# Patient Record
Sex: MALE | Race: WHITE
[De-identification: names, ages, dates, MRNs, and addresses within clinical notes are randomized per-mention and may not be internally consistent; named-entity substitution may affect disease eponyms.]

---

## 2018-01-16 NOTE — EMERGENCY ROOM REPORT
History of Present Illness


General


Chief Complaint:  Dyspnea/Respdistress


Source:  Patient, EMS





Present Illness


HPI


Patient presents with edema and dyspnea.  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 

on oral diuretics.  The edema/swelling is extending up into his abdomen.  His 

oxygen saturation was in the 70s.  This improved with oxygen and also albuterol.





He states he has been taking his meds.





He denies calf pain, chest pain, hemoptysis, fevers, productive cough.





In the past he was seen here for alcohol abuse and a fall.  Denies alcohol at 

this time or other drugs.





No dysuria, fevers, chills, NVD.  No SI or HI.





On oral DM meds (glyburide).


Allergies:  


Coded Allergies:  


     No Known Allergies (Unverified , 1/16/18)





Patient History


Past Medical History:  see triage record, old chart reviewed


Social History:  Reports: smoking, alcohol use - prior - denies now, Denies: 

drug use


Social History Narrative


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


Reviewed Nursing Documentation:  PMH: Agreed, PSxH: Agreed





Nursing Documentation-PMH


Hx Hypertension:  Yes


Hx Asthma:  Yes


Hx Diabetes:  Yes





Review of Systems


All Other Systems:  negative except mentioned in HPI





Physical Exam





Vital Signs








  Date Time  Temp Pulse Resp B/P (MAP) Pulse Ox O2 Delivery O2 Flow Rate FiO2


 


1/16/18 20:11 98.4 90 18 118/73 98   








Sp02 EP Interpretation:  reviewed, normal - though this was on O2


General Appearance:  no apparent distress, alert, obese, Chronically Ill


Eyes:  bilateral eye normal inspection, bilateral eye PERRL


ENT:  moist mucus membranes


Neck:  full range of motion, supple


Respiratory:  decreased breath sounds


Cardiovascular #1:  regular rate, rhythm, edema - 3+ pitting


Cardiovascular #2:  2+ radial (L)


Gastrointestinal:  normal bowel sounds, non tender, hepatomegaly, overweight


Genitourinary:  other - massive scotal edema


Musculoskeletal:  digits/nails normal, no calf tenderness


Neurologic:  alert, motor strength/tone normal, DTRs symmetric, sensory intact, 

other - slurred speech


Psychiatric:  other - slight inappropriate affect


Skin:  other - minimal erythema abdomen and legs





Medical Decision Making


Diagnostic Impression:  


 Primary Impression:  


 CHF (congestive heart failure)


 Qualified Codes:  I50.43 - Acute on chronic combined systolic (congestive) and 

diastolic (congestive) heart failure


 Additional Impressions:  


 Cor pulmonale


 Hypoxia


 Anasarca


 Hepatic congestion


 Elevated ammonia


ER Course


The patient presents with significant edema extending from his abdomen down to 

his legs.  Also is hypoxemic.  Differential includes exacerbation of congestive 

heart failure, acute myocardial infarction, noncompliance, pulmonary embolus 

amongst others.  Evaluation will be with EKG and chest x-ray and labs.  

Treatment with nitro paste and Lasix. 





EKG without injury.  CXR = chf and inc cor.  Labs with normal WBC, troponin.  

Elevated LFTs with elevated ammonia.  INR is prolonged slightly (this would be 

against PE as no calf tenderness).





Patient diuresed but still with relative hypoxia and anasarca.  Needs admission 

for continued diuresis and evaluation.  





Discussed with Dr. Oneill who requests patient remain here.





Admit telemetry, Dr. Plummer.





Laboratory Tests








Test


  1/16/18


22:10 1/17/18


02:20


 


White Blood Count


  8.5 K/UL


(4.8-10.8) 


 


 


Red Blood Count


  5.62 M/UL


(4.70-6.10) 


 


 


Hemoglobin


  14.6 G/DL


(14.2-18.0) 


 


 


Hematocrit


  51.3 %


(42.0-52.0) 


 


 


Mean Corpuscular Volume 91 FL (80-99)   


 


Mean Corpuscular Hemoglobin


  26.0 PG


(27.0-31.0)  L 


 


 


Mean Corpuscular Hemoglobin


Concent 28.5 G/DL


(32.0-36.0)  L 


 


 


Red Cell Distribution Width


  15.5 %


(11.6-14.8)  H 


 


 


Platelet Count


  171 K/UL


(150-450) 


 


 


Mean Platelet Volume


  6.8 FL


(6.5-10.1) 


 


 


Neutrophils (%) (Auto)


  72.7 %


(45.0-75.0) 


 


 


Lymphocytes (%) (Auto)


  9.7 %


(20.0-45.0)  L 


 


 


Monocytes (%) (Auto)


  14.4 %


(1.0-10.0)  H 


 


 


Eosinophils (%) (Auto)


  0.1 %


(0.0-3.0) 


 


 


Basophils (%) (Auto)


  3.1 %


(0.0-2.0)  H 


 


 


Prothrombin Time


  14.1 SEC


(9.30-11.50)  H 


 


 


Prothrombin Time INR


  1.3 (0.9-1.1)


H 


 


 


PTT


  29 SEC (23-33)


  


 


 


Urine Color Elisabet   


 


Urine Appearance Clear   


 


Urine pH 5 (4.5-8.0)   


 


Urine Specific Gravity


  1.015


(1.005-1.035) 


 


 


Urine Protein


  1+ (NEGATIVE)


H 


 


 


Urine Glucose (UA)


  Negative


(NEGATIVE) 


 


 


Urine Ketones


  Negative


(NEGATIVE) 


 


 


Urine Occult Blood


  Negative


(NEGATIVE) 


 


 


Urine Nitrite


  Negative


(NEGATIVE) 


 


 


Urine Bilirubin


  Negative


(NEGATIVE) 


 


 


Urine Ictotest Negative   


 


Urine Urobilinogen


  4 MG/DL


(0.0-1.0)  H 


 


 


Urine Leukocyte Esterase


  1+ (NEGATIVE)


H 


 


 


Urine RBC


  0-2 /HPF (0 -


0)  H 


 


 


Urine WBC


  0-2 /HPF (0 -


0) 


 


 


Urine Squamous Epithelial


Cells Occasional


/LPF 


 


 


Urine Bacteria


  Few /HPF


(NONE) 


 


 


Sodium Level


  131 MMOL/L


(136-145)  L 


 


 


Potassium Level


  4.7 MMOL/L


(3.5-5.1) 


 


 


Chloride Level


  91 MMOL/L


()  L 


 


 


Carbon Dioxide Level


  42 MMOL/L


(21-32)  *H 


 


 


Blood Urea Nitrogen


  38 mg/dL


(7-18)  H 


 


 


Creatinine


  1.2 MG/DL


(0.55-1.30) 


 


 


Estimate Glomerular


Filtration Rate > 60 mL/min


(>60) 


 


 


Glucose Level


  70 MG/DL


()  L 


 


 


Calcium Level


  8.7 MG/DL


(8.5-10.1) 


 


 


Total Bilirubin


  1.2 MG/DL


(0.2-1.0)  H 


 


 


Direct Bilirubin


  0.7 MG/DL


(0.0-0.3)  H 


 


 


Aspartate Amino Transferase


(AST) 131 U/L


(15-37)  H 


 


 


Alanine Aminotransferase (ALT)


  183 U/L


(12-78)  H 


 


 


Alkaline Phosphatase


  91 U/L


() 


 


 


Ammonia


  71 umol/L


(11-32)  H 


 


 


Total Creatine Kinase


  291 U/L


() 


 


 


Troponin I


  0.026 ng/mL


(0.000-0.056) 0.014 ng/mL


(0.000-0.056)


 


Pro-B-Type Natriuretic Peptide


  3712 pg/mL


(0-125)  H 


 


 


Total Protein


  5.9 G/DL


(6.4-8.2)  L 


 


 


Albumin


  2.8 G/DL


(3.4-5.0)  L 


 


 


Globulin 3.1 g/dL   


 


Albumin/Globulin Ratio


  0.9 (1.0-2.7)


L 


 


 


Urine Opiates Screen


  Negative


(NEGATIVE) 


 


 


Urine Barbiturates Screen


  Negative


(NEGATIVE) 


 


 


Phencyclidine (PCP) Screen


  Negative


(NEGATIVE) 


 


 


Urine Amphetamines Screen


  Negative


(NEGATIVE) 


 


 


Urine Benzodiazepines Screen


  Negative


(NEGATIVE) 


 


 


Urine Cocaine Screen


  Negative


(NEGATIVE) 


 


 


Urine Marijuana (THC) Screen


  Negative


(NEGATIVE) 


 








EKG Diagnostic Results


Rate:  normal


Rhythm:  NSR


ST Segments:  no acute changes





Rhythm Strip Diag. Results


EP Interpretation:  yes


Rhythm:  NSR, no PVC's, no ectopy





Chest X-Ray Diagnostic Results


Chest X-Ray Diagnostic Results :  


   Chest X-Ray Ordered:  Yes


   # of Views/Limited/Complete:  1 View


   Indication:  Shortness of Breath


   EP Interpretation:  Yes


   Interpretation:  no effusion, no pneumothorax, other - cardiomegally


   Impression:  Other


   Electronically Signed by:  Franklin Puentes MD





Last Vital Signs








  Date Time  Temp Pulse Resp B/P (MAP) Pulse Ox O2 Delivery O2 Flow Rate FiO2


 


1/17/18 04:00 97.7 88 20 102/73 93   


 


1/17/18 02:35      Non-Rebreather 15.0 21








Status:  improved


Disposition:  ADMITTED AS INPATIENT


Condition:  Serious











Franklin Puentes M.D. Jan 16, 2018 21:03

## 2018-01-17 NOTE — CARDIOLOGY REPORT
--------------- APPROVED REPORT --------------





EKG Measurement

Heart [id number]

WA 176P

[id number]

[id number]

[id number]





Normal sinus rhythm

Rightward axis

Septal infarct, age undetermined

Abnormal ECG

## 2018-01-17 NOTE — CARDIOLOGY REPORT
--------------- APPROVED REPORT --------------





EXAM: Two-dimensional and M-mode echocardiogram with Doppler and color 

Doppler.



INDICATION

LV FUNCTION 



M-Mode DIMENSIONS 

IVSd1.1 (0.7-1.1cm)Left Atrium (MM)3.6 (1.6-4.0cm)

LVDd4.2 (3.5-5.6cm)Aortic Root3.9 (2.0-3.7cm)

PWd1.7 (0.7-1.1cm)Aortic Cusp Exc.2.1 (1.5-2.0cm)

IVSs2.2 cm

LVDs2.7 (2.5-4.0cm)

PWs2.7 cm





Technically difficult study due to poor acoustical windows and patient breathing.

Normal left ventricular chamber size.

Mild LV hypokineiss with abnormal septal motion.

Left ventricular ejection fraction estimated to be 50%.

Small pericardial effusion

Severe right atrial and right ventricular enlargment, consistant with volume pressure 

overloaded .

Left atrial chamber size are within normal limits .

Focal aortic valve sclerosis with adequate cusp excursion.

Thickened mitral valve leaflets with normal excursion.

Mitral annulus and aortic root calcification.

Normal pulmonic valve structure.

Normal tricuspid valve structure. 

IVC dilated at 4.0 cm without physiologic collapse suggestive of increased RA 

pressure.

Abnormal septal motion due to right ventricle volume overload.



A  color flow and spectral Doppler study was performed and revealed:

No  aortic regurgitation.

Mild mitral regurgitation.

Mitral diastolic velocities suggest reduced left ventricular relaxation c/w mild LV 

diastolic dysfunction (Grade I ).

Mild tricuspid regurgitation.

Tricuspid  systolic velocities suggests peak right ventricular systolic pressure of 75  

mmHg,consistent with severe pulmonary hypertension.

No Pulmonic regurgitation present.

## 2018-01-17 NOTE — CARDIOLOGY PROGRESS NOTE
Assessment/Plan


Assessment/Plan


core pulmonale 


passive leiver congestion 


bronchospasm 








hhn 


durietic as bp allso 


revatio trial?





Objective





Last 24 Hour Vital Signs








  Date Time  Temp Pulse Resp B/P (MAP) Pulse Ox O2 Delivery O2 Flow Rate FiO2


 


1/17/18 16:00 97.5 82 20 116/76 94   


 


1/17/18 11:36     92   


 


1/17/18 11:31 97.9 93 19 116/89    


 


1/17/18 08:35 98.1 99 19 122/75 95   


 


1/17/18 04:00 97.7 88 20 102/73 93   


 


1/17/18 02:35 98.4 81 18 119/89 99 Non-Rebreather 15.0 21


 


1/17/18 02:00  81 18 119/89 99 Non-Rebreather 15.0 


 


1/17/18 00:00  95 21 117/66 100 Non-Rebreather 15.0 


 


1/16/18 22:46    118/72    


 


1/16/18 22:00  84 16 109/81 100 Non-Rebreather 15.0 


 


1/16/18 21:40  87 18  97 Room Air  21


 


1/16/18 21:30  98 18  97 Room Air  21


 


1/16/18 21:30        36


 


1/16/18 21:30  102 20   Room Air  21


 


1/16/18 20:20 98.4  18 118/73 98   


 


1/16/18 20:20  90 18   Non-Rebreather 15.0 


 


1/16/18 20:11 98.4 90 18 118/73 98   

















Intake and Output  


 


 1/16/18 1/17/18





 19:00 07:00


 


Output Total  800 ml


 


Balance  -800 ml


 


  


 


Output Urine Total  800 ml


 


# Voids  4











Laboratory Tests








Test


  1/16/18


22:10 1/17/18


02:20 1/17/18


12:45


 


White Blood Count


  8.5 K/UL


(4.8-10.8) 


  


 


 


Red Blood Count


  5.62 M/UL


(4.70-6.10) 


  


 


 


Hemoglobin


  14.6 G/DL


(14.2-18.0) 


  


 


 


Hematocrit


  51.3 %


(42.0-52.0) 


  


 


 


Mean Corpuscular Volume 91 FL (80-99)    


 


Mean Corpuscular Hemoglobin


  26.0 PG


(27.0-31.0)  L 


  


 


 


Mean Corpuscular Hemoglobin


Concent 28.5 G/DL


(32.0-36.0)  L 


  


 


 


Red Cell Distribution Width


  15.5 %


(11.6-14.8)  H 


  


 


 


Platelet Count


  171 K/UL


(150-450) 


  


 


 


Mean Platelet Volume


  6.8 FL


(6.5-10.1) 


  


 


 


Neutrophils (%) (Auto)


  72.7 %


(45.0-75.0) 


  


 


 


Lymphocytes (%) (Auto)


  9.7 %


(20.0-45.0)  L 


  


 


 


Monocytes (%) (Auto)


  14.4 %


(1.0-10.0)  H 


  


 


 


Eosinophils (%) (Auto)


  0.1 %


(0.0-3.0) 


  


 


 


Basophils (%) (Auto)


  3.1 %


(0.0-2.0)  H 


  


 


 


Prothrombin Time


  14.1 SEC


(9.30-11.50)  H 


  


 


 


Prothromb Time International


Ratio 1.3 (0.9-1.1)


H 


  


 


 


Activated Partial


Thromboplast Time 29 SEC (23-33)


  


  


 


 


Urine Color Elisabet    


 


Urine Appearance Clear    


 


Urine pH 5 (4.5-8.0)    


 


Urine Specific Gravity


  1.015


(1.005-1.035) 


  


 


 


Urine Protein


  1+ (NEGATIVE)


H 


  


 


 


Urine Glucose (UA)


  Negative


(NEGATIVE) 


  


 


 


Urine Ketones


  Negative


(NEGATIVE) 


  


 


 


Urine Occult Blood


  Negative


(NEGATIVE) 


  


 


 


Urine Nitrite


  Negative


(NEGATIVE) 


  


 


 


Urine Bilirubin


  Negative


(NEGATIVE) 


  


 


 


Urine Ictotest Negative    


 


Urine Urobilinogen


  4 MG/DL


(0.0-1.0)  H 


  


 


 


Urine Leukocyte Esterase


  1+ (NEGATIVE)


H 


  


 


 


Urine RBC


  0-2 /HPF (0 -


0)  H 


  


 


 


Urine WBC


  0-2 /HPF (0 -


0) 


  


 


 


Urine Squamous Epithelial


Cells Occasional


/LPF 


  


 


 


Urine Bacteria


  Few /HPF


(NONE) 


  


 


 


Sodium Level


  131 MMOL/L


(136-145)  L 


  


 


 


Potassium Level


  4.7 MMOL/L


(3.5-5.1) 


  


 


 


Chloride Level


  91 MMOL/L


()  L 


  


 


 


Carbon Dioxide Level


  42 MMOL/L


(21-32)  *H 


  


 


 


Blood Urea Nitrogen


  38 mg/dL


(7-18)  H 


  


 


 


Creatinine


  1.2 MG/DL


(0.55-1.30) 


  


 


 


Estimat Glomerular Filtration


Rate > 60 mL/min


(>60) 


  


 


 


Glucose Level


  70 MG/DL


()  L 


  


 


 


Calcium Level


  8.7 MG/DL


(8.5-10.1) 


  


 


 


Total Bilirubin


  1.2 MG/DL


(0.2-1.0)  H 


  


 


 


Direct Bilirubin


  0.7 MG/DL


(0.0-0.3)  H 


  


 


 


Aspartate Amino Transf


(AST/SGOT) 131 U/L


(15-37)  H 


  


 


 


Alanine Aminotransferase


(ALT/SGPT) 183 U/L


(12-78)  H 


  


 


 


Alkaline Phosphatase


  91 U/L


() 


  


 


 


Ammonia


  71 umol/L


(11-32)  H 


  


 


 


Total Creatine Kinase


  291 U/L


() 


  


 


 


Troponin I


  0.026 ng/mL


(0.000-0.056) 0.014 ng/mL


(0.000-0.056) 


 


 


Pro-B-Type Natriuretic Peptide


  3712 pg/mL


(0-125)  H 


  


 


 


Total Protein


  5.9 G/DL


(6.4-8.2)  L 


  


 


 


Albumin


  2.8 G/DL


(3.4-5.0)  L 


  


 


 


Globulin 3.1 g/dL    


 


Albumin/Globulin Ratio


  0.9 (1.0-2.7)


L 


  


 


 


Urine Opiates Screen


  Negative


(NEGATIVE) 


  


 


 


Urine Barbiturates Screen


  Negative


(NEGATIVE) 


  


 


 


Phencyclidine (PCP) Screen


  Negative


(NEGATIVE) 


  


 


 


Urine Amphetamines Screen


  Negative


(NEGATIVE) 


  


 


 


Urine Benzodiazepines Screen


  Negative


(NEGATIVE) 


  


 


 


Urine Cocaine Screen


  Negative


(NEGATIVE) 


  


 


 


Urine Marijuana (THC) Screen


  Negative


(NEGATIVE) 


  


 


 


Arterial Blood pH


  


  


  7.350


(7.350-7.450)


 


Arterial Blood Partial


Pressure CO2 


  


  79.5 mmHg


(35.0-45.0)  *H


 


Arterial Blood Partial


Pressure O2 


  


  52.9 mmHg


(75.0-100.0)  L


 


Arterial Blood HCO3


  


  


  43.0 mmol/L


(22.0-26.0)  H


 


Arterial Blood Oxygen


Saturation 


  


  88.0 %


(92.0-98.0)  L


 


Arterial Blood Base Excess   13.0  


 


Frandy Test   Positive  

















JEMAL GAMBOA Jan 17, 2018 20:05

## 2018-01-17 NOTE — DIAGNOSTIC IMAGING REPORT
Indication: Chest pain

 

Comparison:  None

 

A single view chest radiograph was obtained.

 

Findings:

 

Interstitial opacities and vascular prominence with cardiomegaly demonstrated. Bones

are unremarkable.

 

IMPRESSION:

 

Interstitial edema/CHF suspected

## 2018-01-17 NOTE — DIAGNOSTIC IMAGING REPORT
Indication: Dyspnea

 

Comparison:  1/16/1980

 

A single view chest radiograph was obtained.

 

Findings:

 

Interstitial opacities and prominent vascularity demonstrated with cardiomegaly.

Findings appear stable. The right lung base is obscured. There could be a small

pleural effusion.

 

IMPRESSION:

 

CHF/interstitial edema

## 2018-01-17 NOTE — HISTORY AND PHYSICAL
History of Present Illness


General


Date patient seen:  Jan 17, 2018


Reason for Hospitalization:  Dyspnea/Respdistress





Present Illness


HPI


47 year old male with hx of CHF, morbid obesity, psych disorder presented with 

worsening pedal edema and dyspnea.  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 

oral diuretics.  The edema/swelling is extending up into his abdomen.  His 

oxygen saturation was in the 70s.  This improved with oxygen and also albuterol.





Allergies:  


Coded Allergies:  


     No Known Allergies (Unverified , 1/16/18)





Medication History


Scheduled


Glyburide (Glyburide), 5 MG PO BID, (Reported)


Levalbuterol Tartrate (Levalbuterol Tartrate Hfa), 45 MCG IH QID, (Reported)


Naproxen Sodium (Naproxen Sodium), 375 MG ORAL TWICE A DAY, (Reported)





Scheduled PRN


Tramadol Hcl* (Ultram*), 50 MG ORAL Q6H PRN for For Pain, (Reported)





Miscellaneous Medications


Blood Sugar Diagnostic (Freestyle Lite Test Strips), 1 EACH , (Reported)





Patient History


Healthcare decision maker





Resuscitation status


Full Code


Advanced Directive on File








Past Medical/Surgical History


Past Medical/Surgical History:  


(1) Diabetes mellitus


(2) Hepatic congestion


(3) Cor pulmonale





Review of Systems


Respiratory:  Reports: shortness of breath, [place]


Musculoskeletal:  Reports: muscle stiffness





Physical Exam


General Appearance:  WD/WN


Lines, tubes and drains:  peripheral


HEENT:  normocephalic, atraumatic


Neck:  non-tender, normal alignment


Respiratory/Chest:  chest wall non-tender, lungs clear


Cardiovascular/Chest:  normal peripheral pulses, normal rate


Abdomen:  normal bowel sounds, non tender


Genitourinary/Rectal:  normal genital exam, normal rectal exam


Extremities:  normal range of motion, non-tender


Skin Exam:  normal pigmentation





Last 24 Hour Vital Signs








  Date Time  Temp Pulse Resp B/P (MAP) Pulse Ox O2 Delivery O2 Flow Rate FiO2


 


1/17/18 16:00 97.5 82 20 116/76 94   


 


1/17/18 11:36     92   


 


1/17/18 11:31 97.9 93 19 116/89    


 


1/17/18 08:35 98.1 99 19 122/75 95   


 


1/17/18 04:00 97.7 88 20 102/73 93   


 


1/17/18 02:35 98.4 81 18 119/89 99 Non-Rebreather 15.0 21


 


1/17/18 02:00  81 18 119/89 99 Non-Rebreather 15.0 


 


1/17/18 00:00  95 21 117/66 100 Non-Rebreather 15.0 


 


1/16/18 22:46    118/72    


 


1/16/18 22:00  84 16 109/81 100 Non-Rebreather 15.0 


 


1/16/18 21:40  87 18  97 Room Air  21


 


1/16/18 21:30  98 18  97 Room Air  21


 


1/16/18 21:30        36


 


1/16/18 21:30  102 20   Room Air  21


 


1/16/18 20:20 98.4  18 118/73 98   


 


1/16/18 20:20  90 18   Non-Rebreather 15.0 


 


1/16/18 20:11 98.4 90 18 118/73 98   

















Intake and Output  


 


 1/16/18 1/17/18





 19:00 07:00


 


Output Total  800 ml


 


Balance  -800 ml


 


  


 


Output Urine Total  800 ml


 


# Voids  4











Laboratory Tests








Test


  1/16/18


22:10 1/17/18


02:20 1/17/18


12:45


 


White Blood Count


  8.5 K/UL


(4.8-10.8) 


  


 


 


Red Blood Count


  5.62 M/UL


(4.70-6.10) 


  


 


 


Hemoglobin


  14.6 G/DL


(14.2-18.0) 


  


 


 


Hematocrit


  51.3 %


(42.0-52.0) 


  


 


 


Mean Corpuscular Volume 91 FL (80-99)    


 


Mean Corpuscular Hemoglobin


  26.0 PG


(27.0-31.0)  L 


  


 


 


Mean Corpuscular Hemoglobin


Concent 28.5 G/DL


(32.0-36.0)  L 


  


 


 


Red Cell Distribution Width


  15.5 %


(11.6-14.8)  H 


  


 


 


Platelet Count


  171 K/UL


(150-450) 


  


 


 


Mean Platelet Volume


  6.8 FL


(6.5-10.1) 


  


 


 


Neutrophils (%) (Auto)


  72.7 %


(45.0-75.0) 


  


 


 


Lymphocytes (%) (Auto)


  9.7 %


(20.0-45.0)  L 


  


 


 


Monocytes (%) (Auto)


  14.4 %


(1.0-10.0)  H 


  


 


 


Eosinophils (%) (Auto)


  0.1 %


(0.0-3.0) 


  


 


 


Basophils (%) (Auto)


  3.1 %


(0.0-2.0)  H 


  


 


 


Prothrombin Time


  14.1 SEC


(9.30-11.50)  H 


  


 


 


Prothromb Time International


Ratio 1.3 (0.9-1.1)


H 


  


 


 


Activated Partial


Thromboplast Time 29 SEC (23-33)


  


  


 


 


Urine Color Elisabet    


 


Urine Appearance Clear    


 


Urine pH 5 (4.5-8.0)    


 


Urine Specific Gravity


  1.015


(1.005-1.035) 


  


 


 


Urine Protein


  1+ (NEGATIVE)


H 


  


 


 


Urine Glucose (UA)


  Negative


(NEGATIVE) 


  


 


 


Urine Ketones


  Negative


(NEGATIVE) 


  


 


 


Urine Occult Blood


  Negative


(NEGATIVE) 


  


 


 


Urine Nitrite


  Negative


(NEGATIVE) 


  


 


 


Urine Bilirubin


  Negative


(NEGATIVE) 


  


 


 


Urine Ictotest Negative    


 


Urine Urobilinogen


  4 MG/DL


(0.0-1.0)  H 


  


 


 


Urine Leukocyte Esterase


  1+ (NEGATIVE)


H 


  


 


 


Urine RBC


  0-2 /HPF (0 -


0)  H 


  


 


 


Urine WBC


  0-2 /HPF (0 -


0) 


  


 


 


Urine Squamous Epithelial


Cells Occasional


/LPF 


  


 


 


Urine Bacteria


  Few /HPF


(NONE) 


  


 


 


Sodium Level


  131 MMOL/L


(136-145)  L 


  


 


 


Potassium Level


  4.7 MMOL/L


(3.5-5.1) 


  


 


 


Chloride Level


  91 MMOL/L


()  L 


  


 


 


Carbon Dioxide Level


  42 MMOL/L


(21-32)  *H 


  


 


 


Blood Urea Nitrogen


  38 mg/dL


(7-18)  H 


  


 


 


Creatinine


  1.2 MG/DL


(0.55-1.30) 


  


 


 


Estimat Glomerular Filtration


Rate > 60 mL/min


(>60) 


  


 


 


Glucose Level


  70 MG/DL


()  L 


  


 


 


Calcium Level


  8.7 MG/DL


(8.5-10.1) 


  


 


 


Total Bilirubin


  1.2 MG/DL


(0.2-1.0)  H 


  


 


 


Direct Bilirubin


  0.7 MG/DL


(0.0-0.3)  H 


  


 


 


Aspartate Amino Transf


(AST/SGOT) 131 U/L


(15-37)  H 


  


 


 


Alanine Aminotransferase


(ALT/SGPT) 183 U/L


(12-78)  H 


  


 


 


Alkaline Phosphatase


  91 U/L


() 


  


 


 


Ammonia


  71 umol/L


(11-32)  H 


  


 


 


Total Creatine Kinase


  291 U/L


() 


  


 


 


Troponin I


  0.026 ng/mL


(0.000-0.056) 0.014 ng/mL


(0.000-0.056) 


 


 


Pro-B-Type Natriuretic Peptide


  3712 pg/mL


(0-125)  H 


  


 


 


Total Protein


  5.9 G/DL


(6.4-8.2)  L 


  


 


 


Albumin


  2.8 G/DL


(3.4-5.0)  L 


  


 


 


Globulin 3.1 g/dL    


 


Albumin/Globulin Ratio


  0.9 (1.0-2.7)


L 


  


 


 


Urine Opiates Screen


  Negative


(NEGATIVE) 


  


 


 


Urine Barbiturates Screen


  Negative


(NEGATIVE) 


  


 


 


Phencyclidine (PCP) Screen


  Negative


(NEGATIVE) 


  


 


 


Urine Amphetamines Screen


  Negative


(NEGATIVE) 


  


 


 


Urine Benzodiazepines Screen


  Negative


(NEGATIVE) 


  


 


 


Urine Cocaine Screen


  Negative


(NEGATIVE) 


  


 


 


Urine Marijuana (THC) Screen


  Negative


(NEGATIVE) 


  


 


 


Arterial Blood pH


  


  


  7.350


(7.350-7.450)


 


Arterial Blood Partial


Pressure CO2 


  


  79.5 mmHg


(35.0-45.0)  *H


 


Arterial Blood Partial


Pressure O2 


  


  52.9 mmHg


(75.0-100.0)  L


 


Arterial Blood HCO3


  


  


  43.0 mmol/L


(22.0-26.0)  H


 


Arterial Blood Oxygen


Saturation 


  


  88.0 %


(92.0-98.0)  L


 


Arterial Blood Base Excess   13.0  


 


Frandy Test   Positive  








Height (Feet):  6


Height (Inches):  2.00


Weight (Pounds):  283


Medications





Current Medications








 Medications


  (Trade)  Dose


 Ordered  Sig/German


 Route


 PRN Reason  Start Time


 Stop Time Status Last Admin


Dose Admin


 


 Acetaminophen


  (Tylenol)  650 mg  Q4H  PRN


 ORAL


 Fever  1/17/18 00:15


 2/16/18 00:14   


 


 


 Albuterol/


 Ipratropium


  (Albuterol/


 Ipratropium)  3 ml  EVERY 4 HOURS  PRN


 HHN


 Shortness of Breath  1/17/18 00:15


 1/22/18 00:14   


 


 


 Dextrose


  (Dextrose 50%)    STAT  PRN


 IV


 Hypoglycemia  1/17/18 00:15


 2/16/18 00:14   


 


 


 Furosemide


  (Lasix)  40 mg  EVERY 8  HOURS


 IV


   1/17/18 06:00


 2/16/18 05:59  1/17/18 14:54


 


 


 Heparin Sodium


  (Porcine)


  (Heparin 5000


 units/ml)  5,000 units  EVERY 12  HOURS


 SUBQ


   1/17/18 09:00


 2/16/18 08:59  1/17/18 08:19


 


 


 Insulin Aspart


  (NovoLOG)    BEFORE MEALS AND  HS


 SUBQ


   1/17/18 06:30


 2/16/18 06:29   


 


 


 Ondansetron HCl


  (Zofran)  4 mg  Q6H  PRN


 IVP


 Nausea & Vomiting  1/17/18 00:15


 2/16/18 00:14   


 


 


 Polyethylene


 Glycol


  (Miralax)  17 gm  DAILYPRN  PRN


 ORAL


 Constipation  1/17/18 00:15


 2/16/18 00:14   


 


 


 Quetiapine


 Fumarate


  (SEROquel)  25 mg  Q4H  PRN


 ORAL


 agitation  1/17/18 12:45


 2/16/18 12:44   


 


 


 Temazepam


  (Restoril)  15 mg  HSPRN  PRN


 ORAL


 Insomnia  1/17/18 00:15


 1/24/18 00:14   


 











Assessment/Plan


Problem List:  


(1) Pulmonary edema


ICD Codes:  J81.1 - Chronic pulmonary edema


SNOMED:  66779315


(2) Anasarca


ICD Codes:  R60.1 - Generalized edema


SNOMED:  883928638, 809348620


(3) CHF (congestive heart failure)


ICD Codes:  I50.9 - Heart failure, unspecified


SNOMED:  64459701, 330111662


Qualifiers:  


   Qualified Codes:  I50.43 - Acute on chronic combined systolic (congestive) 

and diastolic (congestive) heart failure


(4) Hypoxia


ICD Codes:  R09.02 - Hypoxemia


SNOMED:  645781677, 841098616


(5) Cor pulmonale


ICD Codes:  I27.81 - Cor pulmonale (chronic)


SNOMED:  91221102, 282268995


(6) Diabetes mellitus


ICD Codes:  E11.9 - Type 2 diabetes mellitus without complications


SNOMED:  94629849


Assessment/Plan


diuretics


check echo


cxr in a few days


cardio evaluation


check electrolytes











BALJIT JORDAN Jan 17, 2018 17:06

## 2018-01-18 NOTE — PULMONOLOGY PROGRESS NOTE
Assessment/Plan


Problems:  


(1) Pulmonary edema


(2) Anasarca


(3) CHF (congestive heart failure)


(4) Hypoxia


(5) Cor pulmonale


(6) Diabetes mellitus


Assessment/Plan


on lasix 40 q8


check electrolytes


?etiology of pulmonary hypertension


echo reviewed


cardiology evaluation


sliding scale





Subjective


Interval Events:  awake, comfortable


Allergies:  


Coded Allergies:  


     No Known Allergies (Unverified , 1/16/18)





Objective





Last 24 Hour Vital Signs








  Date Time  Temp Pulse Resp B/P (MAP) Pulse Ox O2 Delivery O2 Flow Rate FiO2


 


1/18/18 09:03     93   


 


1/18/18 08:00 99.0 89 20 129/78 93 Bi-pap  


 


1/18/18 08:00  85      


 


1/18/18 06:55  95 18   Room Air  21


 


1/18/18 06:55  95 18  92   


 


1/18/18 04:00  87      


 


1/18/18 04:00 97.2 81 19 123/79 90 Bi-pap  


 


1/18/18 03:06  71 16  97 Facial  30


 


1/18/18 00:00 97.7 90 19 131/93 90 Bi-pap  


 


1/17/18 23:30  82 20  95 Facial  30


 


1/17/18 20:00 97.5 85 16 119/70 86   


 


1/17/18 20:00  87      


 


1/17/18 19:30  80 18  96 Facial  30


 


1/17/18 16:00 97.5 82 20 116/76 94   

















Intake and Output  


 


 1/17/18 1/18/18





 19:00 07:00


 


Intake Total 640 ml 


 


Output Total 250 ml 950 ml


 


Balance 390 ml -950 ml


 


  


 


Intake Oral 640 ml 


 


Output Urine Total 250 ml 950 ml


 


# Voids 3 








General Appearance:  WD/WN


HEENT:  normocephalic, atraumatic


Respiratory/Chest:  chest wall non-tender, normal breath sounds


Cardiovascular:  normal peripheral pulses, normal rate


Abdomen:  normal bowel sounds, soft, non tender, no scars


Extremities:  no cyanosis


Skin:  no ulcers


Neurologic/Psychiatric:  CNs II-XII grossly normal, abnormal gait


Lymphatic:  no groin adenopathy


Laboratory Tests


1/17/18 12:45: 


Arterial Blood pH 7.350, Arterial Blood Partial Pressure CO2 79.5*H, Arterial 

Blood Partial Pressure O2 52.9L, Arterial Blood HCO3 43.0H, Arterial Blood 

Oxygen Saturation 88.0L, Arterial Blood Base Excess 13.0, Frandy Test Positive


1/18/18 06:35: 


Sodium Level 134L, Potassium Level 3.3L, Chloride Level 88L, Carbon Dioxide 

Level 45*H, Anion Gap 1L, Blood Urea Nitrogen 25H, Creatinine 0.9, Estimat 

Glomerular Filtration Rate > 60, Glucose Level 54L, Calcium Level 8.1L, 

Troponin I 0.028





Current Medications








 Medications


  (Trade)  Dose


 Ordered  Sig/German


 Route


 PRN Reason  Start Time


 Stop Time Status Last Admin


Dose Admin


 


 Acetaminophen


  (Tylenol)  650 mg  Q4H  PRN


 ORAL


 T>100.5  1/17/18 20:15


 2/16/18 00:14   


 


 


 Albuterol/


 Ipratropium


  (Albuterol/


 Ipratropium)  3 ml  Q4H  PRN


 HHN


 Shortness of Breath  1/17/18 18:00


 1/22/18 17:59   


 


 


 Dextrose


  (Dextrose 50%)    STAT  PRN


 IV


 Hypoglycemia  1/17/18 18:00


 2/16/18 17:59   


 


 


 Furosemide


  (Lasix)  40 mg  EVERY 8  HOURS


 IV


   1/17/18 22:00


 2/16/18 05:59  1/18/18 06:07


 


 


 Heparin Sodium


  (Porcine)


  (Heparin 5000


 units/ml)  5,000 units  EVERY 12  HOURS


 SUBQ


   1/17/18 21:00


 2/16/18 08:59  1/18/18 09:05


 


 


 Insulin Aspart


  (NovoLOG)    BEFORE MEALS AND  HS


 SUBQ


   1/17/18 21:00


 2/16/18 06:29   


 


 


 Ondansetron HCl


  (Zofran)  4 mg  Q6H  PRN


 IVP


 Nausea & Vomiting  1/17/18 18:15


 2/16/18 00:14   


 


 


 Polyethylene


 Glycol


  (Miralax)  17 gm  DAILYPRN  PRN


 ORAL


 Constipation  1/17/18 18:00


 2/16/18 17:59   


 


 


 Quetiapine


 Fumarate


  (SEROquel)  25 mg  Q4H  PRN


 ORAL


 agitation  1/17/18 18:00


 2/16/18 17:59   


 


 


 Temazepam


  (Restoril)  15 mg  HSPRN  PRN


 ORAL


 Insomnia  1/17/18 21:00


 1/24/18 20:59   


 

















BALJIT JORDAN Jan 18, 2018 12:00

## 2018-01-18 NOTE — DIAGNOSTIC IMAGING REPORT
Indication: Dyspnea

 

Comparison:  1/17/2018

 

A single view chest radiograph was obtained.

 

Findings:

 

Vascular prominence and interstitial edema again demonstrated. Hazy right basilar

opacity likely a small pleural effusion noted. Heart remains enlarged but stable.

 

IMPRESSION:

 

No significant change. CHF/interstitial edema. Suspected right pleural effusion

## 2018-01-18 NOTE — CONSULTATION
DATE OF CONSULTATION:  01/17/2018



CARDIOLOGY CONSULTATION



CONSULTING PHYSICIAN:  Donavon Cosby M.D.



ATTENDING/REFERRING PHYSICIAN:  Harrison Plummer M.D.



REASON FOR REFERRAL:  Heart failure.



HISTORY OF PRESENT ILLNESS:  This is a very unfortunate young gentleman,

who presented to the hospital because of shortness of breath, somewhat of

a poor historian, therefore information is obtained from review of the

patient's chart as well as from discussion with the patient.  He tells me

he has been short of breath for sometime.  He has had leg swelling for

approximately two months and has finally presented to the emergency room

at [facility].  His complaints on arrival was shortness of

breath and edema and apparently he is in a board and care facility and is

on oral diuretics.  His swelling has been increasingly worse.  He has got

oxygen saturation to 70% improved as well as albuterol therapy.  He really

denies any chest pains.  He does use two pillows for comfort.  He has

dyspnea on exertion if he walks long distances.  He does not have any

palpitation, no pain or pressure.  He does have dizziness if he stands up

too fast.



PAST MEDICAL HISTORY:  Somewhat difficult to ascertain, but he says he is

borderline diabetic.  He does have high blood pressure.  He does have high

cholesterol.  He has had a stroke before.  No history of heart attack.  No

cancer.  No hepatitis or tuberculosis.  No asthma or emphysema.  No

ulcers.  No kidney problems, liver problems, thyroid problems, anemia, or

arthritis.



ALLERGIES:  He is allergic to coconuts and not allergic to any

drugs.



SOCIAL HISTORY:  He does smoke approximately one pack every three days.  He

used to drink alcoholic beverages, but not anymore.  He uses medical

marijuana.



REVIEW OF SYSTEMS:  GASTROINTESTINAL:  He denies.  GENITOURINARY:  He

denies.  PULMONARY:  Positive for coughing and wheezing.  CONSTITUTIONAL:

Denies any fevers, chills, or night sweats.  NEUROLOGIC:  He denies.



PHYSICAL EXAMINATION:

GENERAL:  Shows to be a young gentleman, on a BiPAP therapy.

NECK:  Supple.  There is jugular venous distention.

LUNGS:  Inspiratory and expiratory wheezes.  Decreased breath sounds noted

bilaterally.

CARDIAC:  Regular rhythm.  Holosystolic regurgitant murmur noted.

ABDOMEN:  Obese.  Positive bowel sounds.  Abdominal wall edema is noted.

EXTREMITIES:  A 3+ edema of the lower extremities all the way up to his

abdominal wall.

NEUROLOGICAL:  Awake, alert, and responsive, in no apparent

distress.



LABORATORY AND DIAGNOSTIC DATA:  White count of 8.5, hemoglobin 14.6, and

platelet count 171.  PH is 7.35, pCO2 of 79, pO2 of 53, and bicarbonate of

43.  Sodium is 131, potassium 4.7, chloride 91, bicarbonate 42, BUN of 38,

and creatinine 1.2.  Liver function tests are abnormal at 131 and 183.

_____ 71.  CK of 291.  Troponin two sets negative.  ProBNP 3700.  Albumin

of 2.8.  Coags, INR is 1.1 and PTT of 29.  Urinalysis is fairly

unremarkable.  Toxicology screen is negative except for alcohol of 204.

Chest x-ray shows interstitial edema, congestive heart failure suspected.

Echocardiogram, which I personally reviewed, LV function appears to be

normal.  There is significant right ventricular and right atrial

enlargement and IVC dilation and pulmonary systolic pressures in the 70s.

Venous duplex study of the lower extremities shows no evidence of deep

venous thrombosis.



ASSESSMENT:

1. Cor pulmonale.

2. Hypoxemia.

3. Bronchospasm.

4. Tobacco dependence.

5. Borderline diabetes.

6. Hepatic congestion.



PLAN:  Dr. Plummer, this patient was seen in cardiac consultation.  He has

significant edema.  His blood pressure is somewhat borderline, but still

allows some _____ for diuresis.  His heart rates is in the 80s.  His

echocardiogram shows LV function, appears to be adequately function.  The

right ventricle is significantly enlarged.  The etiology of the right

ventricular enlargement and cor pulmonale needs to be determined, although

because of his wheezing, I suspect that this is probably related to his

lung injury and obesity.  Other etiologies to be ruled out eventually.  He

may need treatment for his pulmonary hypertension in the near future to

allow more treatment of his edema.  He should be continued on DuoNebs and

there is no evidence of deep venous thrombosis to suggest thromboembolic

disease, although he may require a ventilation/perfusion scan study at

some point or CT pulmonary angiography as well.  Agitated saline bubble

study will be ordered in the future if necessary.









  ______________________________________________

  Donavon Cosby M.D.





DR:  VAL[place]

D:  01/17/2018 20:05

T:  01/18/2018 01:08

JOB#:  [id number]

CC:

## 2018-01-18 NOTE — CARDIOLOGY PROGRESS NOTE
Assessment/Plan


Assessment/Plan


1. Cor pulmonale.


2. Hypoxemia.


3. Bronchospasm.


4. Tobacco dependence.


5. Borderline diabetes.


6. Hepatic congestion.


7. metabolic alkalosis





diamox 


diuretic 


bp is still fine


need pulm htn camacho at some point 


dupelx neg 


need v/q


ctpa ? 


eventually right heart study to see if need pulm htn meds





Subjective


Cardiovascular:  Denies: chest pain, lightheadedness


Respiratory:  Denies: shortness of breath


Gastrointestinal/Abdominal:  Denies: abdominal pain


Genitourinary:  Denies: burning





Objective





Last 24 Hour Vital Signs








  Date Time  Temp Pulse Resp B/P (MAP) Pulse Ox O2 Delivery O2 Flow Rate FiO2


 


1/18/18 16:00  93      


 


1/18/18 16:00 97.9 83 20 122/74 95 Nasal Cannula  


 


1/18/18 12:00  81      


 


1/18/18 12:00 97.8 81 20 118/77 100 Nasal Cannula  


 


1/18/18 09:03     93   


 


1/18/18 08:00 99.0 89 20 129/78 93 Bi-pap  


 


1/18/18 08:00  85      


 


1/18/18 06:55  95 18   Room Air  21


 


1/18/18 06:55  95 18  92   


 


1/18/18 04:00  87      


 


1/18/18 04:00 97.2 81 19 123/79 90 Bi-pap  


 


1/18/18 03:06  71 16  97 Facial  30


 


1/18/18 00:00 97.7 90 19 131/93 90 Bi-pap  


 


1/17/18 23:30  82 20  95 Facial  30


 


1/17/18 20:00 97.5 85 16 119/70 86   


 


1/17/18 20:00  87      


 


1/17/18 19:30  80 18  96 Facial  30








General Appearance:  alert


Neck:  JVD


Cardiovascular:  normal rate, regular rhythm


Respiratory/Chest:  decreased breath sounds, expiratory wheezing


Abdomen:  non tender, soft


Extremities:  moderate edema











Intake and Output  


 


 1/17/18 1/18/18





 19:00 07:00


 


Intake Total 640 ml 


 


Output Total 250 ml 950 ml


 


Balance 390 ml -950 ml


 


  


 


Intake Oral 640 ml 


 


Output Urine Total 250 ml 950 ml


 


# Voids 3 











Laboratory Tests








Test


  1/18/18


06:35


 


Sodium Level


  134 MMOL/L


(136-145)  L


 


Potassium Level


  3.3 MMOL/L


(3.5-5.1)  L


 


Chloride Level


  88 MMOL/L


()  L


 


Carbon Dioxide Level


  45 MMOL/L


(21-32)  *H


 


Anion Gap


  1 mmol/L


(5-15)  L


 


Blood Urea Nitrogen


  25 mg/dL


(7-18)  H


 


Creatinine


  0.9 MG/DL


(0.55-1.30)


 


Estimat Glomerular Filtration


Rate > 60 mL/min


(>60)


 


Glucose Level


  54 MG/DL


()  L


 


Calcium Level


  8.1 MG/DL


(8.5-10.1)  L


 


Troponin I


  0.028 ng/mL


(0.000-0.056)

















JEMAL GAMBOA Jan 18, 2018 18:10

## 2018-01-19 NOTE — DIAGNOSTIC IMAGING REPORT
Indications: Dyspnea

 

Technique: IV administration 5.1 mCi 99m technetium macroaggregated albumin. Images

obtained over the lungs in multiple projections. Subsequently, patient inhaled 42 mCi

aerosolized 99M technetium DTPA. Images obtained over the lungs in multiple

projections

 

Comparison: Correlation made to chest x-ray 1/18/2018

 

Findings: There is central clumping of radiotracer. This may be related to COPD.

There are a number of perfusion defects throughout the bilateral lungs. These are

mostly matched however evaluation is limited given artifact from clumping of

radiotracer. As such, the presence or absence of pulmonary emboli cannot reliably be

assessed.

 

IMPRESSION: 

Limited exam with central clumping of radiotracer, possibly related to COPD. Unable

to reliably assess for presence or absence of pulmonary emboli. Pulmonary embolism

protocol CT angiogram of the chest recommended.

 

Findings and imaging follow-up recommendations discussed with the patient's treating

nurse on 2 E via telephone conversation.  RN to convey results and recommendations to

ordering MD.

## 2018-01-19 NOTE — CARDIOLOGY PROGRESS NOTE
Assessment/Plan


Assessment/Plan


1. Cor pulmonale.


2. Hypoxemia.


3. Bronchospasm.


4. Tobacco dependence.


5. Borderline diabetes.


6. Hepatic congestion.


7. metabolic alkalosis





diamox 


diuretic decrease dose 


bp is still fine


need pulm htn camacho at some point 


dupelx neg 


nondiagnostic v/q


ctpa ? 


eventually right heart study


revatio trial





Subjective


Cardiovascular:  Denies: chest pain, lightheadedness


Respiratory:  Denies: shortness of breath


Gastrointestinal/Abdominal:  Denies: abdominal pain


Genitourinary:  Denies: burning





Objective





Last 24 Hour Vital Signs








  Date Time  Temp Pulse Resp B/P (MAP) Pulse Ox O2 Delivery O2 Flow Rate FiO2


 


1/19/18 12:30 97.0 75 19 106/76 95 Nasal Cannula  


 


1/19/18 12:00  83      


 


1/19/18 08:00 97.1 78 18 119/81 97 Nasal Cannula  


 


1/19/18 08:00  89      


 


1/19/18 04:00 97.9 85 20 117/88 93   


 


1/19/18 04:00  82      


 


1/19/18 00:00  80      


 


1/19/18 00:00 98.1 84 20 126/76 95   


 


1/18/18 23:10  83 20  96 Nasal Cannula 3.0 32


 


1/18/18 23:08     96 Nasal Cannula 3.0 32


 


1/18/18 23:08      Nasal Cannula 3.0 32


 


1/18/18 23:00  81 20  95 Nasal Cannula 3.0 32


 


1/18/18 20:01  84 18   Room Air  21


 


1/18/18 20:00 97.0 87 20 122/84 90   


 


1/18/18 20:00  84      








General Appearance:  alert


Neck:  supple


Cardiovascular:  normal rate, regular rhythm


Respiratory/Chest:  decreased breath sounds


Abdomen:  normal bowel sounds, non tender, soft


Extremities:  moderate edema











Intake and Output  


 


 1/18/18 1/19/18





 19:00 07:00


 


Intake Total 980 ml 400 ml


 


Output Total 700 ml 1810 ml


 


Balance 280 ml -1410 ml


 


  


 


Intake Oral 980 ml 400 ml


 


Output Urine Total 700 ml 1810 ml


 


# Voids 4 


 


# Bowel Movements 1 











Laboratory Tests








Test


  1/19/18


06:20


 


White Blood Count


  6.5 K/UL


(4.8-10.8)


 


Red Blood Count


  5.72 M/UL


(4.70-6.10)


 


Hemoglobin


  15.5 G/DL


(14.2-18.0)


 


Hematocrit


  51.3 %


(42.0-52.0)


 


Mean Corpuscular Volume 90 FL (80-99)  


 


Mean Corpuscular Hemoglobin


  27.1 PG


(27.0-31.0)


 


Mean Corpuscular Hemoglobin


Concent 30.2 G/DL


(32.0-36.0)  L


 


Red Cell Distribution Width


  16.3 %


(11.6-14.8)  H


 


Platelet Count


  155 K/UL


(150-450)


 


Mean Platelet Volume


  7.4 FL


(6.5-10.1)


 


Neutrophils (%) (Auto)


  66.9 %


(45.0-75.0)


 


Lymphocytes (%) (Auto)


  14.9 %


(20.0-45.0)  L


 


Monocytes (%) (Auto)


  16.7 %


(1.0-10.0)  H


 


Eosinophils (%) (Auto)


  0.2 %


(0.0-3.0)


 


Basophils (%) (Auto)


  1.4 %


(0.0-2.0)


 


Sodium Level


  135 MMOL/L


(136-145)  L


 


Potassium Level


  3.2 MMOL/L


(3.5-5.1)  L


 


Chloride Level


  88 MMOL/L


()  L


 


Carbon Dioxide Level


  > 45 MMOL/L


(21-32)  *H


 


Blood Urea Nitrogen


  17 mg/dL


(7-18)


 


Creatinine


  0.8 MG/DL


(0.55-1.30)


 


Estimat Glomerular Filtration


Rate > 60 mL/min


(>60)


 


Glucose Level


  66 MG/DL


()  L


 


Calcium Level


  8.0 MG/DL


(8.5-10.1)  L


 


Phosphorus Level


  3.5 MG/DL


(2.5-4.9)


 


Magnesium Level


  0.9 MG/DL


(1.8-2.4)  *L


 


Total Bilirubin


  1.7 MG/DL


(0.2-1.0)  H


 


Direct Bilirubin


  0.7 MG/DL


(0.0-0.3)  H


 


Aspartate Amino Transf


(AST/SGOT) 58 U/L (15-37)


H


 


Alanine Aminotransferase


(ALT/SGPT) 108 U/L


(12-78)  H


 


Alkaline Phosphatase


  82 U/L


()


 


Troponin I


  0.037 ng/mL


(0.000-0.056)


 


Total Protein


  5.8 G/DL


(6.4-8.2)  L


 


Albumin


  2.6 G/DL


(3.4-5.0)  L


 


Globulin 3.2 g/dL  


 


Albumin/Globulin Ratio


  0.8 (1.0-2.7)


L











Microbiology








 Date/Time


Source Procedure


Growth Status


 


 


 1/17/18 02:30


Nasal Nares MRSA Culture - Final


NO METHICILLIN RESISTANT STAPH AUREUS... Complete


 


 1/17/18 02:30


Rectum VRE Culture - Final


NO VANCOMYCIN RESISTANT ENTEROCOCCUS ... Complete

















JEMAL GAMBOA Jan 19, 2018 17:32

## 2018-01-19 NOTE — CONSULTATION
History of Present Illness


General


Date patient seen:  Jan 19, 2018


Chief Complaint:  Dyspnea/Respdistress





Present Illness


HPI


46 yo male with hx of mmp and dd and anxiety who presented to the hospital 

because of shortness of breath, somewhat of


a poor historian, therefore information is obtained from review of the


patient's chart as well as from discussion with the patient. the pt has anxiety 

and perseverates about his medical issues


Allergies:  


Coded Allergies:  


     No Known Allergies (Unverified , 1/16/18)





Medication History


Scheduled


Glyburide (Glyburide), 5 MG PO BID, (Reported)


Levalbuterol Tartrate (Levalbuterol Tartrate Hfa), 45 MCG IH QID, (Reported)


Naproxen Sodium (Naproxen Sodium), 375 MG ORAL TWICE A DAY, (Reported)





Scheduled PRN


Tramadol Hcl* (Ultram*), 50 MG ORAL Q6H PRN for For Pain, (Reported)





Miscellaneous Medications


Blood Sugar Diagnostic (Freestyle Lite Test Strips), 1 EACH MC, (Reported)





Patient History


Healthcare decision maker





Resuscitation status


Full Code


Advanced Directive on File








Past Medical/Surgical History


Past Medical/Surgical History:  


(1) Hepatic congestion


(2) Anasarca


(3) CHF (congestive heart failure)


(4) Hypoxia


(5) Cor pulmonale


(6) Diabetes mellitus


(7) Pulmonary edema





Review of Systems


Psychiatric:  Reports: prior hx, anxiety, depressed feelings, emotional problems





Physical Exam


General Appearance:  no apparent distress, alert, agitated


Neurologic:  alert, oriented x 3, responsive, depressed affect





Last 24 Hour Vital Signs








  Date Time  Temp Pulse Resp B/P (MAP) Pulse Ox O2 Delivery O2 Flow Rate FiO2


 


1/19/18 08:00 97.1 78 18 119/81 97 Nasal Cannula  


 


1/19/18 08:00  89      


 


1/19/18 04:00 97.9 85 20 117/88 93   


 


1/19/18 04:00  82      


 


1/19/18 00:00  80      


 


1/19/18 00:00 98.1 84 20 126/76 95   


 


1/18/18 23:10  83 20  96 Nasal Cannula 3.0 32


 


1/18/18 23:08     96 Nasal Cannula 3.0 32


 


1/18/18 23:08      Nasal Cannula 3.0 32


 


1/18/18 23:00  81 20  95 Nasal Cannula 3.0 32


 


1/18/18 20:01  84 18   Room Air  21


 


1/18/18 20:00 97.0 87 20 122/84 90   


 


1/18/18 20:00  84      


 


1/18/18 16:00  93      


 


1/18/18 16:00 97.9 83 20 122/74 95 Nasal Cannula  

















Intake and Output  


 


 1/18/18 1/19/18





 19:00 07:00


 


Intake Total 980 ml 400 ml


 


Output Total 700 ml 1810 ml


 


Balance 280 ml -1410 ml


 


  


 


Intake Oral 980 ml 400 ml


 


Output Urine Total 700 ml 1810 ml


 


# Voids 4 


 


# Bowel Movements 1 











Laboratory Tests








Test


  1/19/18


06:20


 


White Blood Count


  6.5 K/UL


(4.8-10.8)


 


Red Blood Count


  5.72 M/UL


(4.70-6.10)


 


Hemoglobin


  15.5 G/DL


(14.2-18.0)


 


Hematocrit


  51.3 %


(42.0-52.0)


 


Mean Corpuscular Volume 90 FL (80-99)  


 


Mean Corpuscular Hemoglobin


  27.1 PG


(27.0-31.0)


 


Mean Corpuscular Hemoglobin


Concent 30.2 G/DL


(32.0-36.0)  L


 


Red Cell Distribution Width


  16.3 %


(11.6-14.8)  H


 


Platelet Count


  155 K/UL


(150-450)


 


Mean Platelet Volume


  7.4 FL


(6.5-10.1)


 


Neutrophils (%) (Auto)


  66.9 %


(45.0-75.0)


 


Lymphocytes (%) (Auto)


  14.9 %


(20.0-45.0)  L


 


Monocytes (%) (Auto)


  16.7 %


(1.0-10.0)  H


 


Eosinophils (%) (Auto)


  0.2 %


(0.0-3.0)


 


Basophils (%) (Auto)


  1.4 %


(0.0-2.0)


 


Sodium Level


  135 MMOL/L


(136-145)  L


 


Potassium Level


  3.2 MMOL/L


(3.5-5.1)  L


 


Chloride Level


  88 MMOL/L


()  L


 


Carbon Dioxide Level


  > 45 MMOL/L


(21-32)  *H


 


Blood Urea Nitrogen


  17 mg/dL


(7-18)


 


Creatinine


  0.8 MG/DL


(0.55-1.30)


 


Estimat Glomerular Filtration


Rate > 60 mL/min


(>60)


 


Glucose Level


  66 MG/DL


()  L


 


Calcium Level


  8.0 MG/DL


(8.5-10.1)  L


 


Phosphorus Level


  3.5 MG/DL


(2.5-4.9)


 


Magnesium Level


  0.9 MG/DL


(1.8-2.4)  *L


 


Total Bilirubin


  1.7 MG/DL


(0.2-1.0)  H


 


Direct Bilirubin


  0.7 MG/DL


(0.0-0.3)  H


 


Aspartate Amino Transf


(AST/SGOT) 58 U/L (15-37)


H


 


Alanine Aminotransferase


(ALT/SGPT) 108 U/L


(12-78)  H


 


Alkaline Phosphatase


  82 U/L


()


 


Troponin I


  0.037 ng/mL


(0.000-0.056)


 


Total Protein


  5.8 G/DL


(6.4-8.2)  L


 


Albumin


  2.6 G/DL


(3.4-5.0)  L


 


Globulin 3.2 g/dL  


 


Albumin/Globulin Ratio


  0.8 (1.0-2.7)


L








Height (Feet):  6


Height (Inches):  2.00


Weight (Pounds):  266


Medications





Current Medications








 Medications


  (Trade)  Dose


 Ordered  Sig/German


 Route


 PRN Reason  Start Time


 Stop Time Status Last Admin


Dose Admin


 


 Acetaminophen


  (Tylenol)  650 mg  Q4H  PRN


 ORAL


 T>100.5  1/17/18 20:15


 2/16/18 00:14   


 


 


 Acetaminophen


  (Tylenol)  650 mg  Q4H  PRN


 ORAL


 Mild Pain/Temp > 100.5  1/18/18 22:15


 2/17/18 22:14  1/18/18 22:44


 


 


 Acetaminophen/


 Hydrocodone Bitart


  (Norco 5/325)  1 tab  Q6H  PRN


 ORAL


 For Pain Scale Level 4-10  1/18/18 20:00


 1/25/18 19:59   


 


 


 Acetazolamide


  (Diamox)  250 mg  TWICE A  DAY


 ORAL


   1/18/18 19:30


 1/20/18 09:01  1/19/18 08:24


 


 


 Albuterol/


 Ipratropium


  (Albuterol/


 Ipratropium)  3 ml  Q4H  PRN


 HHN


 Shortness of Breath  1/17/18 18:00


 1/22/18 17:59  1/18/18 23:00


 


 


 Dextrose


  (Dextrose 50%)    STAT  PRN


 IV


 Hypoglycemia  1/17/18 18:00


 2/16/18 17:59   


 


 


 Furosemide


  (Lasix)  40 mg  EVERY 8  HOURS


 IV


   1/17/18 22:00


 2/16/18 05:59  1/19/18 06:27


 


 


 Heparin Sodium


  (Porcine)


  (Heparin 5000


 units/ml)  5,000 units  EVERY 12  HOURS


 SUBQ


   1/17/18 21:00


 2/16/18 08:59  1/19/18 08:26


 


 


 Insulin Aspart


  (NovoLOG)    BEFORE MEALS AND  HS


 SUBQ


   1/17/18 21:00


 2/16/18 06:29  1/19/18 12:51


 


 


 Ondansetron HCl


  (Zofran)  4 mg  Q6H  PRN


 IVP


 Nausea & Vomiting  1/17/18 18:15


 2/16/18 00:14   


 


 


 Polyethylene


 Glycol


  (Miralax)  17 gm  DAILYPRN  PRN


 ORAL


 Constipation  1/17/18 18:00


 2/16/18 17:59   


 


 


 Quetiapine


 Fumarate


  (SEROquel)  25 mg  Q4H  PRN


 ORAL


 agitation  1/17/18 18:00


 2/16/18 17:59   


 


 


 Temazepam


  (Restoril)  15 mg  HSPRN  PRN


 ORAL


 Insomnia  1/17/18 21:00


 1/24/18 20:59   


 











Assessment/Plan


Status:  stable


Assessment/Plan


mdd


anxiety d/o





klonopin


seroюлияl


Leland Urbano M.D. Jan 19, 2018 13:15

## 2018-01-19 NOTE — PULMONOLOGY PROGRESS NOTE
Assessment/Plan


Problems:  


(1) Pulmonary edema


(2) Anasarca


(3) CHF (congestive heart failure)


(4) Hypoxia


(5) Cor pulmonale


(6) Diabetes mellitus


Assessment/Plan


on lasix 40 


check electrolytes


?etiology of pulmonary hypertension


echo reviewed


cardiology evaluation


sliding scale


add ancef


ID to see.





Subjective


ROS Limited/Unobtainable:  No


Constitutional:  Reports: no symptoms


HEENT:  Repors: no symptoms


Allergies:  


Coded Allergies:  


     No Known Allergies (Unverified , 1/16/18)





Objective





Last 24 Hour Vital Signs








  Date Time  Temp Pulse Resp B/P (MAP) Pulse Ox O2 Delivery O2 Flow Rate FiO2


 


1/19/18 16:00 96.7 83 19 128/78 100 Nasal Cannula  


 


1/19/18 16:00  70      


 


1/19/18 12:30 97.0 75 19 106/76 95 Nasal Cannula  


 


1/19/18 12:00  83      


 


1/19/18 08:00 97.1 78 18 119/81 97 Nasal Cannula  


 


1/19/18 08:00  89      


 


1/19/18 04:00 97.9 85 20 117/88 93   


 


1/19/18 04:00  82      


 


1/19/18 00:00  80      


 


1/19/18 00:00 98.1 84 20 126/76 95   


 


1/18/18 23:10  83 20  96 Nasal Cannula 3.0 32


 


1/18/18 23:08     96 Nasal Cannula 3.0 32


 


1/18/18 23:08      Nasal Cannula 3.0 32


 


1/18/18 23:00  81 20  95 Nasal Cannula 3.0 32


 


1/18/18 20:01  84 18   Room Air  21


 


1/18/18 20:00 97.0 87 20 122/84 90   


 


1/18/18 20:00  84      

















Intake and Output  


 


 1/18/18 1/19/18





 19:00 07:00


 


Intake Total 980 ml 400 ml


 


Output Total 700 ml 1810 ml


 


Balance 280 ml -1410 ml


 


  


 


Intake Oral 980 ml 400 ml


 


Output Urine Total 700 ml 1810 ml


 


# Voids 4 


 


# Bowel Movements 1 








General Appearance:  WD/WN


HEENT:  normocephalic


Respiratory/Chest:  chest wall non-tender, lungs clear, chest wall tender


Cardiovascular:  normal rate, no gallop/murmur


Extremities:  other - edema





Microbiology








 Date/Time


Source Procedure


Growth Status


 


 


 1/17/18 02:30


Nasal Nares MRSA Culture - Final


NO METHICILLIN RESISTANT STAPH AUREUS... Complete


 


 1/17/18 02:30


Rectum VRE Culture - Final


NO VANCOMYCIN RESISTANT ENTEROCOCCUS ... Complete








Laboratory Tests


1/19/18 06:20: 


White Blood Count 6.5, Red Blood Count 5.72, Hemoglobin 15.5, Hematocrit 51.3, 

Mean Corpuscular Volume 90, Mean Corpuscular Hemoglobin 27.1, Mean Corpuscular 

Hemoglobin Concent 30.2L, Red Cell Distribution Width 16.3H, Platelet Count 155

, Mean Platelet Volume 7.4, Neutrophils (%) (Auto) 66.9, Lymphocytes (%) (Auto) 

14.9L, Monocytes (%) (Auto) 16.7H, Eosinophils (%) (Auto) 0.2, Basophils (%) (

Auto) 1.4, Sodium Level 135L, Potassium Level 3.2L, Chloride Level 88L, Carbon 

Dioxide Level > 45*H, Blood Urea Nitrogen 17, Creatinine 0.8, Estimat 

Glomerular Filtration Rate > 60, Glucose Level 66L, Calcium Level 8.0L, 

Phosphorus Level 3.5, Magnesium Level 0.9*L, Total Bilirubin 1.7H, Direct 

Bilirubin 0.7H, Aspartate Amino Transf (AST/SGOT) 58H, Alanine Aminotransferase 

(ALT/SGPT) 108H, Alkaline Phosphatase 82, Troponin I 0.037, Total Protein 5.8L, 

Albumin 2.6L, Globulin 3.2, Albumin/Globulin Ratio 0.8L





Current Medications








 Medications


  (Trade)  Dose


 Ordered  Sig/German


 Route


 PRN Reason  Start Time


 Stop Time Status Last Admin


Dose Admin


 


 Acetaminophen


  (Tylenol)  650 mg  Q4H  PRN


 ORAL


 T>100.5  1/17/18 20:15


 2/16/18 00:14   


 


 


 Acetaminophen


  (Tylenol)  650 mg  Q4H  PRN


 ORAL


 Mild Pain/Temp > 100.5  1/18/18 22:15


 2/17/18 22:14  1/18/18 22:44


 


 


 Acetaminophen/


 Hydrocodone Bitart


  (Norco 5/325)  1 tab  Q6H  PRN


 ORAL


 For Pain Scale Level 4-10  1/18/18 20:00


 1/25/18 19:59   


 


 


 Acetazolamide


  (Diamox)  250 mg  TWICE A  DAY


 ORAL


   1/18/18 19:30


 1/20/18 09:01  1/19/18 17:01


 


 


 Albuterol/


 Ipratropium


  (Albuterol/


 Ipratropium)  3 ml  Q4H  PRN


 HHN


 Shortness of Breath  1/17/18 18:00


 1/22/18 17:59  1/18/18 23:00


 


 


 Cefazolin Sodium  50 ml @ 


 100 mls/hr  Q8H


 IV


   1/19/18 20:00


 1/26/18 23:59   


 


 


 Clonazepam


  (KlonoPIN)  1 mg  DAILY  PRN


 ORAL


 For Anxiety  1/19/18 13:15


 1/26/18 13:14   


 


 


 Dextrose


  (Dextrose 50%)    STAT  PRN


 IV


 Hypoglycemia  1/17/18 18:00


 2/16/18 17:59   


 


 


 Escitalopram


 Oxalate


  (Lexapro)  10 mg  DAILY


 ORAL


   1/20/18 09:00


 2/19/18 08:59   


 


 


 Furosemide


  (Lasix)  40 mg  Q12HR


 IV


   1/19/18 21:00


 2/18/18 20:59   


 


 


 Heparin Sodium


  (Porcine)


  (Heparin 5000


 units/ml)  5,000 units  EVERY 12  HOURS


 SUBQ


   1/17/18 21:00


 2/16/18 08:59  1/19/18 08:26


 


 


 Insulin Aspart


  (NovoLOG)    BEFORE MEALS AND  HS


 SUBQ


   1/17/18 21:00


 2/16/18 06:29  1/19/18 16:53


 


 


 Ondansetron HCl


  (Zofran)  4 mg  Q6H  PRN


 IVP


 Nausea & Vomiting  1/17/18 18:15


 2/16/18 00:14   


 


 


 Polyethylene


 Glycol


  (Miralax)  17 gm  DAILYPRN  PRN


 ORAL


 Constipation  1/17/18 18:00


 2/16/18 17:59   


 


 


 Quetiapine


 Fumarate


  (SEROquel)  25 mg  Q4H  PRN


 ORAL


 agitation  1/17/18 18:00


 2/16/18 17:59   


 


 


 Quetiapine


 Fumarate


  (SEROquel)  100 mg  BEDTIME


 ORAL


   1/19/18 21:00


 2/18/18 20:59   


 


 


 Sildenafil Citrate


  (Revatio)  10 mg  Q8HR


 ORAL


   1/19/18 18:30


 2/18/18 18:29  1/19/18 18:04


 


 


 Temazepam


  (Restoril)  15 mg  HSPRN  PRN


 ORAL


 Insomnia  1/17/18 21:00


 1/24/18 20:59   


 

















BALJIT JORDAN Jan 19, 2018 19:06

## 2018-01-20 NOTE — PULMONOLOGY PROGRESS NOTE
Assessment/Plan


Assessment/Plan


ASSESSMENT


Cor pulmonale 


acute hypoxemic hypercapnic respiratory failure -resolved 


Severe pulmonary HTN


acute pulmonary edema


Hypoxemia


Bronchospasm 


metabolic alkalosis 


CHF with pulmonary and hepatic congestion 


B/L leg pustular lesions- concerning for bacterial superinfection after skin 

trauma from scratching


anasarca


Morbid obesity 


Tobacco dependency


DM


MDD 


anxiety disorder  


hypo Mg 





PLAN OF CARE 


tele


diuresis, monitor cardiorenal parameters, volumes


fup with CXR 


cardio follows 


IV diuresis, monitor volumes, cardiorenal parameters 


ECHO with EF 50% and RVSP of 75 with mild LV hypokinesis with abnormal septal 

motion.


Trial of Revatio


Started on Diamox for 2 days in hope to reset CO2, better today ,  


Need pulmonary HTN w/up as outpt and eventually R heart study


Venous Duplex LE negative 


VQ scan inconclusive 


will try CTA chest on Monday if no improvement clinically


Tox screen negative 


DVT prophylaxis


BS management with SS of insulin 


trend bili LFT


Monitor lytes, replace as needed 


psych follows, optimized psych med regimen    


ID follows, on Bactrim 


replace Mg





case discussed and evaluated by supervising physician





Subjective


Allergies:  


Coded Allergies:  


     No Known Allergies (Unverified , 1/16/18)


Subjective


denies chest pain, intermittent SOB 


CO2  on ABG with improvement but still met alkalosis, likely element of chronic





Objective





Last 24 Hour Vital Signs








  Date Time  Temp Pulse Resp B/P (MAP) Pulse Ox O2 Delivery O2 Flow Rate FiO2


 


1/20/18 04:00 97.0 76 20 116/77 96   


 


1/20/18 01:26  71 16  97 Facial  30


 


1/20/18 00:00 97.0 80 20 139/86 96   


 


1/20/18 00:00  85      


 


1/20/18 00:00   20  96 Room Air  


 


1/19/18 20:01  72 20  100 Room Air  


 


1/19/18 20:00  73      


 


1/19/18 20:00   20  90 Room Air  


 


1/19/18 20:00 97.7 78 20 123/84 90   


 


1/19/18 19:54      Nasal Cannula 2.0 28


 


1/19/18 19:51     97 Room Air  


 


1/19/18 19:50  75 20  97 Room Air  


 


1/19/18 19:50  75 18   Room Air  21


 


1/19/18 16:00 96.7 83 19 128/78 100 Nasal Cannula  


 


1/19/18 16:00  70      


 


1/19/18 12:30 97.0 75 19 106/76 95 Nasal Cannula  


 


1/19/18 12:00  83      


 


1/19/18 08:00 97.1 78 18 119/81 97 Nasal Cannula  


 


1/19/18 08:00  89      

















Intake and Output  


 


 1/19/18 1/20/18





 19:00 07:00


 


Intake Total 972 ml 236 ml


 


Output Total 1100 ml 1200 ml


 


Balance -128 ml -964 ml


 


  


 


Intake Oral 772 ml 236 ml


 


IV Total 200 ml 


 


Output Urine Total 1100 ml 1200 ml








General Appearance:  no acute distress, other - A/A/O x 3 obese  male


HEENT:  normocephalic, atraumatic, anicteric


Respiratory/Chest:  lungs clear, no respiratory distress, no accessory muscle 

use


Cardiovascular:  normal peripheral pulses, normal rate, regular rhythm - SR on 

tele


Abdomen:  normal bowel sounds, soft, non tender - obese


Extremities:  other - +2 edema BLE


Skin:  other - BLE pustular lesions with surrounding erythema, no drainage


Neurologic/Psychiatric:  no motor/sensory deficits, alert, oriented x 3, 

responsive


Musculoskeletal:  normal muscle bulk





Current Medications








 Medications


  (Trade)  Dose


 Ordered  Sig/German


 Route


 PRN Reason  Start Time


 Stop Time Status Last Admin


Dose Admin


 


 Acetaminophen


  (Tylenol)  650 mg  Q4H  PRN


 ORAL


 T>100.5  1/17/18 20:15


 2/16/18 00:14   


 


 


 Acetaminophen


  (Tylenol)  650 mg  Q4H  PRN


 ORAL


 Mild Pain/Temp > 100.5  1/18/18 22:15


 2/17/18 22:14  1/18/18 22:44


 


 


 Acetaminophen/


 Hydrocodone Bitart


  (Norco 5/325)  1 tab  Q6H  PRN


 ORAL


 For Pain Scale Level 4-10  1/18/18 20:00


 1/25/18 19:59   


 


 


 Acetazolamide


  (Diamox)  250 mg  TWICE A  DAY


 ORAL


   1/18/18 19:30


 1/20/18 09:01  1/19/18 17:01


 


 


 Albuterol/


 Ipratropium


  (Albuterol/


 Ipratropium)  3 ml  Q4H  PRN


 HHN


 Shortness of Breath  1/17/18 18:00


 1/22/18 17:59  1/19/18 19:53


 


 


 Cefazolin Sodium  50 ml @ 


 100 mls/hr  Q8H


 IV


   1/19/18 20:00


 1/26/18 23:59  1/20/18 04:50


 


 


 Clonazepam


  (KlonoPIN)  1 mg  DAILY  PRN


 ORAL


 For Anxiety  1/19/18 13:15


 1/26/18 13:14   


 


 


 Dextrose


  (Dextrose 50%)    STAT  PRN


 IV


 Hypoglycemia  1/17/18 18:00


 2/16/18 17:59   


 


 


 Escitalopram


 Oxalate


  (Lexapro)  10 mg  DAILY


 ORAL


   1/20/18 09:00


 2/19/18 08:59   


 


 


 Furosemide


  (Lasix)  40 mg  Q12HR


 IV


   1/19/18 21:00


 2/18/18 20:59  1/19/18 21:07


 


 


 Heparin Sodium


  (Porcine)


  (Heparin 5000


 units/ml)  5,000 units  EVERY 12  HOURS


 SUBQ


   1/17/18 21:00


 2/16/18 08:59  1/19/18 21:13


 


 


 Insulin Aspart


  (NovoLOG)    BEFORE MEALS AND  HS


 SUBQ


   1/17/18 21:00


 2/16/18 06:29  1/19/18 21:14


 


 


 Ondansetron HCl


  (Zofran)  4 mg  Q6H  PRN


 IVP


 Nausea & Vomiting  1/17/18 18:15


 2/16/18 00:14   


 


 


 Polyethylene


 Glycol


  (Miralax)  17 gm  DAILYPRN  PRN


 ORAL


 Constipation  1/17/18 18:00


 2/16/18 17:59   


 


 


 Quetiapine


 Fumarate


  (SEROquel)  25 mg  Q4H  PRN


 ORAL


 agitation  1/17/18 18:00


 2/16/18 17:59   


 


 


 Quetiapine


 Fumarate


  (SEROquel)  100 mg  BEDTIME


 ORAL


   1/19/18 21:00


 2/18/18 20:59  1/19/18 21:08


 


 


 Sildenafil Citrate


  (Revatio)  10 mg  Q8HR


 ORAL


   1/19/18 18:30


 2/18/18 18:29  1/20/18 06:31


 


 


 Temazepam


  (Restoril)  15 mg  HSPRN  PRN


 ORAL


 Insomnia  1/17/18 21:00


 1/24/18 20:59   


 

















Monica Anthony NP ([facility]) Jan 20, 2018 07:31

## 2018-01-20 NOTE — CARDIOLOGY PROGRESS NOTE
Assessment/Plan


Problem List:  


(1) Anasarca


(2) CHF (congestive heart failure)


(3) Hypoxia


(4) Cor pulmonale


Status:  stable, progressing


Status Narrative


Mr. Hu has cor pulmonale, w/ dyspnea, pulmonary and  hepatic congestion and LE 

edema. 


He is on lasix and diamox. Severe met alkalosis.


Assessment/Plan


Continue diuresis w/ diamox and lasix.


Revatio for pulm hypertension


Eventual rt/lt heart catheterization.


Followup labs in am.





Subjective


ROS Limited/Unobtainable:  No


Subjective


Cardiology for Dr. Cosby





Pt c/o leg pain, swelling





Objective





Last 24 Hour Vital Signs








  Date Time  Temp Pulse Resp B/P (MAP) Pulse Ox O2 Delivery O2 Flow Rate FiO2


 


1/20/18 08:00 98.4 82 20 114/72 96   


 


1/20/18 07:53      Room Air  21


 


1/20/18 07:53  59 22   Room Air  21


 


1/20/18 07:53     98 Room Air  21


 


1/20/18 04:00  81      


 


1/20/18 04:00 97.0 76 20 116/77 96   


 


1/20/18 01:26  71 16  97 Facial  30


 


1/20/18 00:00 97.0 80 20 139/86 96   


 


1/20/18 00:00  85      


 


1/20/18 00:00   20  96 Room Air  


 


1/19/18 20:01  72 20  100 Room Air  


 


1/19/18 20:00  73      


 


1/19/18 20:00   20  90 Room Air  


 


1/19/18 20:00 97.7 78 20 123/84 90   


 


1/19/18 19:54      Nasal Cannula 2.0 28


 


1/19/18 19:51     97 Room Air  


 


1/19/18 19:50  75 20  97 Room Air  


 


1/19/18 19:50  75 18   Room Air  21


 


1/19/18 16:00 96.7 83 19 128/78 100 Nasal Cannula  


 


1/19/18 16:00  70      








General Appearance:  WD/WN, no apparent distress, alert


EENT:  PERRL/EOMI


Neck:  normal alignment, supple, no JVD


Rhythm:  NSR


Cardiovascular:  normal rate, regular rhythm, no gallop/murmur


Respiratory/Chest:  other - few crackles at bases bilat


Abdomen:  non tender, soft


Extremities:  moderate edema - bilat LE edema - feet to calf.  + erythema and 

superficial, small ulcerated lesions of legs











Intake and Output  


 


 1/19/18 1/20/18





 19:00 07:00


 


Intake Total 972 ml 236 ml


 


Output Total 1100 ml 1200 ml


 


Balance -128 ml -964 ml


 


  


 


Intake Oral 772 ml 236 ml


 


IV Total 200 ml 


 


Output Urine Total 1100 ml 1200 ml











Laboratory Tests








Test


  1/20/18


06:30 1/20/18


10:25


 


White Blood Count


  7.5 K/UL


(4.8-10.8) 


 


 


Red Blood Count


  5.86 M/UL


(4.70-6.10) 


 


 


Hemoglobin


  15.8 G/DL


(14.2-18.0) 


 


 


Hematocrit


  52.1 %


(42.0-52.0)  H 


 


 


Mean Corpuscular Volume 89 FL (80-99)   


 


Mean Corpuscular Hemoglobin


  27.0 PG


(27.0-31.0) 


 


 


Mean Corpuscular Hemoglobin


Concent 30.4 G/DL


(32.0-36.0)  L 


 


 


Red Cell Distribution Width


  16.1 %


(11.6-14.8)  H 


 


 


Platelet Count


  160 K/UL


(150-450) 


 


 


Mean Platelet Volume


  7.8 FL


(6.5-10.1) 


 


 


Neutrophils (%) (Auto)


  72.9 %


(45.0-75.0) 


 


 


Lymphocytes (%) (Auto)


  11.1 %


(20.0-45.0)  L 


 


 


Monocytes (%) (Auto)


  14.6 %


(1.0-10.0)  H 


 


 


Eosinophils (%) (Auto)


  0.1 %


(0.0-3.0) 


 


 


Basophils (%) (Auto)


  1.4 %


(0.0-2.0) 


 


 


Sodium Level


  132 MMOL/L


(136-145)  L 


 


 


Potassium Level


  3.8 MMOL/L


(3.5-5.1) 


 


 


Chloride Level


  87 MMOL/L


()  L 


 


 


Carbon Dioxide Level


  41 MMOL/L


(21-32)  *H 


 


 


Anion Gap


  4 mmol/L


(5-15)  L 


 


 


Blood Urea Nitrogen


  12 mg/dL


(7-18) 


 


 


Creatinine


  0.7 MG/DL


(0.55-1.30) 


 


 


Estimat Glomerular Filtration


Rate > 60 mL/min


(>60) 


 


 


Glucose Level


  88 MG/DL


() 


 


 


Calcium Level


  7.1 MG/DL


(8.5-10.1)  L 


 


 


Phosphorus Level


  3.6 MG/DL


(2.5-4.9) 


 


 


Magnesium Level


  1.1 MG/DL


(1.8-2.4)  L 


 


 


Total Bilirubin


  1.5 MG/DL


(0.2-1.0)  H 


 


 


Direct Bilirubin


  0.6 MG/DL


(0.0-0.3)  H 


 


 


Aspartate Amino Transf


(AST/SGOT) 41 U/L (15-37)


H 


 


 


Alanine Aminotransferase


(ALT/SGPT) 94 U/L (12-78)


H 


 


 


Alkaline Phosphatase


  81 U/L


() 


 


 


Total Protein


  5.8 G/DL


(6.4-8.2)  L 


 


 


Albumin


  2.6 G/DL


(3.4-5.0)  L 


 


 


Globulin 3.2 g/dL   


 


Albumin/Globulin Ratio


  0.8 (1.0-2.7)


L 


 


 


Arterial Blood pH


  


  7.449


(7.350-7.450)


 


Arterial Blood Partial


Pressure CO2 


  62.4 mmHg


(35.0-45.0)  *H


 


Arterial Blood Partial


Pressure O2 


  54.5 mmHg


(75.0-100.0)  L


 


Arterial Blood HCO3


  


  54.5 mmol/L


(22.0-26.0)  H


 


Arterial Blood Oxygen


Saturation 


  88.0 %


(92.0-98.0)  L


 


Arterial Blood Base Excess  14.6  


 


Frandy Test  Positive  

















NIDHI PEDERSEN Jan 20, 2018 13:30

## 2018-01-20 NOTE — CONSULTATION
History of Present Illness


General


Date patient seen:  Jan 20, 2018


Time patient seen:  11:04


Chief Complaint:  Dyspnea/Respdistress





Present Illness


HPI


46 y/o M with hx of Anxiety, DM2, HTN, CHF, obesity, hx of CVA, tobacco abuse, 

prior ethanol abuse, [facility] facility resident presents to ED on 1/16 with 

SOB, LE edema, [place]. In ED laureen o have anasarca, was desaturating into the 70s 

and placed on BIpap





Denied CP, hemoptysis, fevers, cough, dysuria,n/v/d upon admission





Afebrile, no leukocytois. Started on IV ancef yesterday for possible leg 

infection.


Allergies:  


Coded Allergies:  


     No Known Allergies (Unverified , 1/16/18)





Medication History


Scheduled


Glyburide (Glyburide), 5 MG PO BID, (Reported)


Levalbuterol Tartrate (Levalbuterol Tartrate Hfa), 45 MCG IH QID, (Reported)


Naproxen Sodium (Naproxen Sodium), 375 MG ORAL TWICE A DAY, (Reported)





Scheduled PRN


Tramadol Hcl* (Ultram*), 50 MG ORAL Q6H PRN for For Pain, (Reported)





Miscellaneous Medications


Blood Sugar Diagnostic (Freestyle Lite Test Strips), 1 EACH MC, (Reported)





Patient History


Healthcare decision maker





Resuscitation status


Full Code


Advanced Directive on File





Patient History Narrative


Pmhx: as above





SHx:  He does smoke approximately one pack every three days.  He


used to drink alcoholic beverages, but not anymore.  He uses medical


marijuana.





Fhx: non contributory





Review of Systems


All Other Systems:  negative except mentioned in HPI





Physical Exam


Physical Exam Narrative


General Appearance:  alert


Neck:  supple


Cardiovascular:  normal rate, regular rhythm


Respiratory/Chest:  decreased breath sounds, +wheezing, bibasilar crackles


Abdomen:  normal bowel sounds, non tender, soft


Extremities:  moderate edema b/l, pustular lesions on b/l leg from scracthing, 

some erythema surrounding, no discharge





Last 24 Hour Vital Signs








  Date Time  Temp Pulse Resp B/P (MAP) Pulse Ox O2 Delivery O2 Flow Rate FiO2


 


1/20/18 08:00 98.4 82 20 114/72 96   


 


1/20/18 07:53      Room Air  21


 


1/20/18 07:53  59 22   Room Air  21


 


1/20/18 07:53     98 Room Air  21


 


1/20/18 04:00  81      


 


1/20/18 04:00 97.0 76 20 116/77 96   


 


1/20/18 01:26  71 16  97 Facial  30


 


1/20/18 00:00 97.0 80 20 139/86 96   


 


1/20/18 00:00  85      


 


1/20/18 00:00   20  96 Room Air  


 


1/19/18 20:01  72 20  100 Room Air  


 


1/19/18 20:00  73      


 


1/19/18 20:00   20  90 Room Air  


 


1/19/18 20:00 97.7 78 20 123/84 90   


 


1/19/18 19:54      Nasal Cannula 2.0 28


 


1/19/18 19:51     97 Room Air  


 


1/19/18 19:50  75 20  97 Room Air  


 


1/19/18 19:50  75 18   Room Air  21


 


1/19/18 16:00 96.7 83 19 128/78 100 Nasal Cannula  


 


1/19/18 16:00  70      


 


1/19/18 12:30 97.0 75 19 106/76 95 Nasal Cannula  


 


1/19/18 12:00  83      

















Intake and Output  


 


 1/19/18 1/20/18





 19:00 07:00


 


Intake Total 972 ml 236 ml


 


Output Total 1100 ml 1200 ml


 


Balance -128 ml -964 ml


 


  


 


Intake Oral 772 ml 236 ml


 


IV Total 200 ml 


 


Output Urine Total 1100 ml 1200 ml











Laboratory Tests








Test


  1/20/18


06:30 1/20/18


10:25


 


White Blood Count


  7.5 K/UL


(4.8-10.8) 


 


 


Red Blood Count


  5.86 M/UL


(4.70-6.10) 


 


 


Hemoglobin


  15.8 G/DL


(14.2-18.0) 


 


 


Hematocrit


  52.1 %


(42.0-52.0)  H 


 


 


Mean Corpuscular Volume 89 FL (80-99)   


 


Mean Corpuscular Hemoglobin


  27.0 PG


(27.0-31.0) 


 


 


Mean Corpuscular Hemoglobin


Concent 30.4 G/DL


(32.0-36.0)  L 


 


 


Red Cell Distribution Width


  16.1 %


(11.6-14.8)  H 


 


 


Platelet Count


  160 K/UL


(150-450) 


 


 


Mean Platelet Volume


  7.8 FL


(6.5-10.1) 


 


 


Neutrophils (%) (Auto)


  72.9 %


(45.0-75.0) 


 


 


Lymphocytes (%) (Auto)


  11.1 %


(20.0-45.0)  L 


 


 


Monocytes (%) (Auto)


  14.6 %


(1.0-10.0)  H 


 


 


Eosinophils (%) (Auto)


  0.1 %


(0.0-3.0) 


 


 


Basophils (%) (Auto)


  1.4 %


(0.0-2.0) 


 


 


Sodium Level


  132 MMOL/L


(136-145)  L 


 


 


Potassium Level


  3.8 MMOL/L


(3.5-5.1) 


 


 


Chloride Level


  87 MMOL/L


()  L 


 


 


Carbon Dioxide Level


  41 MMOL/L


(21-32)  *H 


 


 


Anion Gap


  4 mmol/L


(5-15)  L 


 


 


Blood Urea Nitrogen


  12 mg/dL


(7-18) 


 


 


Creatinine


  0.7 MG/DL


(0.55-1.30) 


 


 


Estimat Glomerular Filtration


Rate > 60 mL/min


(>60) 


 


 


Glucose Level


  88 MG/DL


() 


 


 


Calcium Level


  7.1 MG/DL


(8.5-10.1)  L 


 


 


Phosphorus Level


  3.6 MG/DL


(2.5-4.9) 


 


 


Magnesium Level


  1.1 MG/DL


(1.8-2.4)  L 


 


 


Total Bilirubin


  1.5 MG/DL


(0.2-1.0)  H 


 


 


Direct Bilirubin


  0.6 MG/DL


(0.0-0.3)  H 


 


 


Aspartate Amino Transf


(AST/SGOT) 41 U/L (15-37)


H 


 


 


Alanine Aminotransferase


(ALT/SGPT) 94 U/L (12-78)


H 


 


 


Alkaline Phosphatase


  81 U/L


() 


 


 


Total Protein


  5.8 G/DL


(6.4-8.2)  L 


 


 


Albumin


  2.6 G/DL


(3.4-5.0)  L 


 


 


Globulin 3.2 g/dL   


 


Albumin/Globulin Ratio


  0.8 (1.0-2.7)


L 


 


 


Arterial Blood pH


  


  7.449


(7.350-7.450)


 


Arterial Blood Partial


Pressure CO2 


  62.4 mmHg


(35.0-45.0)  *H


 


Arterial Blood Partial


Pressure O2 


  54.5 mmHg


(75.0-100.0)  L


 


Arterial Blood HCO3


  


  54.5 mmol/L


(22.0-26.0)  H


 


Arterial Blood Oxygen


Saturation 


  88.0 %


(92.0-98.0)  L


 


Arterial Blood Base Excess  14.6  


 


Frandy Test  Positive  








Height (Feet):  6


Height (Inches):  2.00


Weight (Pounds):  244


Medications





Current Medications








 Medications


  (Trade)  Dose


 Ordered  Sig/German


 Route


 PRN Reason  Start Time


 Stop Time Status Last Admin


Dose Admin


 


 Acetaminophen


  (Tylenol)  650 mg  Q4H  PRN


 ORAL


 T>100.5  1/17/18 20:15


 2/16/18 00:14   


 


 


 Acetaminophen


  (Tylenol)  650 mg  Q4H  PRN


 ORAL


 Mild Pain/Temp > 100.5  1/18/18 22:15


 2/17/18 22:14  1/18/18 22:44


 


 


 Acetaminophen/


 Hydrocodone Bitart


  (Norco 5/325)  1 tab  Q6H  PRN


 ORAL


 For Pain Scale Level 4-10  1/18/18 20:00


 1/25/18 19:59   


 


 


 Albuterol/


 Ipratropium


  (Albuterol/


 Ipratropium)  3 ml  Q4H  PRN


 HHN


 Shortness of Breath  1/17/18 18:00


 1/22/18 17:59  1/19/18 19:53


 


 


 Cefazolin Sodium  50 ml @ 


 100 mls/hr  Q8H


 IV


   1/19/18 20:00


 1/26/18 23:59  1/20/18 04:50


 


 


 Clonazepam


  (KlonoPIN)  1 mg  DAILY  PRN


 ORAL


 For Anxiety  1/19/18 13:15


 1/26/18 13:14   


 


 


 Dextrose


  (Dextrose 50%)    STAT  PRN


 IV


 Hypoglycemia  1/17/18 18:00


 2/16/18 17:59   


 


 


 Escitalopram


 Oxalate


  (Lexapro)  10 mg  DAILY


 ORAL


   1/20/18 09:00


 2/19/18 08:59  1/20/18 09:18


 


 


 Furosemide


  (Lasix)  40 mg  Q12HR


 IV


   1/19/18 21:00


 2/18/18 20:59  1/20/18 09:20


 


 


 Heparin Sodium


  (Porcine)


  (Heparin 5000


 units/ml)  5,000 units  EVERY 12  HOURS


 SUBQ


   1/17/18 21:00


 2/16/18 08:59  1/20/18 09:19


 


 


 Insulin Aspart


  (NovoLOG)    BEFORE MEALS AND  HS


 SUBQ


   1/17/18 21:00


 2/16/18 06:29  1/19/18 21:14


 


 


 Ondansetron HCl


  (Zofran)  4 mg  Q6H  PRN


 IVP


 Nausea & Vomiting  1/17/18 18:15


 2/16/18 00:14   


 


 


 Polyethylene


 Glycol


  (Miralax)  17 gm  DAILYPRN  PRN


 ORAL


 Constipation  1/17/18 18:00


 2/16/18 17:59   


 


 


 Quetiapine


 Fumarate


  (SEROquel)  25 mg  Q4H  PRN


 ORAL


 agitation  1/17/18 18:00


 2/16/18 17:59   


 


 


 Quetiapine


 Fumarate


  (SEROquel)  100 mg  BEDTIME


 ORAL


   1/19/18 21:00


 2/18/18 20:59  1/19/18 21:08


 


 


 Sildenafil Citrate


  (Revatio)  10 mg  Q8HR


 ORAL


   1/19/18 18:30


 2/18/18 18:29  1/20/18 06:31


 


 


 Temazepam


  (Restoril)  15 mg  HSPRN  PRN


 ORAL


 Insomnia  1/17/18 21:00


 1/24/18 20:59   


 











Assessment/Plan


Assessment/Plan


Abx:


Ancef 1/19-





Assessment:





B/L leg pustular lesions- concerning for bacterial superinfection after skin 

trauma from scratching





afebrile, no leukocytosis


 -u/a neg





Cor pulmonale


  -CXR:  CHF/interstitial edema. Suspected right pleural effusion


Severe pulmonary HTN


Pulmonary edema/Anasarca s/p Bipap


Hypoxemia


 -v/q scan: Limited exam with central clumping of radiotracer, possibly related 

to COPD. Unable


to reliably assess for presence or absence of pulmonary emboli. Pulmonary 

embolism


protocol CT angiogram of the chest recommended.


 -v. duplex no DVT





Elevated LFTs, improving- 2ry to hepatic congestion





obesity 


Tobacco dependency


DM


MDD 


anxiety disorder 


CHF








Plan:


-Switch Ancef #2 to PO Bactrim DS 1 tab bid for 5 days


-leg elevation


-Monitor CBC/BMP, temperatures





Thank you for this consultation. Will continue to follow along with you.





Discussed with Kristi Perdomo M.D. Jan 20, 2018 11:06

## 2018-01-21 NOTE — PULMONOLOGY PROGRESS NOTE
Assessment/Plan


Assessment/Plan


ASSESSMENT


Cor pulmonale


acute hypoxemic hypercapnic respiratory failure -resolved 


Severe pulmonary HTN


acute pulmonary edema


Hypoxemia


Bronchospasm 


metabolic alkalosis 


CHF with pulmonary and hepatic congestion 


B/L leg pustular lesions- concerning for bacterial superinfection after skin 

trauma from scratching


anasarca


Morbid obesity 


Tobacco dependency


DM


MDD 


anxiety disorder  


e/lyte imbalance: hypo K, hypo Mg 





PLAN OF CARE 


tele


diuresis, monitor cardiorenal parameters, volumes


fup with CXR 


cardio follows 


IV diuresis, monitor volumes, cardiorenal parameters 


ECHO with EF 50% and RVSP of 75 with mild LV hypokinesis with abnormal septal 

motion.


Trial of Revatio


Started on Diamox for 2 days in hope to reset CO2, better today ,  


Need pulmonary HTN w/up as outpt and eventually R heart study


Venous Duplex LE negative 


VQ scan inconclusive 


will try CTA chest on Monday if no improvement clinically


Tox screen negative 


DVT prophylaxis


BS management with SS of insulin 


trend bili LFT


Monitor lytes, replace as needed 


psych follows, optimized psych med regimen    


ID follows, on Bactrim 


replace Mg and K





case discussed and evaluated by supervising physician





Subjective


Allergies:  


Coded Allergies:  


     No Known Allergies (Unverified , 1/16/18)


Subjective


denies chest pain, intermittent SOB





Objective





Last 24 Hour Vital Signs








  Date Time  Temp Pulse Resp B/P (MAP) Pulse Ox O2 Delivery O2 Flow Rate FiO2


 


1/21/18 08:00 96.8 79 18 119/74 100 Room Air  


 


1/21/18 08:00  81      


 


1/21/18 07:20     97 Nasal Cannula 2.0 28


 


1/21/18 07:20  74 20   Nasal Cannula 2.0 28


 


1/21/18 07:20      Nasal Cannula 2.0 28


 


1/21/18 04:31 96.4 83 18 132/82 96 Room Air  


 


1/21/18 04:00  92      


 


1/21/18 00:39 96.6 75 18 135/65  Nasal Cannula  


 


1/21/18 00:00  75      


 


1/20/18 20:40 96.3 72 18 121/76 95 Nasal Cannula  


 


1/20/18 20:00  72      


 


1/20/18 19:18     96 Nasal Cannula 2.0 21


 


1/20/18 19:18      Nasal Cannula 2.0 28


 


1/20/18 19:17  75 20   Room Air 2.0 21


 


1/20/18 16:00  75      


 


1/20/18 16:00 98.2 99 20 114/68 92   

















Intake and Output  


 


 1/20/18 1/21/18





 19:00 07:00


 


Intake Total 1080 ml 


 


Output Total 800 ml 1000 ml


 


Balance 280 ml -1000 ml


 


  


 


Intake Oral 1080 ml 


 


Output Urine Total 800 ml 1000 ml


 


# Voids 2 








Objective


General Appearance:  no acute distress, other - A/A/O x 3 obese  male


HEENT:  normocephalic, atraumatic, anicteric


Respiratory/Chest:  lungs clear, no respiratory distress, no accessory muscle 

use


Cardiovascular:  normal peripheral pulses, normal rate, regular rhythm - SR on 

tele


Abdomen:  normal bowel sounds, soft, non tender - obese


Extremities:  other - +2 edema BLE


Skin:  other - BLE pustular lesions with surrounding erythema, no drainage


Neurologic/Psychiatric:  no motor/sensory deficits, alert, oriented x 3, 

responsive


Musculoskeletal:  normal muscle bulk


Laboratory Tests


1/21/18 06:01: 


White Blood Count 7.3, Red Blood Count 6.09, Hemoglobin 16.2, Hematocrit 53.6H, 

Mean Corpuscular Volume 88, Mean Corpuscular Hemoglobin 26.6L, Mean Corpuscular 

Hemoglobin Concent 30.2L, Red Cell Distribution Width 16.0H, Platelet Count 161

, Mean Platelet Volume 6.6, Neutrophils (%) (Auto) 77.7H, Lymphocytes (%) (Auto

) 8.6L, Monocytes (%) (Auto) 12.3H, Eosinophils (%) (Auto) 0.3, Basophils (%) (

Auto) 1.1, Sodium Level 133L, Potassium Level 3.4L, Chloride Level 91L, Carbon 

Dioxide Level 37H, Anion Gap 5, Blood Urea Nitrogen 10, Creatinine 0.7, Estimat 

Glomerular Filtration Rate > 60, Glucose Level 97, Calcium Level 8.4L, 

Magnesium Level 1.4L





Current Medications








 Medications


  (Trade)  Dose


 Ordered  Sig/German


 Route


 PRN Reason  Start Time


 Stop Time Status Last Admin


Dose Admin


 


 Acetaminophen


  (Tylenol)  650 mg  Q4H  PRN


 ORAL


 T>100.5  1/17/18 20:15


 2/16/18 00:14   


 


 


 Acetaminophen


  (Tylenol)  650 mg  Q4H  PRN


 ORAL


 Mild Pain/Temp > 100.5  1/18/18 22:15


 2/17/18 22:14  1/18/18 22:44


 


 


 Acetaminophen/


 Hydrocodone Bitart


  (Norco 5/325)  1 tab  Q6H  PRN


 ORAL


 For Pain Scale Level 4-10  1/18/18 20:00


 1/25/18 19:59   


 


 


 Acetazolamide


  (Diamox)  250 mg  TWICE A  DAY


 ORAL


   1/20/18 18:00


 2/19/18 17:59  1/21/18 08:35


 


 


 Albuterol/


 Ipratropium


  (Albuterol/


 Ipratropium)  3 ml  Q4H  PRN


 HHN


 Shortness of Breath  1/17/18 18:00


 1/22/18 17:59  1/19/18 19:53


 


 


 Clonazepam


  (KlonoPIN)  1 mg  DAILY  PRN


 ORAL


 For Anxiety  1/19/18 13:15


 1/26/18 13:14   


 


 


 Dextrose


  (Dextrose 50%)    STAT  PRN


 IV


 Hypoglycemia  1/17/18 18:00


 2/16/18 17:59   


 


 


 Escitalopram


 Oxalate


  (Lexapro)  10 mg  DAILY


 ORAL


   1/20/18 09:00


 2/19/18 08:59  1/21/18 08:35


 


 


 Furosemide


  (Lasix)  40 mg  Q12HR


 IV


   1/19/18 21:00


 2/18/18 20:59  1/21/18 08:42


 


 


 Heparin Sodium


  (Porcine)


  (Heparin 5000


 units/ml)  5,000 units  EVERY 12  HOURS


 SUBQ


   1/17/18 21:00


 2/16/18 08:59  1/21/18 08:37


 


 


 Insulin Aspart


  (NovoLOG)    BEFORE MEALS AND  HS


 SUBQ


   1/17/18 21:00


 2/16/18 06:29  1/20/18 21:05


 


 


 Ondansetron HCl


  (Zofran)  4 mg  Q6H  PRN


 IVP


 Nausea & Vomiting  1/17/18 18:15


 2/16/18 00:14   


 


 


 Polyethylene


 Glycol


  (Miralax)  17 gm  DAILYPRN  PRN


 ORAL


 Constipation  1/17/18 18:00


 2/16/18 17:59   


 


 


 Quetiapine


 Fumarate


  (SEROquel)  25 mg  Q4H  PRN


 ORAL


 agitation  1/17/18 18:00


 2/16/18 17:59   


 


 


 Quetiapine


 Fumarate


  (SEROquel)  100 mg  BEDTIME


 ORAL


   1/19/18 21:00


 2/18/18 20:59  1/20/18 20:57


 


 


 Sildenafil Citrate


  (Revatio)  10 mg  Q8HR


 ORAL


   1/19/18 18:30


 2/18/18 18:29  1/21/18 06:08


 


 


 Temazepam


  (Restoril)  15 mg  HSPRN  PRN


 ORAL


 Insomnia  1/17/18 21:00


 1/24/18 20:59   


 


 


 Trimethoprim/


 Sulfamethoxazole


  (Bactrim-DS)  1 tab  Q12HR


 ORAL


   1/20/18 13:00


 1/27/18 12:59  1/21/18 08:43


 

















Raj ([facility])Monica NP Jan 21, 2018 13:40

## 2018-01-21 NOTE — CARDIOLOGY PROGRESS NOTE
Assessment/Plan


Problem List:  


(1) Anasarca


(2) CHF (congestive heart failure)


(3) Hypoxia


(4) Cor pulmonale


Status:  stable, progressing


Status Narrative


Mr. Hu has cor pulmonale, w/ dyspnea, pulmonary and  hepatic congestion and LE 

edema. 


He is on lasix and diamox. Met alkalosis is improving. He is hypokalemic


No evidence for AMI/ acute coronary syndrome


Assessment/Plan


Continue diuresis w/ diamox and lasix. Will change lasix to po.


Revatio for pulm hypertension


supplement K


Eventual rt/lt heart catheterization -can be done as outpt


Will dc telemetry, as rhythm stable.





Subjective


ROS Limited/Unobtainable:  No


Subjective


Cardiology for Dr. Cosby





No c/o dyspnea. Anxious for dc from hospital





Objective





Last 24 Hour Vital Signs








  Date Time  Temp Pulse Resp B/P (MAP) Pulse Ox O2 Delivery O2 Flow Rate FiO2


 


1/21/18 08:00 96.8 79 18 119/74 100 Room Air  


 


1/21/18 08:00  81      


 


1/21/18 07:20     97 Nasal Cannula 2.0 28


 


1/21/18 07:20  74 20   Nasal Cannula 2.0 28


 


1/21/18 07:20      Nasal Cannula 2.0 28


 


1/21/18 04:31 96.4 83 18 132/82 96 Room Air  


 


1/21/18 04:00  92      


 


1/21/18 00:39 96.6 75 18 135/65  Nasal Cannula  


 


1/21/18 00:00  75      


 


1/20/18 20:40 96.3 72 18 121/76 95 Nasal Cannula  


 


1/20/18 20:00  72      


 


1/20/18 19:18     96 Nasal Cannula 2.0 21


 


1/20/18 19:18      Nasal Cannula 2.0 28


 


1/20/18 19:17  75 20   Room Air 2.0 21


 


1/20/18 16:00  75      


 


1/20/18 16:00 98.2 99 20 114/68 92   








General Appearance:  WD/WN, no apparent distress, alert


EENT:  PERRL/EOMI


Neck:  supple, no JVD


Rhythm:  NSR


Cardiovascular:  normal rate, regular rhythm, regularly irregular, no gallop/

murmur


Respiratory/Chest:  lungs clear


Abdomen:  non tender, soft


Extremities:  other - 2+ edema of LEs . R LE > L LE erythema, and superficial 

ulcerations.











Intake and Output  


 


 1/20/18 1/21/18





 19:00 07:00


 


Intake Total 1080 ml 


 


Output Total 800 ml 1000 ml


 


Balance 280 ml -1000 ml


 


  


 


Intake Oral 1080 ml 


 


Output Urine Total 800 ml 1000 ml


 


# Voids 2 











Laboratory Tests








Test


  1/21/18


06:01


 


White Blood Count


  7.3 K/UL


(4.8-10.8)


 


Red Blood Count


  6.09 M/UL


(4.70-6.10)


 


Hemoglobin


  16.2 G/DL


(14.2-18.0)


 


Hematocrit


  53.6 %


(42.0-52.0)  H


 


Mean Corpuscular Volume 88 FL (80-99)  


 


Mean Corpuscular Hemoglobin


  26.6 PG


(27.0-31.0)  L


 


Mean Corpuscular Hemoglobin


Concent 30.2 G/DL


(32.0-36.0)  L


 


Red Cell Distribution Width


  16.0 %


(11.6-14.8)  H


 


Platelet Count


  161 K/UL


(150-450)


 


Mean Platelet Volume


  6.6 FL


(6.5-10.1)


 


Neutrophils (%) (Auto)


  77.7 %


(45.0-75.0)  H


 


Lymphocytes (%) (Auto)


  8.6 %


(20.0-45.0)  L


 


Monocytes (%) (Auto)


  12.3 %


(1.0-10.0)  H


 


Eosinophils (%) (Auto)


  0.3 %


(0.0-3.0)


 


Basophils (%) (Auto)


  1.1 %


(0.0-2.0)


 


Sodium Level


  133 MMOL/L


(136-145)  L


 


Potassium Level


  3.4 MMOL/L


(3.5-5.1)  L


 


Chloride Level


  91 MMOL/L


()  L


 


Carbon Dioxide Level


  37 MMOL/L


(21-32)  H


 


Anion Gap


  5 mmol/L


(5-15)


 


Blood Urea Nitrogen


  10 mg/dL


(7-18)


 


Creatinine


  0.7 MG/DL


(0.55-1.30)


 


Estimat Glomerular Filtration


Rate > 60 mL/min


(>60)


 


Glucose Level


  97 MG/DL


()


 


Calcium Level


  8.4 MG/DL


(8.5-10.1)  L


 


Magnesium Level


  1.4 MG/DL


(1.8-2.4)  NIDHI GARIBAY Jan 21, 2018 14:10

## 2018-01-22 NOTE — DIAGNOSTIC IMAGING REPORT
Indication: First of breath

 

Technique: CT pulmonary angiogram performed utilizing automated exposure control with

intravenous contrast. Axial, sagittal and coronal reconstructions were obtained. 3-D

volumetric reconstructions were also performed.

 

CT dose: Total DLP 1007.87 mGycm; CTDI vol 25.89 mGy

 

Comparison: Correlation made to prior nuclear medicine VQ scan and prior chest

radiographs

 

Findings: 

There is no pulmonary embolism. The main pulmonary artery is enlarged, measuring up

to 3.5 cm in diameter. Thoracic aorta is normal in caliber. No evidence to suggest

aortic dissection there is conventional branching anatomy of the great vessels, the

proximal portions of which are patent. The SVC is patent. There is some reflux of

contrast into the IVC, possibly suggestion of a degree of right heart failure.

 

The heart is enlarged. There is enlargement of the right ventricle. There is mild

pericardial effusion. Small mediastinal lymph nodes are noted, none of which meet

size criteria for pathologic enlargement. The thyroid is unremarkable in appearance.

 

Centrilobular emphysema noted bilaterally, most pronounced in the bilateral upper

lobes. There is a small right pleural effusion with dense atelectasis or

consolidation in the right lower lobe. A trace left pleural effusion is also noted

with adjacent compressive atelectasis of the left lower lobe. There is no

pneumothorax. Imaged portions of the upper abdomen are grossly unremarkable.

 

IMPRESSION: 

*  No pulmonary embolism.

*  No thoracic aortic aneurysm. 

*  Enlarged pulmonary arteries likely reflective of pulmonary arterial hypertension.

*  Reflux of contrast into the IVC suggestive of degree of right heart failure. The

right ventricle appears dilated.

*  Small pericardial effusion.

*  Centrilobular emphysema.

*  Small right and trace left pleural effusions with adjacent

atelectasis/consolidation in the bilateral lower lobes.

 

The CT scanner at [facility] is accredited by the [employer] College of

Radiology and the scans are performed using protocols designed to limit radiation

exposure to as low as reasonably achievable to attain images of sufficient resolution

adequate for diagnostic evaluation.

## 2018-01-22 NOTE — GENERAL PROGRESS NOTE
Assessment/Plan


Status:  stable, progressing





Subjective


Date patient seen:  Jan 21, 2018


Neurologic/Psychiatric:  Reports: anxiety, depressed, emotional problems


Allergies:  


Coded Allergies:  


     No Known Allergies (Unverified , 1/16/18)





Objective





Last 24 Hour Vital Signs








  Date Time  Temp Pulse Resp B/P (MAP) Pulse Ox O2 Delivery O2 Flow Rate FiO2


 


1/22/18 12:00 96.9 81 19 120/64 98 Nasal Cannula 2.0 


 


1/22/18 12:00  73      


 


1/22/18 08:00 97.4 78 19 100/78 95 Nasal Cannula 2.0 


 


1/22/18 08:00  79      


 


1/22/18 07:45     95 Nasal Cannula 2.0 28


 


1/22/18 07:45  75 18   Room Air  21


 


1/22/18 07:45      Nasal Cannula 2.0 28


 


1/22/18 04:00  74      


 


1/22/18 04:00 97.2 75 24 121/68 95 Nasal Cannula 2.0 


 


1/22/18 00:00  75      


 


1/22/18 00:00 97.9 74 20 105/89 95 Nasal Cannula 2.0 

















Intake and Output  


 


 1/21/18 1/22/18





 19:00 07:00


 


Intake Total 472 ml 536 ml


 


Output Total 750 ml 1500 ml


 


Balance -278 ml -964 ml


 


  


 


Intake Oral 472 ml 536 ml


 


Output Urine Total 750 ml 1500 ml








Laboratory Tests


1/22/18 04:00: 


Sodium Level 131L, Potassium Level 4.1, Chloride Level 96L, Carbon Dioxide 

Level 33H, Anion Gap 2L, Blood Urea Nitrogen 10, Creatinine 0.6, Estimat 

Glomerular Filtration Rate > 60, Glucose Level 105, Calcium Level 8.4L


1/22/18 05:50: 


White Blood Count 7.7, Red Blood Count 5.93, Hemoglobin 15.7, Hematocrit 52.2H, 

Mean Corpuscular Volume 88, Mean Corpuscular Hemoglobin 26.4L, Mean Corpuscular 

Hemoglobin Concent 30.0L, Red Cell Distribution Width 16.1H, Platelet Count 168

, Mean Platelet Volume 7.3, Neutrophils (%) (Auto) 77.3H, Lymphocytes (%) (Auto

) 7.7L, Monocytes (%) (Auto) 13.7H, Eosinophils (%) (Auto) 0.4, Basophils (%) (

Auto) 0.9


Height (Feet):  6


Height (Inches):  2.00


Weight (Pounds):  230


General Appearance:  no apparent distress, alert


Neurologic:  alert, oriented x 3, responsive, depressed affect











Leland Harris M.D. Jan 22, 2018 22:51

## 2018-01-22 NOTE — GENERAL PROGRESS NOTE
Assessment/Plan


Status:  stable, progressing





Subjective


Date patient seen:  Jan 22, 2018


Neurologic/Psychiatric:  Reports: anxiety, depressed, emotional problems


Allergies:  


Coded Allergies:  


     No Known Allergies (Unverified , 1/16/18)





Objective





Last 24 Hour Vital Signs








  Date Time  Temp Pulse Resp B/P (MAP) Pulse Ox O2 Delivery O2 Flow Rate FiO2


 


1/22/18 12:00 96.9 81 19 120/64 98 Nasal Cannula 2.0 


 


1/22/18 12:00  73      


 


1/22/18 08:00 97.4 78 19 100/78 95 Nasal Cannula 2.0 


 


1/22/18 08:00  79      


 


1/22/18 07:45     95 Nasal Cannula 2.0 28


 


1/22/18 07:45  75 18   Room Air  21


 


1/22/18 07:45      Nasal Cannula 2.0 28


 


1/22/18 04:00  74      


 


1/22/18 04:00 97.2 75 24 121/68 95 Nasal Cannula 2.0 


 


1/22/18 00:00  75      


 


1/22/18 00:00 97.9 74 20 105/89 95 Nasal Cannula 2.0 

















Intake and Output  


 


 1/21/18 1/22/18





 19:00 07:00


 


Intake Total 472 ml 536 ml


 


Output Total 750 ml 1500 ml


 


Balance -278 ml -964 ml


 


  


 


Intake Oral 472 ml 536 ml


 


Output Urine Total 750 ml 1500 ml








Laboratory Tests


1/22/18 04:00: 


Sodium Level 131L, Potassium Level 4.1, Chloride Level 96L, Carbon Dioxide 

Level 33H, Anion Gap 2L, Blood Urea Nitrogen 10, Creatinine 0.6, Estimat 

Glomerular Filtration Rate > 60, Glucose Level 105, Calcium Level 8.4L


1/22/18 05:50: 


White Blood Count 7.7, Red Blood Count 5.93, Hemoglobin 15.7, Hematocrit 52.2H, 

Mean Corpuscular Volume 88, Mean Corpuscular Hemoglobin 26.4L, Mean Corpuscular 

Hemoglobin Concent 30.0L, Red Cell Distribution Width 16.1H, Platelet Count 168

, Mean Platelet Volume 7.3, Neutrophils (%) (Auto) 77.3H, Lymphocytes (%) (Auto

) 7.7L, Monocytes (%) (Auto) 13.7H, Eosinophils (%) (Auto) 0.4, Basophils (%) (

Auto) 0.9


Height (Feet):  6


Height (Inches):  2.00


Weight (Pounds):  230


General Appearance:  no apparent distress, alert


Neurologic:  alert, oriented x 3, responsive, depressed affect











Leland Harris M.D. Jan 22, 2018 22:50

## 2018-01-22 NOTE — PSYCH CONSULT PROGRESS NOTE
Psych Consult Progress Note


Consult


1/20/18


Vital Signs





Last 24 Hour Vital Signs








  Date Time  Temp Pulse Resp B/P (MAP) Pulse Ox O2 Delivery O2 Flow Rate FiO2


 


1/22/18 12:00 96.9 81 19 120/64 98 Nasal Cannula 2.0 


 


1/22/18 12:00  73      


 


1/22/18 08:00 97.4 78 19 100/78 95 Nasal Cannula 2.0 


 


1/22/18 08:00  79      


 


1/22/18 07:45     95 Nasal Cannula 2.0 28


 


1/22/18 07:45  75 18   Room Air  21


 


1/22/18 07:45      Nasal Cannula 2.0 28


 


1/22/18 04:00  74      


 


1/22/18 04:00 97.2 75 24 121/68 95 Nasal Cannula 2.0 


 


1/22/18 00:00  75      


 


1/22/18 00:00 97.9 74 20 105/89 95 Nasal Cannula 2.0 








Labs





Laboratory Tests








Test


  1/22/18


04:00 1/22/18


05:50


 


Sodium Level


  131 MMOL/L


(136-145)  L 


 


 


Potassium Level


  4.1 MMOL/L


(3.5-5.1) 


 


 


Chloride Level


  96 MMOL/L


()  L 


 


 


Carbon Dioxide Level


  33 MMOL/L


(21-32)  H 


 


 


Anion Gap


  2 mmol/L


(5-15)  L 


 


 


Blood Urea Nitrogen


  10 mg/dL


(7-18) 


 


 


Creatinine


  0.6 MG/DL


(0.55-1.30) 


 


 


Estimat Glomerular Filtration


Rate > 60 mL/min


(>60) 


 


 


Glucose Level


  105 MG/DL


() 


 


 


Calcium Level


  8.4 MG/DL


(8.5-10.1)  L 


 


 


White Blood Count


  


  7.7 K/UL


(4.8-10.8)


 


Red Blood Count


  


  5.93 M/UL


(4.70-6.10)


 


Hemoglobin


  


  15.7 G/DL


(14.2-18.0)


 


Hematocrit


  


  52.2 %


(42.0-52.0)  H


 


Mean Corpuscular Volume  88 FL (80-99)  


 


Mean Corpuscular Hemoglobin


  


  26.4 PG


(27.0-31.0)  L


 


Mean Corpuscular Hemoglobin


Concent 


  30.0 G/DL


(32.0-36.0)  L


 


Red Cell Distribution Width


  


  16.1 %


(11.6-14.8)  H


 


Platelet Count


  


  168 K/UL


(150-450)


 


Mean Platelet Volume


  


  7.3 FL


(6.5-10.1)


 


Neutrophils (%) (Auto)


  


  77.3 %


(45.0-75.0)  H


 


Lymphocytes (%) (Auto)


  


  7.7 %


(20.0-45.0)  L


 


Monocytes (%) (Auto)


  


  13.7 %


(1.0-10.0)  H


 


Eosinophils (%) (Auto)


  


  0.4 %


(0.0-3.0)


 


Basophils (%) (Auto)


  


  0.9 %


(0.0-2.0)








Problems:  


(1) Diabetes mellitus


(2) Pulmonary edema











Leland Harris M.D. Jan 22, 2018 22:51

## 2018-01-22 NOTE — INFECTIOUS DISEASES PROG NOTE
Assessment/Plan


Assessment/Plan





Assessment:





B/L leg pustular lesions- concerning for bacterial superinfection after skin 

trauma from scratching





afebrile, no leukocytosis


 -u/a neg





Cor pulmonale


  -CXR:  CHF/interstitial edema. Suspected right pleural effusion


Severe pulmonary HTN


Pulmonary edema/Anasarca s/p Bipap


Hypoxemia


 -v/q scan: Limited exam with central clumping of radiotracer, possibly related 

to COPD. Unable


to reliably assess for presence or absence of pulmonary emboli. Pulmonary 

embolism


protocol CT angiogram of the chest recommended.


 -v. duplex no DVT





Elevated LFTs, improving- 2ry to hepatic congestion





obesity 


Tobacco dependency


DM


MDD 


anxiety disorder 


CHF








Plan:


- cont  Bactrim DS 1 tab bid for d# 3 /  5 d


  1/20 SP Switch Ancef #2 


-leg elevation


-Monitor CBC/BMP, temperatures





Subjective


Constitutional:  Denies: no symptoms, fever, chills, fatigue, anorexia, 

drenching sweats, other


Allergies:  


Coded Allergies:  


     No Known Allergies (Unverified , 1/16/18)





Objective


Vital Signs





Last 24 Hour Vital Signs








  Date Time  Temp Pulse Resp B/P (MAP) Pulse Ox O2 Delivery O2 Flow Rate FiO2


 


1/22/18 07:45     95 Nasal Cannula 2.0 28


 


1/22/18 07:45  75 18   Room Air  21


 


1/22/18 07:45      Nasal Cannula 2.0 28


 


1/22/18 04:00  74      


 


1/22/18 04:00 97.2 75 24 121/68 95 Nasal Cannula 2.0 


 


1/22/18 00:00  75      


 


1/22/18 00:00 97.9 74 20 105/89 95 Nasal Cannula 2.0 


 


1/21/18 20:00  72      


 


1/21/18 20:00 97.4  22 120/69 95 Nasal Cannula  


 


1/21/18 19:08     96 Nasal Cannula 2.0 21


 


1/21/18 19:08      Nasal Cannula 2.0 28


 


1/21/18 19:07  81 18   Room Air  21


 


1/21/18 16:00 96.8  18 122/76 97 Nasal Cannula  


 


1/21/18 16:00  72      








Height (Feet):  6


Height (Inches):  2.00


Weight (Pounds):  230


HEENT:  mucous membranes moist


Respiratory/Chest:  normal breath sounds


Cardiovascular:  regular rhythm


Abdomen:  no mass





Laboratory Tests








Test


  1/22/18


04:00 1/22/18


05:50


 


Sodium Level


  131 MMOL/L


(136-145)  L 


 


 


Potassium Level


  4.1 MMOL/L


(3.5-5.1) 


 


 


Chloride Level


  96 MMOL/L


()  L 


 


 


Carbon Dioxide Level


  33 MMOL/L


(21-32)  H 


 


 


Anion Gap


  2 mmol/L


(5-15)  L 


 


 


Blood Urea Nitrogen


  10 mg/dL


(7-18) 


 


 


Creatinine


  0.6 MG/DL


(0.55-1.30) 


 


 


Estimat Glomerular Filtration


Rate > 60 mL/min


(>60) 


 


 


Glucose Level


  105 MG/DL


() 


 


 


Calcium Level


  8.4 MG/DL


(8.5-10.1)  L 


 


 


White Blood Count


  


  7.7 K/UL


(4.8-10.8)


 


Red Blood Count


  


  5.93 M/UL


(4.70-6.10)


 


Hemoglobin


  


  15.7 G/DL


(14.2-18.0)


 


Hematocrit


  


  52.2 %


(42.0-52.0)  H


 


Mean Corpuscular Volume  88 FL (80-99)  


 


Mean Corpuscular Hemoglobin


  


  26.4 PG


(27.0-31.0)  L


 


Mean Corpuscular Hemoglobin


Concent 


  30.0 G/DL


(32.0-36.0)  L


 


Red Cell Distribution Width


  


  16.1 %


(11.6-14.8)  H


 


Platelet Count


  


  168 K/UL


(150-450)


 


Mean Platelet Volume


  


  7.3 FL


(6.5-10.1)


 


Neutrophils (%) (Auto)


  


  77.3 %


(45.0-75.0)  H


 


Lymphocytes (%) (Auto)


  


  7.7 %


(20.0-45.0)  L


 


Monocytes (%) (Auto)


  


  13.7 %


(1.0-10.0)  H


 


Eosinophils (%) (Auto)


  


  0.4 %


(0.0-3.0)


 


Basophils (%) (Auto)


  


  0.9 %


(0.0-2.0)











Current Medications








 Medications


  (Trade)  Dose


 Ordered  Sig/German


 Route


 PRN Reason  Start Time


 Stop Time Status Last Admin


Dose Admin


 


 Acetaminophen


  (Tylenol)  650 mg  Q4H  PRN


 ORAL


 T>100.5  1/17/18 20:15


 2/16/18 00:14   


 


 


 Acetaminophen


  (Tylenol)  650 mg  Q4H  PRN


 ORAL


 Mild Pain/Temp > 100.5  1/18/18 22:15


 2/17/18 22:14  1/18/18 22:44


 


 


 Acetaminophen/


 Hydrocodone Bitart


  (Norco 5/325)  1 tab  Q6H  PRN


 ORAL


 For Pain Scale Level 4-10  1/18/18 20:00


 1/25/18 19:59   


 


 


 Acetazolamide


  (Diamox)  250 mg  TWICE A  DAY


 ORAL


   1/20/18 18:00


 2/19/18 17:59  1/22/18 09:23


 


 


 Albuterol/


 Ipratropium


  (Albuterol/


 Ipratropium)  3 ml  Q4H  PRN


 HHN


 Shortness of Breath  1/21/18 14:00


 1/26/18 23:59   


 


 


 Clonazepam


  (KlonoPIN)  1 mg  DAILY  PRN


 ORAL


 For Anxiety  1/19/18 13:15


 1/26/18 13:14   


 


 


 Dextrose


  (Dextrose 50%)    STAT  PRN


 IV


 Hypoglycemia  1/17/18 18:00


 2/16/18 17:59   


 


 


 Escitalopram


 Oxalate


  (Lexapro)  10 mg  DAILY


 ORAL


   1/20/18 09:00


 2/19/18 08:59  1/22/18 09:23


 


 


 Furosemide


  (Lasix)  40 mg  DAILY


 ORAL


   1/22/18 09:00


 2/21/18 08:59  1/22/18 09:23


 


 


 Heparin Sodium


  (Porcine)


  (Heparin 5000


 units/ml)  5,000 units  EVERY 12  HOURS


 SUBQ


   1/17/18 21:00


 2/16/18 08:59  1/22/18 09:34


 


 


 Insulin Aspart


  (NovoLOG)    BEFORE MEALS AND  HS


 SUBQ


   1/17/18 21:00


 2/16/18 06:29  1/22/18 06:41


 


 


 Ondansetron HCl


  (Zofran)  4 mg  Q6H  PRN


 IVP


 Nausea & Vomiting  1/17/18 18:15


 2/16/18 00:14   


 


 


 Polyethylene


 Glycol


  (Miralax)  17 gm  DAILYPRN  PRN


 ORAL


 Constipation  1/17/18 18:00


 2/16/18 17:59   


 


 


 Quetiapine


 Fumarate


  (SEROquel)  25 mg  Q4H  PRN


 ORAL


 agitation  1/17/18 18:00


 2/16/18 17:59   


 


 


 Quetiapine


 Fumarate


  (SEROquel)  100 mg  BEDTIME


 ORAL


   1/19/18 21:00


 2/18/18 20:59  1/21/18 20:50


 


 


 Sildenafil Citrate


  (Revatio)  10 mg  Q8HR


 ORAL


   1/19/18 18:30


 2/18/18 18:29  1/22/18 06:27


 


 


 Temazepam


  (Restoril)  15 mg  HSPRN  PRN


 ORAL


 Insomnia  1/17/18 21:00


 1/24/18 20:59   


 


 


 Trimethoprim/


 Sulfamethoxazole


  (Bactrim-DS)  1 tab  Q12HR


 ORAL


   1/20/18 13:00


 1/27/18 12:59  1/22/18 09:23


 

















IZABELA SALMON M.D. Jan 22, 2018 12:03

## 2018-01-23 NOTE — DISCHARGE SUMMARY
Discharge Summary


Hospital Course


Date of Admission


Jan 16, 2018 at 22:38


Date of Discharge


Jan 22, 2018 at 16:35


Admitting Diagnosis


CHF


HPI


Wang Hu is a 47 year old male who was admitted on Jan 16, 2018 at 22:38 

for Congestive Heart Failure


Hospital Course


dc summary #[id number]





Discharge Medications


New Medications:  


Furosemide* (Lasix*) 40 Mg Tablet


20 MG ORAL DAILY for 30 Days, TAB





Quetiapine Fumarate* (Seroquel*) 25 Mg Tablet


25 MG ORAL Q4H PRN for 30 Days, TAB





Quetiapine Fumarate* (Seroquel*) 100 Mg Tablet


100 MG ORAL BEDTIME for 30 Days, TAB





Trimethoprim/Sulfamethoxazole (Bactrim Ds Tablet) 1 Each Tablet


1 TAB ORAL Q12HR for 5 Days, TAB





 


Continued Medications:  


Levalbuterol Tartrate (Levalbuterol Tartrate Hfa) 15 Gm Hfa.aer.ad


45 MCG IH QID for Shortness of Breath











Discharge


Discharge Disposition


Patient was discharged to [facility] (01)


Discharge Diagnoses:  











Bravo (Timothy)Monica NP Jan 23, 2018 10:50

## 2018-01-23 NOTE — DISCHARGE SUMMARY 2 SIG
DATE OF ADMISSION:  01/16/2018



DATE OF DISCHARGE:  01/22/2018



REASON FOR ADMISSION:  47-year-old male, residing in [facility], 

with past medical history of hypertension, asthma, and diabetes, 

presented to the emergency department with edema and dyspnea.  

Pulse oximetry on room air was 70%.  He somewhat improved with supplemental

oxygen and albuterol via nebulizing treatment.  The patient was diagnosed

with CHF, pulmonary edema, cor pulmonale, hypoxia, anasarca, hepatic

congestion, diabetes, and transferred to telemetry floor for further

management.



HOSPITAL COURSE:  The patient was admitted to telemetry floor.  Cardiology

consult initially was requested.  The patient started on diuresis.  Renal

parameters, electrolytes, intake and output were closely monitored.

Echocardiogram revealed preserved ejection fraction of 60% and right

ventricular systolic pressure of 75 consistent with severe pulmonary

hypertension.  There was significant right ventricular and right atrial

enlargement and IVC dilatation.  Venous duplex of bilateral lower

extremities  revealed no evidence of acute DVT.  Per Cardiology, the patient

had cor pulmonale, etiology likely related to lung injury and obesity.

No evidence of acute myocardial injury. 

The patient started on treatment for severe pulmonary hypertension with a

trial of Revatio.  Supplemental oxygen and pulmonary toilet provided to

keep pulse oximetry above 92%.  V/Q scan initially was done to rule out PE

and was nonconclusive.  Subsequently, CTA of the chest was done and

revealed no evidence of PE.  Urine toxicology screen was negative.  The

patient was on diuresis with Lasix and Diamox. Patient  initially with evidence 

of metabolic alkalosis, but Diamox reset CO2. it was trending down.  DVT 

prophylaxis provided.  Blood sugar was managed with sliding scale of insulin.  

LFT trending down.  Elevated bilirubin and LFT were likely secondary to hepatic 

congestion, all of them trending down.  Electrolytes were closely monitored and 

replaced as needed.  ID followed for pustular lesions on bilateral lower 
extremities. 

Patient initially was on IV antibiotic and  then subsequently  was changed

to oral.  No leukocytosis, no fever.  There was a concern for bacterial 
superinfection

after skin trauma from scratching, and ID recommended to continue oral 
antibiotics 

at the [facility]. to complete the course.  Psychiatrist followed.  
Psychiatrist

diagnosed patent with major depression disorder, anxiety disorder.  Psychiatric

medication regimen was optimized. The patient was counseled on abstinence from 

smoking.  The patient declined nicotine patch. 

Patient will eventually need right and left heart catheterization;  patient was 
informed

about it. 

The patient was stable for discharge home.





FINAL DIAGNOSES:

1. Cor pulmonale.

2. Acute hypoxemic hypercapnic respiratory failure -resolved 

3. Severe pulmonary hypertension.

4. Acute pulmonary edema.

5. Congestive heart failure with pulmonary and hepatic congestion.

6. Bronchospasm.

7. Metabolic alkalosis-resolved.

8. Bilateral leg pustular lesions (with concern of bacterial superinfection 

    after skin trauma from scratching)

9. Anasarca.

10. Morbid obesity.

11. Tobacco dependency.

12. Diabetes.

13. Major depression disorder.

14. Anxiety disorder.

15. Electrolyte abnormalities:  hypomagnesemia, hypokalemia.



DISCHARGE MEDICATIONS:  See medication reconciliation list.



DISCHARGE INSTRUCTIONS:  The patient discharged to board and care.  Follow

up with primary medical doctor. Patient will need referral to cardiologist for 
right 

and left cardiac catheterization and close follow up. 







  ______________________________________________

  Harrison Plummer M.D.



 

  ______________________________________________

  [facility] system[facility] Milian





DR:  SAUL

D:  01/23/2018 10:50

T:  01/23/2018 21:54

JOB#:  [id number]

CC:



ROLAND

## 2018-01-28 NOTE — DIAGNOSTIC IMAGING REPORT
--------------- APPROVED REPORT --------------





CPT Code: 27869



Present Symptoms

Lower Extremity Edema: Bilateral  

Comments: R/O DVT.





Technically difficult study due to vessel depth (mid to distal thigh).



BILATERAL LOWER EXTREMITY VENOUS DUPLEX: Imaging reveals a patent deep venous system 

bilaterally. There is no evidence of thrombus within the femoral, popliteal or tibial 

segments. The greater saphenous veins are also within normal limits. Doppler indicates 

normal spontaneous flow within these segments. The mid to distal superficial femoral 

veins were not well visualized bilaterally.

## 2019-02-09 NOTE — NUR
ED Nurse Note:



A/OX4. BROUGHT IN BY [facility] STAFF IN TO ER DUE TO SKIN TEARS AND 
ABRASION ON RIGHT ARM AND LEFT LEG FROM DRY WALL. PT DENIES HEAD INJURIES. NO 
TRAUMA. HX OF DIABETIC.

## 2019-02-09 NOTE — NUR
ED Nurse Note:



Pt cleared DC by Dr. ECHEVERRIA. Pt is A/Ox4, VSS, DC instruction and prescriptions 
given, pt verbalized understanding. ID wristband removed. All belongings given 
to pt. Pt ambulated out of ER with steady gait. Pt left with the staff from 
[facility].

## 2019-02-09 NOTE — EMERGENCY ROOM REPORT
History of Present Illness


General


Chief Complaint:  General Complaint


Source:  Medical Record





Present Illness


HPI


49 YO Male presents to the ED c/o abrasions of multiples sites on UE and LE's x 

3 days. pt. states injury occurred when picking weeds and brushing up agains 

drywall of a house. pt. reports hx. or DM and COPD. Pt. reports progressive 

erythema and itching at the sites of the wounds with 2/10 in severity pain only 

with scratching. pt. is UTD with Tetanus. Denies taking blood thinning 

medications, denies blisters or vesicles.  Pt. denies fevers, chills or swollen 

tender lymph nodes. Denies lesions/rashes elsewhere on the body. Denies new 

medications or body washes or creams. Denies swelling of the lips, tongue , 

throat or airway. Denies wheezing, or shortness of breath.  Denies recent travel

, recent illness or ill contacts.  denies blisters, oral lesions, or sloughing 

of the skin


Allergies:  


Coded Allergies:  


     No Known Allergies (Unverified , 1/16/18)





Patient History


Past Medical History:  see triage record, DM


Past Surgical History:  none


Pertinent Family History:  none


Social History:  Reports: smoking


Immunizations:  UTD


Reviewed Nursing Documentation:  PMH: Agreed; PSxH: Agreed





Nursing Documentation-PMH


Past Medical History:  No History, Except For


Hx Cardiac Problems:  Yes


Hx Hypertension:  Yes


Hx Asthma:  Yes


Hx Diabetes:  Yes


Hx Gastrointestinal Problems:  Yes


Hx Neurological Problems:  No





Review of Systems


All Other Systems:  negative except mentioned in HPI





Physical Exam





Vital Signs








  Date Time  Temp Pulse Resp B/P (MAP) Pulse Ox O2 Delivery O2 Flow Rate FiO2


 


2/9/19 12:52 98.6 90 18 102/60 86 Room Air  








Sp02 EP Interpretation:  reviewed, normal


General Appearance:  no apparent distress, alert, GCS 15, non-toxic


Head:  normocephalic, atraumatic


Eyes:  bilateral eye normal inspection, bilateral eye PERRL


ENT:  hearing grossly normal, normal voice


Neck:  full range of motion


Respiratory:  chest non-tender, lungs clear, normal breath sounds, no 

respiratory distress, no accessory muscle use, speaking full sentences, 

wheezing - expiratory wheezes bilaterally


Cardiovascular #1:  regular rate, rhythm, no edema


Musculoskeletal:  back normal, gait/station normal, normal range of motion, non-

tender


Neurologic:  alert, oriented x3, responsive, motor strength/tone normal, 

sensory intact, normal gait, speech normal, grossly normal


Psychiatric:  judgement/insight normal


Skin:  normal color, no rash, warm/dry, well hydrated, abrasions - abrasions on

  multiple sites of the upper extremities and lower extremities, lateral 

aspects of the forearms bilaterally as well as anterior medial lateral aspects 

of the lower extremities there is no blisters no vesicles noted bleeding at 

this time scabbed open wounds present no discharge was some surrounding 

erythema no warmth.


Lymphatic:  no adenopathy





Medical Decision Making


PA Attestation


Dr. olmos is my supervising Physician whom patient management has been 

discussed with.


Diagnostic Impression:  


 Primary Impression:  


 Abrasions of multiple sites with infection


 Additional Impression:  


 Wheezing on expiration


ER Course


49 YO Male presents to the ED c/o abrasions of multiples sites on UE and LE's x 

3 days. pt. states injury occurred when picking weeds and brushing up agains 

drywall of a house. pt. reports hx. or DM and COPD. Pt. reports progressive 

erythema and itching at the sites of the wounds with 2/10 in severity pain only 

with scratching. pt. is UTD with Tetanus. Denies taking blood thinning 

medications, denies blisters or vesicles.  Pt. denies fevers, chills or swollen 

tender lymph nodes. Denies lesions/rashes elsewhere on the body. Denies new 

medications or body washes or creams. Denies swelling of the lips, tongue , 

throat or airway. Denies wheezing, or shortness of breath.  Denies recent travel

, recent illness or ill contacts.  denies blisters, oral lesions, or sloughing 

of the skin





Ddx considered but are not limited to cellulitis,arthritis, insect bites, 

allergic reaction just to name a few





Vital signs: are WNL, pt. is afebrile


H&PE are most consistent with ABRASIONS OF MULTIPLE SITES Suspicious for early 

cellulitis. -abrasions on  multiple sites of the upper extremities and lower 

extremities, lateral aspects of the forearms bilaterally as well as anterior 

medial lateral aspects of the lower extremities there is no blisters no 

vesicles noted bleeding at this time scabbed open wounds present no discharge 

was some surrounding erythema no warmth.


ORDERS: none required at this time, the diagnosis is clinical





ED INTERVENTIONS: None required at this time. 





DISCHARGE: At this time pt. is stable for d/c to home. Will provide printed 

patient care instructions, and any necessary prescriptions. Care plan and 

follow up instructions have been discussed with the patient prior to discharge.





Last Vital Signs








  Date Time  Temp Pulse Resp B/P (MAP) Pulse Ox O2 Delivery O2 Flow Rate FiO2


 


2/9/19 12:58  90 18   Room Air  


 


2/9/19 12:55 98.6   102/60 86   








Disposition:  HOME, SELF-CARE


Condition:  Stable


Patient Instructions:  Abrasion, Easy-to-Read





Additional Instructions:  


Take medications as directed. 


 ** Follow up with a Primary Care Provider in 3-5 days, even if your symptoms 

have resolved. ** 


--Please review list of primary care clinics, if you do not already have a 

primary care provider





Return sooner to ED if new symptoms occur, or current symptoms become worse. 








- Please note that this Emergency Department Report was dictated using [employer] technology software, occasionally this can lead to 

erroneous entry secondary to interpretation by the dictation equipment.











Astrid Edmonds Feb 9, 2019 13:19

## 2019-02-21 NOTE — CONSULTATION
DATE OF CONSULTATION:  02/21/2019

CONSULTING PHYSICIAN:  Michael Guzman M.D.



REASON FOR CONSULTATION:

1. Hyponatremia.

2. Lower extremity edema.



HISTORY OF PRESENT ILLNESS:  The patient is a 48-year-old gentleman, who is

being admitted overnight complaining of worsening of his lower extremities

have been swelling and turning red over the past three days.  The patient

says he is not sure for exactly how long his lower legs have began to

swell or when the redness started.  He denies any chest pain or shortness

of breath, feeling otherwise well.  He has not been told of any renal

dysfunction in the past, so it was noted the patient had a creatinine 1.1

and serum sodium 132.



PAST MEDICAL HISTORY:

1. Diabetes mellitus.

2. Hypertension.

3. Congestive heart failure.

4. Chronic obstructive pulmonary disease.



PAST SURGICAL HISTORY:  None.



ALLERGIES:  No known drug allergies.



FAMILY HISTORY:  None.



SOCIAL HISTORY:  Denies any tobacco, alcohol, or illicit drug use.



REVIEW OF SYSTEMS:  NEUROLOGIC:  The patient denies headache, change in

vision, syncope, or presyncopal episodes.  CARDIOVASCULAR:  No current

chest pain, palpitations, or angina.  PULMONARY:  No difficulty breathing,

productive cough, or sputum.  GASTROINTESTINAL/GENITOURINARY:  No change

in urinary or bowel habits.  No nausea, vomiting, or diarrhea.

ENDOCRINOLOGY:  No night sweats, fever, or chills.  MUSCULOSKELETAL:  The

patient is complaining of lower extremity edema.



LABORATORY DATA:  Labs dated 02/20/2019, white cell count 10.4, hemoglobin

16.7, and platelet count 177,000.  Sodium 132, potassium 3.7, and

creatinine 1.1.



PHYSICAL EXAMINATION:

VITAL SIGNS:  Blood pressure 112/70, pulse oximetry 93% on 2 L, pulse 82,

and temperature 97.9.

GENERAL:  The patient is awake and alert, not in distress.

HEENT:  Extraocular muscles intact.  No lymphadenopathy.  Oropharyngeal

mucosa is clear and dry.

CARDIOVASCULAR:  S1, S2.  No rubs or gallops.

PULMONARY:  Clear to auscultation bilaterally.  No rales, rhonchi, or

wheezes.

ABDOMEN:  Obese, nondistended, and nontender.

EXTREMITIES:  4+ pitting edema with erythema noted.



ASSESSMENT AND PLAN:

1. Hyponatremia secondary to volume overload.  At this time, we will

continue Lasix therapy and monitor serum sodium carefully.

2. Lower extremity edema.  2+ protein on UA.  At this time, we will do a

24-hour protein collection to rule out nephrotic syndrome.  The patient is

noted to have diabetes and at this time we will continue to follow protein

level in the urine carefully.

3. Diabetes mellitus per primary care physician.



Let me take this opportunity to thank  _____.









  ______________________________________________

  Michael Guzman MD





DR:  HDP/VR

D:  02/21/2019 08:52

T:  02/21/2019 15:42

JOB#:  [id number]

CC:

## 2019-02-21 NOTE — DIAGNOSTIC IMAGING REPORT
Indication: Shortness of breath

 

Technique: One view of the chest

 

Comparison: 2/20/2018

 

Findings: The heart is enlarged. Is borderline interstitial congestion, similar to

the prior exam. No focal airspace consolidation. No definite effusions. Previously

demonstrated right pleural effusion is no longer evident

 

Impression: Cardiomegaly

 

Borderline interstitial congestion

 

This agrees with the preliminary interpretation provided by the emergency room

physician

## 2019-02-21 NOTE — EMERGENCY ROOM REPORT
History of Present Illness


General


Chief Complaint:  Edema


Source:  Patient





Present Illness


HPI


48-year-old male presents ED for evaluation.  Brought in by EMS.  Complaining 

of bilateral leg swelling. for 3 days.  Per triage O2 sats low.  Patient noted 

to be wheezing.  Patient is a poor historian.  Unable to provide any additional 

history at this time.  Denies chest pain.  Denies fevers or chills.  No other 

aggravating relieving factors.  Denies any other associated symptoms


Allergies:  


Coded Allergies:  


     No Known Allergies (Unverified , 1/16/18)





Patient History


Past Medical History:  DM, HTN, CHF, COPD


Past Surgical History:  none


Pertinent Family History:  none


Social History:  Denies: smoking, alcohol use, drug use


Immunizations:  UTD


Reviewed Nursing Documentation:  PMH: Agreed; PSxH: Agreed





Nursing Documentation-PMH


Hx Cardiac Problems:  Yes


Hx Hypertension:  Yes


Hx Asthma:  Yes


Hx Diabetes:  Yes


Hx Gastrointestinal Problems:  Yes


Hx Neurological Problems:  No





Review of Systems


All Other Systems:  limited





Physical Exam





Vital Signs








  Date Time  Temp Pulse Resp B/P (MAP) Pulse Ox O2 Delivery O2 Flow Rate FiO2


 


2/20/19 20:35 97.7 91 18 112/78 94 Room Air  


 


2/20/19 21:20       6.0 44








Sp02 EP Interpretation:  reviewed, normal


General Appearance:  lethargic


Head:  normocephalic


Eyes:  bilateral eye normal inspection, bilateral eye PERRL


ENT:  normal ENT inspection


Neck:  normal inspection


Respiratory:  wheezing


Cardiovascular #1:  regular rate, rhythm, no edema


Gastrointestinal:  normal bowel sounds, non tender, soft, non-distended, no 

guarding, no rebound


Rectal:  deferred


Genitourinary:  no CVA tenderness


Musculoskeletal:  swelling - 2+ pitting edema b/l LEs


Neurologic:  other - lethargic


Psychiatric:  other - lethargic


Skin:  normal inspection


Lymphatic:  normal inspection





Medical Decision Making


Diagnostic Impression:  


 Primary Impression:  


 CHF (congestive heart failure)


 Qualified Codes:  I50.9 - Heart failure, unspecified


 Additional Impressions:  


 COPD (chronic obstructive pulmonary disease)


 Qualified Codes:  J44.9 - Chronic obstructive pulmonary disease, unspecified


 Hepatic encephalopathy


ER Course


Hospital Course 


48-year-old male presents ED complaining of shortness of breath, leg swelling 

x2 weeks





Differential diagnoses include: MI/unstable angina, contusion, muscle strain, 

PTX, rib fracture





Clinical course


Patient placed on stretcher. on cardiac monitor. After initial history and 

physical I ordered labs, EKG, chest x-ray, nebs





labs reviewed- no leukocytosis, hemoglobin/hematocrit stable, electrolytes ok, 

ammonia elevated, troponins negative, BNP elevated


Chest x-ray- cardiomegally, CHF


EKG - NSR, no acute ischemic changes interpreted by me 





lactulose given. Lasix given.  Case discussed with Dr. Ny and he agreed to 

accept the patient to his service for further care and support





I.  I feel this is a highly complex case requiring extensive working including 

EKG/Rhythm strip, Xray/CT/US, Blood/urine lab work, repeat exams while in ED, 

and administration of strong opiates/narcotics for pain control, admission to 

hospital or close patient follow up.  





Diagnosis - CHF exacerbation, COPD, hepatic encephalopathy





admitted to telemetry in serious condition





Labs








Test


  2/20/19


21:50


 


White Blood Count


  7.4 K/UL


(4.8-10.8)


 


Red Blood Count


  6.38 M/UL


(4.70-6.10)


 


Hemoglobin


  16.7 G/DL


(14.2-18.0)


 


Hematocrit


  54.5 %


(42.0-52.0)


 


Mean Corpuscular Volume 85 FL (80-99) 


 


Mean Corpuscular Hemoglobin


  26.2 PG


(27.0-31.0)


 


Mean Corpuscular Hemoglobin


Concent 30.7 G/DL


(32.0-36.0)


 


Red Cell Distribution Width


  16.4 %


(11.6-14.8)


 


Platelet Count


  177 K/UL


(150-450)


 


Mean Platelet Volume


  7.1 FL


(6.5-10.1)


 


Neutrophils (%) (Auto)


  70.5 %


(45.0-75.0)


 


Lymphocytes (%) (Auto)


  15.1 %


(20.0-45.0)


 


Monocytes (%) (Auto)


  11.7 %


(1.0-10.0)


 


Eosinophils (%) (Auto)


  0.4 %


(0.0-3.0)


 


Basophils (%) (Auto)


  2.4 %


(0.0-2.0)


 


Urine Color Yellow 


 


Urine Appearance Clear 


 


Urine pH 6 (4.5-8.0) 


 


Urine Specific Gravity


  1.010


(1.005-1.035)


 


Urine Protein 2+ (NEGATIVE) 


 


Urine Glucose (UA)


  Negative


(NEGATIVE)


 


Urine Ketones


  Negative


(NEGATIVE)


 


Urine Blood


  Negative


(NEGATIVE)


 


Urine Nitrite


  Negative


(NEGATIVE)


 


Urine Bilirubin


  Negative


(NEGATIVE)


 


Urine Urobilinogen


  1 MG/DL


(0.0-1.0)


 


Urine Leukocyte Esterase 1+ (NEGATIVE) 


 


Urine RBC


  0-2 /HPF (0 -


0)


 


Urine WBC


  2-4 /HPF (0 -


0)


 


Urine Squamous Epithelial


Cells None /LPF


(NONE/OCC)


 


Urine Bacteria


  Few /HPF


(NONE)


 


Sodium Level


  132 MMOL/L


(136-145)


 


Potassium Level


  3.7 MMOL/L


(3.5-5.1)


 


Chloride Level


  89 MMOL/L


()


 


Carbon Dioxide Level


  39 MMOL/L


(21-32)


 


Anion Gap


  2 mmol/L


(5-15)


 


Blood Urea Nitrogen


  23 mg/dL


(7-18)


 


Creatinine


  1.1 MG/DL


(0.55-1.30)


 


Estimat Glomerular Filtration


Rate > 60 mL/min


(>60)


 


Glucose Level


  178 MG/DL


()


 


Calcium Level


  7.9 MG/DL


(8.5-10.1)


 


Total Bilirubin


  0.5 MG/DL


(0.2-1.0)


 


Aspartate Amino Transf


(AST/SGOT) 28 U/L (15-37) 


 


 


Alanine Aminotransferase


(ALT/SGPT) 36 U/L (12-78) 


 


 


Alkaline Phosphatase


  103 U/L


()


 


Ammonia


  65 umol/L


(11-32)


 


Total Creatine Kinase


  188 U/L


()


 


Creatine Kinase MB


  8.3 NG/ML


(0.0-3.6)


 


Creatine Kinase MB Relative


Index 4.4 


 


 


Troponin I


  0.021 ng/mL


(0.000-0.056)


 


Pro-B-Type Natriuretic Peptide


  3320 pg/mL


(0-125)


 


Total Protein


  5.8 G/DL


(6.4-8.2)


 


Albumin


  2.7 G/DL


(3.4-5.0)


 


Globulin 3.1 g/dL 


 


Albumin/Globulin Ratio 0.9 (1.0-2.7) 


 


Serum Alcohol < 3 mg/dL 








EKG Diagnostic Results


Rate:  normal


Rhythm:  NSR


ST Segments:  no acute changes


ASA given to the pt in ED:  No





Rhythm Strip Diag. Results


EP Interpretation:  yes


Rhythm:  NSR, no PVC's, no ectopy





Chest X-Ray Diagnostic Results


Chest X-Ray Diagnostic Results :  


   Chest X-Ray Ordered:  Yes


   # of Views/Limited/Complete:  1 View


   Indication:  Shortness of Breath


   EP Interpretation:  Yes


   Interpretation:  no pneumothorax, other - cardiomegaly, CHF


   Impression:  Other - CHF


   Electronically Signed by:  Electronically signed by Héctor Casper MD





Last Vital Signs








  Date Time  Temp Pulse Resp B/P (MAP) Pulse Ox O2 Delivery O2 Flow Rate FiO2


 


2/21/19 02:22 97.9 70 18 124/78 100 Simple Mask 6.0 


 


2/20/19 22:01        44








Status:  improved


Disposition:  ADMITTED AS INPATIENT


Condition:  Serious


Referrals:  


NON PHYSICIAN (PCP)











Héctor Casper MD Feb 21, 2019 04:39

## 2019-02-21 NOTE — HISTORY AND PHYSICAL REPORT
DATE OF ADMISSION:  02/21/2019

HISTORY OF PRESENT ILLNESS:  This is a 48-year-old male with underlying

psychiatric disorder, unspecified.  He came to the emergency room

overnight complaining of lower extremity swelling as well as wheezing.

The patient is a very poor historian.  In the ER, he was found to be

hypoxic with saturations.  The patient denied fever, chills, night sweats,

cough.  He was seen and worked up in the ER, admitted to the hospital for

management and care.



PAST MEDICAL HISTORY:  Notable for hypertension, CHF, COPD.



HOME MEDICATIONS:  Reviewed and reconciled in the chart.



SOCIAL HISTORY:  Denies alcohol or tobacco usage.  Lives in a local

facility.



PHYSICAL EXAMINATION:

GENERAL:  Reveals a 48-year-old male.

HEENT:  Unremarkable.

VITAL SIGNS:  Blood pressure 130/80, heart rate is 78, respirations 15, O2

saturation 93% on 2 L of oxygen.

CHEST:  Clear breath sounds bilaterally with normal heart sounds.

ABDOMEN:  Soft.

NEUROLOGIC:  Nonfocal.

EXTREMITIES:  There is 2+ bilateral pedal edema.



LABORATORY DATA:  Lab testing was notable for hemoglobin of 16, otherwise

normal CBC.  Chemistry notable for sodium 132, otherwise normal data.

Albumin is noted to be 2.7 suggestive of mild hypoalbuminemia.



IMPRESSION:

1. Bilateral lower extremity edema, etiology uncertain.

2. Underlying psych disorder.

3. Hypertension.

4. Diabetes mellitus.

5. Hyperlipidemia.



DISCUSSION:  Admit to the hospital.  We will continue his home medications.

I noted that currently, he is on Lopid, DiaBeta, _____ Naprosyn,

Protonix, KCl, Seroquel, Risperdal, Serevent/Advair, tramadol, and

trazodone.  I will consult Psychiatry.  We will also consult Cardiology,

continue diuretics and increase it to 40 b.i.d., order 2D echo.  We will

follow carefully.









  ______________________________________________

  True Duffy M.D. DR:  Alfie

D:  02/21/2019 11:35

T:  02/21/2019 16:44

JOB#:  [id number]

CC:

## 2019-02-22 NOTE — PULMONOLOGY PROGRESS NOTE
Assessment/Plan


Assessment/Plan


1. Bilateral lower extremity edema, etiology uncertain.


2. Underlying psych disorder.


3. Hypertension.


4. Diabetes mellitus.


5. Hyperlipidemia.





DISCUSSION:   Continue his home medications.


I will consult Psychiatry.  I will also consult Cardiology,


continue diuretics Lasix 40 b.i.d., await 2D echo.  I will


follow carefully.














  ______________________________________________


  True Duffy M.D.





Subjective


Interval Events:  None


Constitutional:  Reports: no symptoms


HEENT:  Repors: no symptoms


Respiratory:  Reports: no symptoms


Cardiovascular:  Reports: no symptoms


Gastrointestinal/Abdominal:  Reports: no symptoms


Genitourinary:  Reports: no symptoms


Allergies:  


Coded Allergies:  


     No Known Allergies (Unverified , 1/16/18)





Objective





Last 24 Hour Vital Signs








  Date Time  Temp Pulse Resp B/P (MAP) Pulse Ox O2 Delivery O2 Flow Rate FiO2


 


2/22/19 09:47  84 20  92 Nasal Cannula 3.0 32


 


2/22/19 09:33  76 20  88 Room Air  21


 


2/22/19 09:33  76 20   Nasal Cannula 3.0 


 


2/22/19 04:00  108      


 


2/22/19 04:00 98.0 97 20 102/64 (77) 100   


 


2/22/19 00:00  91      


 


2/22/19 00:00 99.3 85 20 103/64 (77) 94   


 


2/21/19 21:00      Nasal Cannula 3.0 


 


2/21/19 20:00  99      


 


2/21/19 20:00 98.0 88 20 104/62 (76) 97   


 


2/21/19 16:00 97.9 82 16 121/78 (92) 94   


 


2/21/19 16:00  74      


 


2/21/19 14:34 97.9 82 19 115/75 94 Nasal Cannula 2.0 28


 


2/21/19 14:31 97.9 82 19 115/75 94 Nasal Cannula 2.0 28


 


2/21/19 12:58  75 14  93 Nasal Cannula 2.0 28


 


2/21/19 12:57        28


 


2/21/19 12:47  79 14  94 Nasal Cannula 1.0 24

















Intake and Output  


 


 2/21/19 2/22/19





 19:00 07:00


 


Intake Total 175 ml 600 ml


 


Output Total 250 ml 


 


Balance -75 ml 600 ml


 


  


 


Intake Oral 120 ml 600 ml


 


IV Total 55 ml 


 


Output Urine Total 250 ml 


 


# Voids 2 3


 


# Bowel Movements 3 1








General Appearance:  no acute distress


HEENT:  normocephalic


Respiratory/Chest:  chest wall non-tender, lungs clear


Cardiovascular:  normal peripheral pulses, normal rate


Abdomen:  normal bowel sounds





Microbiology








 Date/Time


Source Procedure


Growth Status


 


 


 2/21/19 06:00


Rectum VRE Culture - Final


NO VANCOMYCIN RESISTANT ENTEROCOCCUS ... Complete


 


 2/21/19 06:00


Rectum  Received








Laboratory Tests


2/22/19 07:08: 


Sodium Level 131L, Potassium Level 3.9, Chloride Level 87L, Carbon Dioxide 

Level 44*H, Anion Gap 0L, Blood Urea Nitrogen 14, Creatinine 1.0, Estimat 

Glomerular Filtration Rate > 60, Glucose Level 98, Calcium Level 8.4L





Current Medications








 Medications


  (Trade)  Dose


 Ordered  Sig/German


 Route


 PRN Reason  Start Time


 Stop Time Status Last Admin


Dose Admin


 


 Ceftriaxone


 Sodium 1 gm/


 Sodium Chloride  55 ml @ 


 110 mls/hr  Q24H


 IVPB


   2/21/19 12:00


 2/28/19 11:59  2/22/19 12:06


 


 


 Enoxaparin Sodium


  (Lovenox)  40 mg  DAILY


 SUBQ


   2/22/19 09:00


 3/24/19 08:59  2/22/19 09:20


 


 


 Furosemide


  (Lasix)  40 mg  BID


 IV


   2/21/19 09:00


 3/23/19 08:59  2/22/19 09:22


 


 


 Gemfibrozil


  (Lopid)  600 mg  TWICE A  DAY


 ORAL


   2/21/19 09:00


 3/23/19 08:59  2/22/19 09:20


 


 


 Glyburide


  (Diabeta)  5 mg  BID


 ORAL


   2/21/19 09:00


 3/23/19 08:59  2/22/19 09:22


 


 


 Levalbuterol HCl


  (Xopenex)  0.625 mg  Q6HRT


 HHN


   2/21/19 10:30


 2/26/19 10:29  2/22/19 09:33


 


 


 Naproxen


  (Naprosyn)  375 mg  TWICE A  DAY


 ORAL


   2/21/19 18:00


 3/23/19 17:59  2/22/19 09:22


 


 


 Pantoprazole


  (Protonix)  40 mg  DAILY


 ORAL


   2/21/19 09:00


 3/23/19 08:59  2/22/19 09:20


 


 


 Potassium Chloride


  (K-Dur)  40 meq  DAILY


 ORAL


   2/21/19 09:00


 3/23/19 08:59  2/22/19 09:20


 


 


 Quetiapine


 Fumarate


  (SEROquel)  25 mg  Q4H  PRN


 ORAL


 PSYCHOSIS  2/21/19 07:30


 3/23/19 07:29   


 


 


 Quetiapine


 Fumarate


  (SEROquel)  100 mg  BEDTIME


 ORAL


   2/21/19 21:00


 3/23/19 20:59  2/21/19 21:43


 


 


 Risperidone


  (RisperDAL)  2 mg  BEDTIME


 ORAL


   2/21/19 21:00


 3/23/19 20:59  2/21/19 21:43


 


 


 Salmeterol


 Xinafoate/


 Fluticasone


  (Advair 250/50


 Diskus)  1 puffs  BIDRT


 INH


   2/21/19 11:00


 3/23/19 10:59  2/22/19 09:33


 


 


 Tramadol HCl


  (Ultram)  50 mg  Q6H  PRN


 ORAL


 For Pain  2/21/19 07:30


 2/28/19 07:29   


 


 


 Trazodone HCl


  (Desyrel)  100 mg  BEDTIME


 ORAL


   2/21/19 21:00


 3/23/19 20:59  2/21/19 21:43


 

















True Duffy MD Feb 22, 2019 12:11

## 2019-02-22 NOTE — NEPHROLOGY PROGRESS NOTE
Assessment/Plan


Assessment/Plan


A/P





1) LE Edema- on lasix


    - 24 hr urine for protein quantification to r/o neph syndrome


    - patient does have DM which can lead to nephrotic syndrome


2) CHF- on lasix


3) PSY D/O on antiPSY


4) DM- per PCP





Subjective


Date patient seen:  Feb 22, 2019


Time patient seen:  08:15


ROS Limited/Unobtainable:  No


Allergies:  


Coded Allergies:  


     No Known Allergies (Unverified , 1/16/18)


All Systems:  reviewed and negative except above


Subjective


Patient feeling better but LE still swollen





Objective





Last 24 Hour Vital Signs








  Date Time  Temp Pulse Resp B/P (MAP) Pulse Ox O2 Delivery O2 Flow Rate FiO2


 


2/22/19 04:00  108      


 


2/22/19 04:00 98.0 97 20 102/64 (77) 100   


 


2/22/19 00:00  91      


 


2/22/19 00:00 99.3 85 20 103/64 (77) 94   


 


2/21/19 21:00      Nasal Cannula 3.0 


 


2/21/19 20:00  99      


 


2/21/19 20:00 98.0 88 20 104/62 (76) 97   


 


2/21/19 16:00 97.9 82 16 121/78 (92) 94   


 


2/21/19 16:00  74      


 


2/21/19 14:34 97.9 82 19 115/75 94 Nasal Cannula 2.0 28


 


2/21/19 14:31 97.9 82 19 115/75 94 Nasal Cannula 2.0 28


 


2/21/19 12:58  75 14  93 Nasal Cannula 2.0 28


 


2/21/19 12:57        28


 


2/21/19 12:47  79 14  94 Nasal Cannula 1.0 24


 


2/21/19 12:00 97.9 75 17 112/75 (87) 100   


 


2/21/19 12:00  85      


 


2/21/19 10:52  78 15 131/80 93 Nasal Cannula 2.0 


 


2/21/19 09:00      Nasal Cannula 3.0 

















Intake and Output  


 


 2/21/19 2/22/19





 18:59 06:59


 


Intake Total 175 ml 600 ml


 


Output Total 250 ml 


 


Balance -75 ml 600 ml


 


  


 


Intake Oral 120 ml 600 ml


 


IV Total 55 ml 


 


Output Urine Total 250 ml 


 


# Voids 2 3


 


# Bowel Movements 3 1








Laboratory Tests


2/22/19 07:08: 


Sodium Level [Pending], Potassium Level [Pending], Chloride Level [Pending], 

Carbon Dioxide Level [Pending], Blood Urea Nitrogen [Pending], Creatinine [

Pending], Estimat Glomerular Filtration Rate [Pending], Glucose Level [Pending]

, Calcium Level [Pending]


Height (Feet):  6


Height (Inches):  5.00


Weight (Pounds):  230


General Appearance:  no apparent distress, alert


EENT:  normal ENT inspection


Neck:  normal alignment, supple


Cardiovascular:  normal rate, regular rhythm


Respiratory/Chest:  lungs clear, normal breath sounds


Abdomen:  non tender, soft


Edema:  no edema noted Arm (L), no edema noted Arm (R), no edema noted Leg (L), 

no edema noted Leg (R), no edema noted Pedal (L), no edema noted Pedal (R), no 

edema noted Generalized











Michael Guzman MD Feb 22, 2019 08:17

## 2019-02-22 NOTE — CONSULTATION
History of Present Illness


General


Chief Complaint:  Edema





Present Illness


HPI


48-year-old male with hx of schizophrenia who came to the emergency room 

overnight complaining of lower extremity swelling. The pt is pw anxiety 

episodes of agitation. the pt has mod cognitive impairment. the pt is anxious 

and would like to leave the floor and go outside. the pt is not endorsing si/hi


Allergies:  


Coded Allergies:  


     No Known Allergies (Unverified , 1/16/18)





Medication History


Scheduled


Albuterol Sulfate* (Albuterol Sulfate Mdi*), 2 PUFF INH Q3H


Cephalexin* (Keflex*), 500 MG ORAL EVERY 12 HOURS


Diphenhydramine Hcl (Benadryl Allergy), 25 MG PO Q6HR


Furosemide* (Lasix*), 20 MG ORAL DAILY


Glyburide (Glyburide), 5 MG PO BID, (Reported)


Hydrocortisone (Hydrocortisone Cream 2.5%), 1 APPLIC TP BID


Levalbuterol Tartrate (Levalbuterol Tartrate Hfa), 45 MCG IH QID, (Reported)


Mupirocin* (Mupirocin*), 1 APPLIC TOPIC THREE TIMES A DAY


Naproxen Sodium (Naproxen Sodium), 375 MG ORAL TWICE A DAY, (Reported)


Pantoprazole* (Pantoprazole*), 40 MG ORAL DAILY, (Reported)


Potassium Chloride (Potassium Chloride), 8 MEQ PO BID, (Reported)


Quetiapine Fumarate* (Seroquel*), 100 MG ORAL BEDTIME


Trazodone Hcl* (Desyrel*), 100 MG ORAL BEDTIME, (Reported)


Trimethoprim/Sulfamethoxazole (Bactrim Ds Tablet), 1 TAB ORAL Q12HR





Scheduled PRN


Quetiapine Fumarate* (Seroquel*), 25 MG ORAL Q4H PRN


Tramadol Hcl* (Ultram*), 50 MG ORAL Q6H PRN for For Pain, (Reported)





Miscellaneous Medications


Blood Sugar Diagnostic (Freestyle Lite Test Strips), 1 EACH , (Reported)


Gemfibrozil (Gemfibrozil*), 600 MG ORAL, (Reported)


Risperidone (Risperidone), 2 MG ORAL, (Reported)





Patient History


Limited by:  medical condition


History Provided By:  Patient, Medical Record, PMD


Healthcare decision maker





Resuscitation status





Advanced Directive on File


NA





Past Medical/Surgical History


Past Medical/Surgical History:  


(1) Diabetes mellitus


(2) Pulmonary edema


(3) COPD (chronic obstructive pulmonary disease)


(4) Hepatic encephalopathy


(5) CHF (congestive heart failure)





Review of Systems


Psychiatric:  Reports: prior hx, anxiety, depressed feelings, emotional problems

, hallucinations





Physical Exam


General Appearance:  alert, overweight


Neurologic:  oriented x 3, responsive, depressed affect





Last 24 Hour Vital Signs








  Date Time  Temp Pulse Resp B/P (MAP) Pulse Ox O2 Delivery O2 Flow Rate FiO2


 


2/22/19 09:47  84 20  92 Nasal Cannula 3.0 32


 


2/22/19 09:33  76 20  88 Room Air  21


 


2/22/19 09:33  76 20   Nasal Cannula 3.0 


 


2/22/19 04:00  108      


 


2/22/19 04:00 98.0 97 20 102/64 (77) 100   


 


2/22/19 00:00  91      


 


2/22/19 00:00 99.3 85 20 103/64 (77) 94   


 


2/21/19 21:00      Nasal Cannula 3.0 


 


2/21/19 20:00  99      


 


2/21/19 20:00 98.0 88 20 104/62 (76) 97   


 


2/21/19 16:00 97.9 82 16 121/78 (92) 94   


 


2/21/19 16:00  74      


 


2/21/19 14:34 97.9 82 19 115/75 94 Nasal Cannula 2.0 28


 


2/21/19 14:31 97.9 82 19 115/75 94 Nasal Cannula 2.0 28

















Intake and Output  


 


 2/21/19 2/22/19





 19:00 07:00


 


Intake Total 175 ml 600 ml


 


Output Total 250 ml 


 


Balance -75 ml 600 ml


 


  


 


Intake Oral 120 ml 600 ml


 


IV Total 55 ml 


 


Output Urine Total 250 ml 


 


# Voids 2 3


 


# Bowel Movements 3 1











Laboratory Tests








Test


  2/22/19


07:08


 


Sodium Level


  131 MMOL/L


(136-145)  L


 


Potassium Level


  3.9 MMOL/L


(3.5-5.1)


 


Chloride Level


  87 MMOL/L


()  L


 


Carbon Dioxide Level


  44 MMOL/L


(21-32)  *H


 


Anion Gap


  0 mmol/L


(5-15)  L


 


Blood Urea Nitrogen


  14 mg/dL


(7-18)


 


Creatinine


  1.0 MG/DL


(0.55-1.30)


 


Estimat Glomerular Filtration


Rate > 60 mL/min


(>60)


 


Glucose Level


  98 MG/DL


()


 


Calcium Level


  8.4 MG/DL


(8.5-10.1)  L








Height (Feet):  6


Height (Inches):  5.00


Weight (Pounds):  230


Medications





Current Medications








 Medications


  (Trade)  Dose


 Ordered  Sig/German


 Route


 PRN Reason  Start Time


 Stop Time Status Last Admin


Dose Admin


 


 Ceftriaxone


 Sodium 1 gm/


 Sodium Chloride  55 ml @ 


 110 mls/hr  Q24H


 IVPB


   2/21/19 12:00


 2/28/19 11:59  2/22/19 12:06


 


 


 Enoxaparin Sodium


  (Lovenox)  40 mg  DAILY


 SUBQ


   2/22/19 09:00


 3/24/19 08:59  2/22/19 09:20


 


 


 Furosemide


  (Lasix)  40 mg  BID


 IV


   2/21/19 09:00


 3/23/19 08:59  2/22/19 09:22


 


 


 Gemfibrozil


  (Lopid)  600 mg  TWICE A  DAY


 ORAL


   2/21/19 09:00


 3/23/19 08:59  2/22/19 09:20


 


 


 Glyburide


  (Diabeta)  5 mg  BID


 ORAL


   2/21/19 09:00


 3/23/19 08:59  2/22/19 09:22


 


 


 Levalbuterol HCl


  (Xopenex)  0.625 mg  Q6HRT


 HHN


   2/21/19 10:30


 2/26/19 10:29  2/22/19 09:33


 


 


 Naproxen


  (Naprosyn)  375 mg  TWICE A  DAY


 ORAL


   2/21/19 18:00


 3/23/19 17:59  2/22/19 09:22


 


 


 Pantoprazole


  (Protonix)  40 mg  DAILY


 ORAL


   2/21/19 09:00


 3/23/19 08:59  2/22/19 09:20


 


 


 Potassium Chloride


  (K-Dur)  40 meq  DAILY


 ORAL


   2/21/19 09:00


 3/23/19 08:59  2/22/19 09:20


 


 


 Quetiapine


 Fumarate


  (SEROquel)  25 mg  Q4H  PRN


 ORAL


 PSYCHOSIS  2/21/19 07:30


 3/23/19 07:29   


 


 


 Quetiapine


 Fumarate


  (SEROquel)  100 mg  BEDTIME


 ORAL


   2/21/19 21:00


 3/23/19 20:59  2/21/19 21:43


 


 


 Risperidone


  (RisperDAL)  2 mg  BEDTIME


 ORAL


   2/21/19 21:00


 3/23/19 20:59  2/21/19 21:43


 


 


 Salmeterol


 Xinafoate/


 Fluticasone


  (Advair 250/50


 Diskus)  1 puffs  BIDRT


 INH


   2/21/19 11:00


 3/23/19 10:59  2/22/19 09:33


 


 


 Tramadol HCl


  (Ultram)  50 mg  Q6H  PRN


 ORAL


 For Pain  2/21/19 07:30


 2/28/19 07:29   


 


 


 Trazodone HCl


  (Desyrel)  100 mg  BEDTIME


 ORAL


   2/21/19 21:00


 3/23/19 20:59  2/21/19 21:43


 











Assessment/Plan


Problem List:  


(1) Schizophrenia


ICD Codes:  F20.9 - Schizophrenia, unspecified


SNOMED:  [id number]


Assessment/Plan


Risperdal 2mg po qhs


Ativan prn


provided deborah/Leland Coats MD Feb 22, 2019 13:59

## 2019-02-23 NOTE — CONSULTATION
DATE OF CONSULTATION:  02/22/2019

CONSULTING PHYSICIAN:  Franklin Murphy M.D.



REQUESTING PHYSICIAN:  True Duffy M.D.



REASON FOR CONSULTATION:  Edema, evaluate for congestive heart failure.



HISTORY OF PRESENT ILLNESS:  This 48-year-old male has a history of bipolar

disorder and presented to the hospital with shortness of breath, leg

swelling, and wheezing.  He was hypoxic in the emergency room and admitted

yesterday for further management.  Since admission, he has received some

diuretic therapy.  He had an echocardiogram today that revealed ejection

fraction of 50% to 55%.  No regional wall motion abnormalities.  Mild

tricuspid regurgitation and mild-to-moderate pulmonary hypertension with

PA systolic pressure of 49 mmHg.  The patient's admission chest x-ray

reveals pulmonary cardiomegaly and mild pulmonary venous congestion.  The

patient also had a venous duplex scan that was negative for DVT.



PAST MEDICAL HISTORY:  Hypertension, hyperlipidemia, type 2 diabetes

mellitus, and COPD.



ALLERGIES:  None.



SOCIAL HISTORY:  Negative for smoking.  Denied alcohol abuse.  No drug

use.



REVIEW OF SYSTEMS:  A 10-point review of systems performed, all systems

negative other than noted above.



PHYSICAL EXAMINATION:

VITAL SIGNS:  Blood pressure 109/70, pulse 78, and respiratory rate 18.  No

fevers.  Room air oxygen sats are 89% and on 2 L 94%.

NECK:  Supple.  Jugular venous pressure is slightly elevated.

LUNGS:  Few rales.

CARDIAC:  Regular rhythm and rate.  Normal S1 and S2.  A 1/6 systolic

murmur at lower left sternal border.

ABDOMEN:  Soft and nontender.

EXTREMITIES:  1+ dependent edema.  There is mild ascites.



IMPRESSION:

1. Volume overload.

2. Acute and chronic diastolic congestive heart failure.

3. Lower extremity edema.

4. History of COPD.

5. Hypoxia.



PLAN:

1. Nasal oxygen.

2. Diuresis.

3. Bronchodilator.

4. DVT prophylaxis.

5. Further recommendations will follow based on clinical parameters.

6. We will consider angiotensin-converting enzyme inhibitor therapy as

well.

7. May consider further imaging of the hepatobiliary tract to assess for

contributing liver disease.









  ______________________________________________

  Franklin Murphy M.D.





DR:  JUANITA

D:  02/23/2019 00:36

T:  02/23/2019 00:46

JOB#:  [id number]

CC:

## 2019-02-23 NOTE — NEPHROLOGY PROGRESS NOTE
Assessment/Plan


Assessment/Plan


A/P





1) LE Edema- on lasix


    - 24 hr urine for protein quantification negative for proteinuria


    - OK for DC from renal point


2) CHF- change lasix to po


3) PSY D/O on antiPSY


4) DM- per PCP





Subjective


Date patient seen:  Feb 23, 2019


Time patient seen:  09:08


ROS Limited/Unobtainable:  No


Allergies:  


Coded Allergies:  


     No Known Allergies (Unverified , 1/16/18)


Subjective


Patient feeling better. Breathing and LE edema improved





Objective





Last 24 Hour Vital Signs








  Date Time  Temp Pulse Resp B/P (MAP) Pulse Ox O2 Delivery O2 Flow Rate FiO2


 


2/23/19 07:34  68 22  96 Nasal Cannula 1.0 


 


2/23/19 07:33  60 18   Room Air  21


 


2/23/19 07:21  60 18  86 Room Air  21


 


2/23/19 04:25 97.9 80 18 125/75 (92) 95   


 


2/23/19 04:00  88      


 


2/23/19 02:09  71 20  98 Nasal Cannula 1.0 


 


2/23/19 02:05  74 20  92 Nasal Cannula 1.0 


 


2/23/19 00:00 98.0 82 19 118/72 (87) 95   


 


2/23/19 00:00  77      


 


2/22/19 21:22  70 20  98 Nasal Cannula 1.0 


 


2/22/19 21:16  69 20   Nasal Cannula 1.0 


 


2/22/19 21:15  69 20  92 Nasal Cannula 1.0 


 


2/22/19 21:00      Nasal Cannula 3.0 


 


2/22/19 20:00 97.9 77 18 127/81 (96) 95   


 


2/22/19 20:00  80      


 


2/22/19 16:00 98.6 88 22 114/84 (94) 96   


 


2/22/19 16:00  85      


 


2/22/19 15:54  79 20  89 Room Air  21


 


2/22/19 15:54  97 20  94 Nasal Cannula 3.0 32


 


2/22/19 12:00 98.0 78 18 109/70 (83) 96   


 


2/22/19 09:47  84 20  92 Nasal Cannula 3.0 32


 


2/22/19 09:33  76 20  88 Room Air  21


 


2/22/19 09:33  76 20   Nasal Cannula 3.0 

















Intake and Output  


 


 2/22/19 2/23/19





 18:59 06:59


 


Intake Total 1500 ml 


 


Balance 1500 ml 


 


  


 


Other 1500 ml 


 


# Voids  3








Laboratory Tests


2/23/19 06:40: 


Sodium Level 131L, Potassium Level 3.8, Chloride Level 90L, Carbon Dioxide 

Level 41*H, Anion Gap -1L, Blood Urea Nitrogen 14, Creatinine 1.0, Estimat 

Glomerular Filtration Rate > 60, Glucose Level 76, Calcium Level 8.1L


Height (Feet):  6


Height (Inches):  5.00


Weight (Pounds):  230


General Appearance:  no apparent distress, alert


EENT:  normal ENT inspection


Neck:  normal alignment, supple


Cardiovascular:  normal rate, regular rhythm


Respiratory/Chest:  lungs clear, normal breath sounds


Abdomen:  non tender, soft


Edema:  2+ Arm (L), 2+ Arm (R), 2+ Leg (L), 2+ Leg (R), 2+ Pedal (L), 2+ Pedal (

R), 2+ Generalized











Michael Guzman MD Feb 23, 2019 09:10

## 2019-02-23 NOTE — PULMONOLOGY PROGRESS NOTE
Assessment/Plan


Assessment/Plan


1. Bilateral lower extremity edema, etiology uncertain.


2. Underlying psych disorder.


3. Hypertension.


4. Diabetes mellitus.


5. Hyperlipidemia.





DISCUSSION:   Continue his home medications.





Duplex negative for DVT


ECHO shows normal LVEF, LVH nad diastolic dysfunction





Will dc home














  ______________________________________________


  True Duffy M.D.





Subjective


Interval Events:  Much better; feels Ok


Constitutional:  Reports: no symptoms


HEENT:  Repors: no symptoms


Respiratory:  Reports: no symptoms


Cardiovascular:  Reports: no symptoms


Gastrointestinal/Abdominal:  Reports: no symptoms


Genitourinary:  Reports: no symptoms


Allergies:  


Coded Allergies:  


     No Known Allergies (Unverified , 1/16/18)





Objective





Last 24 Hour Vital Signs








  Date Time  Temp Pulse Resp B/P (MAP) Pulse Ox O2 Delivery O2 Flow Rate FiO2


 


2/23/19 08:00  84 21 118/74 (89)    


 


2/23/19 07:34  68 22  96 Nasal Cannula 1.0 


 


2/23/19 07:33  60 18   Room Air  21


 


2/23/19 07:21  60 18  86 Room Air  21


 


2/23/19 04:25 97.9 80 18 125/75 (92) 95   


 


2/23/19 04:00  88      


 


2/23/19 02:09  71 20  98 Nasal Cannula 1.0 


 


2/23/19 02:05  74 20  92 Nasal Cannula 1.0 


 


2/23/19 00:00 98.0 82 19 118/72 (87) 95   


 


2/23/19 00:00  77      


 


2/22/19 21:22  70 20  98 Nasal Cannula 1.0 


 


2/22/19 21:16  69 20   Nasal Cannula 1.0 


 


2/22/19 21:15  69 20  92 Nasal Cannula 1.0 


 


2/22/19 21:00      Nasal Cannula 3.0 


 


2/22/19 20:00 97.9 77 18 127/81 (96) 95   


 


2/22/19 20:00  80      


 


2/22/19 16:00 98.6 88 22 114/84 (94) 96   


 


2/22/19 16:00  85      


 


2/22/19 15:54  79 20  89 Room Air  21


 


2/22/19 15:54  97 20  94 Nasal Cannula 3.0 32


 


2/22/19 12:00 98.0 78 18 109/70 (83) 96   

















Intake and Output  


 


 2/22/19 2/23/19





 18:59 06:59


 


Intake Total 1500 ml 


 


Balance 1500 ml 


 


  


 


Other 1500 ml 


 


# Voids  3








General Appearance:  no acute distress


HEENT:  normocephalic


Respiratory/Chest:  chest wall non-tender, lungs clear


Cardiovascular:  normal peripheral pulses


Extremities:  no cyanosis





Microbiology








 Date/Time


Source Procedure


Growth Status


 


 


 2/21/19 06:00


Nasal Nares MRSA Culture - Final


NO METHICILLIN RESISTANT STAPH AUREUS... Complete


 


 2/21/19 06:00


Rectum VRE Culture - Final


NO VANCOMYCIN RESISTANT ENTEROCOCCUS ... Complete


 


 2/21/19 06:00


Rectum - Final


NO CARBAPENEM-RESISTANT ENTEROBACTERI... Complete








Laboratory Tests


2/23/19 06:40: 


Sodium Level 131L, Potassium Level 3.8, Chloride Level 90L, Carbon Dioxide 

Level 41*H, Anion Gap -1L, Blood Urea Nitrogen 14, Creatinine 1.0, Estimat 

Glomerular Filtration Rate > 60, Glucose Level 76, Calcium Level 8.1L





Current Medications








 Medications


  (Trade)  Dose


 Ordered  Sig/German


 Route


 PRN Reason  Start Time


 Stop Time Status Last Admin


Dose Admin


 


 Ceftriaxone


 Sodium 1 gm/


 Sodium Chloride  55 ml @ 


 110 mls/hr  Q24H


 IVPB


   2/21/19 12:00


 2/28/19 11:59  2/22/19 12:06


 


 


 Enoxaparin Sodium


  (Lovenox)  40 mg  DAILY


 SUBQ


   2/22/19 09:00


 3/24/19 08:59  2/23/19 09:25


 


 


 Furosemide


  (Lasix)  40 mg  EVERY 12  HOURS


 ORAL


   2/23/19 09:45


 3/25/19 09:44   


 


 


 Gemfibrozil


  (Lopid)  600 mg  TWICE A  DAY


 ORAL


   2/21/19 09:00


 3/23/19 08:59  2/23/19 09:23


 


 


 Glyburide


  (Diabeta)  5 mg  BID


 ORAL


   2/21/19 09:00


 3/23/19 08:59  2/23/19 09:23


 


 


 Levalbuterol HCl


  (Xopenex)  0.625 mg  Q6HRT


 HHN


   2/21/19 10:30


 2/26/19 10:29  2/23/19 07:33


 


 


 Lorazepam


  (Ativan)  2 mg  Q6H  PRN


 ORAL


 For Anxiety  2/22/19 14:15


 3/1/19 14:14  2/23/19 01:43


 


 


 Naproxen


  (Naprosyn)  375 mg  TWICE A  DAY


 ORAL


   2/21/19 18:00


 3/23/19 17:59  2/23/19 09:23


 


 


 Pantoprazole


  (Protonix)  40 mg  DAILY


 ORAL


   2/21/19 09:00


 3/23/19 08:59  2/23/19 09:22


 


 


 Potassium Chloride


  (K-Dur)  40 meq  DAILY


 ORAL


   2/21/19 09:00


 3/23/19 08:59  2/23/19 09:23


 


 


 Quetiapine


 Fumarate


  (SEROquel)  25 mg  Q4H  PRN


 ORAL


 PSYCHOSIS  2/21/19 07:30


 3/23/19 07:29   


 


 


 Risperidone


  (RisperDAL)  4 mg  BEDTIME


 ORAL


   2/22/19 21:00


 3/24/19 20:59  2/22/19 20:54


 


 


 Salmeterol


 Xinafoate/


 Fluticasone


  (Advair 250/50


 Diskus)  1 puffs  BIDRT


 INH


   2/21/19 11:00


 3/23/19 10:59  2/23/19 09:13


 


 


 Tramadol HCl


  (Ultram)  50 mg  Q6H  PRN


 ORAL


 For Pain  2/21/19 07:30


 2/28/19 07:29   


 


 


 Trazodone HCl


  (Desyrel)  100 mg  BEDTIME


 ORAL


   2/21/19 21:00


 3/23/19 20:59  2/22/19 20:54


 

















True Duffy MD Feb 23, 2019 10:40

## 2019-02-24 NOTE — PROGRESS NOTE
DATE:  02/23/2019

CARDIOLOGY PROGRESS NOTE



SUBJECTIVE:  The patient has no new complaints.  Leg swelling persists with

no pain.  Workup included a venous duplex negative for DVT.  A 2D echo

revealed a normal ejection fraction with diastolic dysfunction.



OBJECTIVE:

VITAL SIGNS:  Blood pressure 118/74, pulse 84, and respiratory rate 21.

LUNGS:  Clear.

CARDIAC:  Regular.  Normal S1 and S2.

ABDOMEN:  Soft.

EXTREMITIES:  With 1+ dependent edema.



Medication regimen reviewed.



IMPRESSION:

1. Type 2 diabetes mellitus.

2. Bipolar disorder.

3. Chronic venous insufficiency.



RECOMMENDATIONS:

1. Consider outpatient abdominal ultrasound to evaluate for ascites.

2. Salt restriction.

3. Low-dose diuretics.

4. Compression hose.

5. Urine collection to evaluate for proteinuria.









  ______________________________________________

  Franklin Murphy M.D.





DR:  LILY

D:  02/24/2019 04:08

T:  02/24/2019 04:39

JOB#:  [id number]

CC:

## 2019-02-24 NOTE — DISCHARGE SUMMARY
Discharge Summary


Discharge Summary


_


DATE OF ADMISSION: 2/21/2019





DATE OF DISCHARGE: 2/23/2019








DISCHARGED BY: Dr. Duffy





REASON FOR ADMISSION: 


48 years old male with past medical history of hypertension, congestive heart 

failure, COPD, psychiatric disorder , came to emergency department complaining 

of lower extremities  swelling and wheezing.  


Patient denied fever , chills, night sweats, no cough. 


Upon evaluation patient was found to be hypoxic and required supplemental 

oxygen to keep appropriate pulse oximetry.


Laboratory workup revealed no leukocytosis, stable hemoglobin and hematocrit.


Urinalysis revealed no evidence of UTI, showed +2 protein, +1 leukocyte esterase

, few bacteria, no pyuria.


Electrolytes revealed sodium 132, renal parameters with   BUN and 23 creatinine 

1.1.


Troponin was 0.021.


EKG revealed normal sinus rhythm, no acute ischemic changes.


Pro BNP 3320.


LFTs stable


Chest x-ray revealed cardiomegaly with congestive heart failure.


Patient was admitted for further management





CONSULTANTS:


cardiologist 


nephrologist dr.De Villasenor


psychiatrist 


 


[facility] COURSE: 


Patient was admitted to the hospital .


Echocardiogram revealed preserved ejection fraction of   50-55%.  No evidence 

of wall motion abnormality.  


Mild left ventricular hypertrophy.  


Right ventricular systolic pressure of 49 consistent with   moderate pulmonary 

hypertension.


Venous duplex bilateral lower extremity revealed no evidence of acute DVT.  


Patient started on low-dose of diuretic with close monitoring of volumes and 

cardiorenal parameters.  


Salt restriction was maintained.  provided.  Compression hose provided.  


Blood pressure and  blood sugar were  managed with current regimen . 


Cardiologist recommended outpatient abdominal ultrasound to evaluate for 

ascites.


Nephrologist closely followed.


Urinalysis revealed +2 protein.  


Renal parameters and electrolytes were closely monitored,  electrolytes 

corrected as needed.  


Nephrotoxins were avoided.  


24 hours urine for protein quantification was negative for for proteinuria.  


Lasix was changed to oral.   


Supplemental oxygen provided as needed to keep pulse oximetry above 92% , 

pulmonary toilet provided as needed.  


Prior to discharge  , pulse oximetry  was stable on room air. 


Psychiatrist followed and  diagnosed patient  with schizophrenia.  


Psychiatric medication regimen optimized.  


Patient started on Risperdal.  


Reality orientation supportive therapy provided.


Clinically stabilized and was ready for discharge home





FINAL DIAGNOSES: 


Bilateral lower extremity edema


Congestive heart failure diastolic dysfunctio  


Hypertension


Diabetes mellitus


Hyperlipidemia


Schizophrenia 





DISCHARGE MEDICATIONS:


See Medication Reconciliation list.





DISCHARGE INSTRUCTIONS:


Patient was discharged home .


Follow up with primary care provider in one week.








I have been assigned to dictate discharge summary for this account. 


I was not involved in the patient's management. she had a repeat hemoglobin











Monica Anthony NP Feb 24, 2019 14:16

## 2019-02-25 NOTE — DIAGNOSTIC IMAGING REPORT
--------------- APPROVED REPORT --------------





CPT Code: 72807



Present Symptoms

Comments: Screening Hx of swelling





BILATERAL: Imaging reveals a patent deep venous system bilaterally. There is no evidence 

of thrombus within the common femoral, superficial femoral, popliteal or tibial segments. 

The greater saphenous veins are within normal limits. Doppler indicates normal 

spontaneous flow within these segments.

## 2020-01-21 ENCOUNTER — HOSPITAL ENCOUNTER (INPATIENT)
Dept: HOSPITAL 72 - EMR | Age: 50
LOS: 11 days | Discharge: NURSING, REHAB OR LONG-TERM CARE | DRG: 207 | End: 2020-02-01
Payer: MEDICAID

## 2020-01-21 VITALS — SYSTOLIC BLOOD PRESSURE: 110 MMHG | DIASTOLIC BLOOD PRESSURE: 64 MMHG

## 2020-01-21 VITALS — WEIGHT: 238.38 LBS | BODY MASS INDEX: 29.64 KG/M2 | HEIGHT: 75 IN

## 2020-01-21 VITALS — DIASTOLIC BLOOD PRESSURE: 81 MMHG | SYSTOLIC BLOOD PRESSURE: 111 MMHG

## 2020-01-21 VITALS — DIASTOLIC BLOOD PRESSURE: 68 MMHG | SYSTOLIC BLOOD PRESSURE: 104 MMHG

## 2020-01-21 VITALS — DIASTOLIC BLOOD PRESSURE: 68 MMHG | SYSTOLIC BLOOD PRESSURE: 130 MMHG

## 2020-01-21 DIAGNOSIS — E87.5: ICD-10-CM

## 2020-01-21 DIAGNOSIS — I48.0: ICD-10-CM

## 2020-01-21 DIAGNOSIS — R18.8: ICD-10-CM

## 2020-01-21 DIAGNOSIS — I21.9: ICD-10-CM

## 2020-01-21 DIAGNOSIS — I11.0: ICD-10-CM

## 2020-01-21 DIAGNOSIS — E83.42: ICD-10-CM

## 2020-01-21 DIAGNOSIS — E43: ICD-10-CM

## 2020-01-21 DIAGNOSIS — E11.649: ICD-10-CM

## 2020-01-21 DIAGNOSIS — J44.1: Primary | ICD-10-CM

## 2020-01-21 DIAGNOSIS — E87.8: ICD-10-CM

## 2020-01-21 DIAGNOSIS — E78.5: ICD-10-CM

## 2020-01-21 DIAGNOSIS — Z79.84: ICD-10-CM

## 2020-01-21 DIAGNOSIS — I48.92: ICD-10-CM

## 2020-01-21 DIAGNOSIS — F12.90: ICD-10-CM

## 2020-01-21 DIAGNOSIS — E87.1: ICD-10-CM

## 2020-01-21 DIAGNOSIS — K74.60: ICD-10-CM

## 2020-01-21 DIAGNOSIS — J96.02: ICD-10-CM

## 2020-01-21 DIAGNOSIS — I27.20: ICD-10-CM

## 2020-01-21 DIAGNOSIS — F20.9: ICD-10-CM

## 2020-01-21 DIAGNOSIS — I50.43: ICD-10-CM

## 2020-01-21 DIAGNOSIS — G93.41: ICD-10-CM

## 2020-01-21 DIAGNOSIS — J96.01: ICD-10-CM

## 2020-01-21 LAB
ADD MANUAL DIFF: NO
ALBUMIN SERPL-MCNC: 2.7 G/DL (ref 3.4–5)
ALBUMIN/GLOB SERPL: 0.9 {RATIO} (ref 1–2.7)
ALP SERPL-CCNC: 120 U/L (ref 46–116)
ALT SERPL-CCNC: 1182 U/L (ref 12–78)
ANION GAP SERPL CALC-SCNC: 5 MMOL/L (ref 5–15)
APPEARANCE UR: CLEAR
APTT BLD: 26 SEC (ref 23–33)
AST SERPL-CCNC: 377 U/L (ref 15–37)
BASOPHILS NFR BLD AUTO: 2 % (ref 0–2)
BILIRUB DIRECT SERPL-MCNC: 1 MG/DL (ref 0–0.3)
BILIRUB SERPL-MCNC: 1.5 MG/DL (ref 0.2–1)
BUN SERPL-MCNC: 39 MG/DL (ref 7–18)
CALCIUM SERPL-MCNC: 7.4 MG/DL (ref 8.5–10.1)
CHLORIDE SERPL-SCNC: 92 MMOL/L (ref 98–107)
CK SERPL-CCNC: 238 U/L (ref 26–308)
CO2 SERPL-SCNC: 35 MMOL/L (ref 21–32)
CREAT SERPL-MCNC: 1 MG/DL (ref 0.55–1.3)
EOSINOPHIL NFR BLD AUTO: 0.2 % (ref 0–3)
ERYTHROCYTE [DISTWIDTH] IN BLOOD BY AUTOMATED COUNT: 17.2 % (ref 11.6–14.8)
GLOBULIN SER-MCNC: 3.1 G/DL
GLUCOSE UR STRIP-MCNC: NEGATIVE MG/DL
HCT VFR BLD CALC: 51.8 % (ref 42–52)
HGB BLD-MCNC: 16.4 G/DL (ref 14.2–18)
INR PPP: 1.3 (ref 0.9–1.1)
KETONES UR QL STRIP: NEGATIVE
LEUKOCYTE ESTERASE UR QL STRIP: NEGATIVE
LYMPHOCYTES NFR BLD AUTO: 10.9 % (ref 20–45)
MCV RBC AUTO: 84 FL (ref 80–99)
MONOCYTES NFR BLD AUTO: 16.6 % (ref 1–10)
NEUTROPHILS NFR BLD AUTO: 70.2 % (ref 45–75)
NITRITE UR QL STRIP: NEGATIVE
PH UR STRIP: 6 [PH] (ref 4.5–8)
PLATELET # BLD: 172 K/UL (ref 150–450)
POTASSIUM SERPL-SCNC: 4.2 MMOL/L (ref 3.5–5.1)
RBC # BLD AUTO: 6.19 M/UL (ref 4.7–6.1)
SODIUM SERPL-SCNC: 131 MMOL/L (ref 136–145)
SP GR UR STRIP: 1.01 (ref 1–1.03)
UROBILINOGEN UR-MCNC: 8 MG/DL (ref 0–1)
WBC # BLD AUTO: 8.8 K/UL (ref 4.8–10.8)

## 2020-01-21 PROCEDURE — 76700 US EXAM ABDOM COMPLETE: CPT

## 2020-01-21 PROCEDURE — 80048 BASIC METABOLIC PNL TOTAL CA: CPT

## 2020-01-21 PROCEDURE — 96374 THER/PROPH/DIAG INJ IV PUSH: CPT

## 2020-01-21 PROCEDURE — 87340 HEPATITIS B SURFACE AG IA: CPT

## 2020-01-21 PROCEDURE — 36600 WITHDRAWAL OF ARTERIAL BLOOD: CPT

## 2020-01-21 PROCEDURE — 82803 BLOOD GASES ANY COMBINATION: CPT

## 2020-01-21 PROCEDURE — 93005 ELECTROCARDIOGRAM TRACING: CPT

## 2020-01-21 PROCEDURE — 93970 EXTREMITY STUDY: CPT

## 2020-01-21 PROCEDURE — 80061 LIPID PANEL: CPT

## 2020-01-21 PROCEDURE — 86705 HEP B CORE ANTIBODY IGM: CPT

## 2020-01-21 PROCEDURE — 87081 CULTURE SCREEN ONLY: CPT

## 2020-01-21 PROCEDURE — 80076 HEPATIC FUNCTION PANEL: CPT

## 2020-01-21 PROCEDURE — 86803 HEPATITIS C AB TEST: CPT

## 2020-01-21 PROCEDURE — 86140 C-REACTIVE PROTEIN: CPT

## 2020-01-21 PROCEDURE — 94003 VENT MGMT INPAT SUBQ DAY: CPT

## 2020-01-21 PROCEDURE — 96361 HYDRATE IV INFUSION ADD-ON: CPT

## 2020-01-21 PROCEDURE — 86709 HEPATITIS A IGM ANTIBODY: CPT

## 2020-01-21 PROCEDURE — 84484 ASSAY OF TROPONIN QUANT: CPT

## 2020-01-21 PROCEDURE — 80053 COMPREHEN METABOLIC PANEL: CPT

## 2020-01-21 PROCEDURE — 82248 BILIRUBIN DIRECT: CPT

## 2020-01-21 PROCEDURE — 83690 ASSAY OF LIPASE: CPT

## 2020-01-21 PROCEDURE — 84100 ASSAY OF PHOSPHORUS: CPT

## 2020-01-21 PROCEDURE — 99291 CRITICAL CARE FIRST HOUR: CPT

## 2020-01-21 PROCEDURE — 84132 ASSAY OF SERUM POTASSIUM: CPT

## 2020-01-21 PROCEDURE — 82550 ASSAY OF CK (CPK): CPT

## 2020-01-21 PROCEDURE — 71045 X-RAY EXAM CHEST 1 VIEW: CPT

## 2020-01-21 PROCEDURE — 94002 VENT MGMT INPAT INIT DAY: CPT

## 2020-01-21 PROCEDURE — 71275 CT ANGIOGRAPHY CHEST: CPT

## 2020-01-21 PROCEDURE — 94660 CPAP INITIATION&MGMT: CPT

## 2020-01-21 PROCEDURE — 80307 DRUG TEST PRSMV CHEM ANLYZR: CPT

## 2020-01-21 PROCEDURE — 83880 ASSAY OF NATRIURETIC PEPTIDE: CPT

## 2020-01-21 PROCEDURE — 94664 DEMO&/EVAL PT USE INHALER: CPT

## 2020-01-21 PROCEDURE — 81003 URINALYSIS AUTO W/O SCOPE: CPT

## 2020-01-21 PROCEDURE — 94640 AIRWAY INHALATION TREATMENT: CPT

## 2020-01-21 PROCEDURE — 83036 HEMOGLOBIN GLYCOSYLATED A1C: CPT

## 2020-01-21 PROCEDURE — 82977 ASSAY OF GGT: CPT

## 2020-01-21 PROCEDURE — 93306 TTE W/DOPPLER COMPLETE: CPT

## 2020-01-21 PROCEDURE — 83735 ASSAY OF MAGNESIUM: CPT

## 2020-01-21 PROCEDURE — 85730 THROMBOPLASTIN TIME PARTIAL: CPT

## 2020-01-21 PROCEDURE — 74018 RADEX ABDOMEN 1 VIEW: CPT

## 2020-01-21 PROCEDURE — 82962 GLUCOSE BLOOD TEST: CPT

## 2020-01-21 PROCEDURE — 84550 ASSAY OF BLOOD/URIC ACID: CPT

## 2020-01-21 PROCEDURE — 85610 PROTHROMBIN TIME: CPT

## 2020-01-21 PROCEDURE — 85025 COMPLETE CBC W/AUTO DIFF WBC: CPT

## 2020-01-21 PROCEDURE — 83605 ASSAY OF LACTIC ACID: CPT

## 2020-01-21 PROCEDURE — 36415 COLL VENOUS BLD VENIPUNCTURE: CPT

## 2020-01-21 PROCEDURE — 84443 ASSAY THYROID STIM HORMONE: CPT

## 2020-01-21 PROCEDURE — 83970 ASSAY OF PARATHORMONE: CPT

## 2020-01-21 PROCEDURE — 85007 BL SMEAR W/DIFF WBC COUNT: CPT

## 2020-01-21 PROCEDURE — 82140 ASSAY OF AMMONIA: CPT

## 2020-01-21 NOTE — NUR
TRANSFER TO FLOOR:

Patient transferred to [facility] via gurney in stable condition accompanied by 1 rn 
 1tech as ordered, per dr. Duffy.   Report given to rn for [facility].  Belongings 
sent with patient.

## 2020-01-21 NOTE — EMERGENCY ROOM REPORT
History of Present Illness


General


Chief Complaint:  Abnormal Labs


Source:  Patient, EMS





Present Illness


HPI


Patient presents via EMS.  Apparently his blood sugar was 25.  He remembers 

feeling diaphoretic before he lost consciousness.  There was no trauma 

sustained.  Paramedics administered D10W and the patient woke up.  Repeat Accu-

Chek was better.  He states he did not eat well today.  Denies vomiting.





The patient was recently discharged from an assisted living facility.  He has a 

history of schizophrenia as well as COPD.  He does complain of a cough.  He has 

used inhalers in the past.  Cough without colored sputum.  No hemoptysis.  No 

SI or HI.





No fevers, chills, sore throat, chest pain, palpitations, nausea, diarrhea, 

dysuria, abdominal pain, joint pain, rashes, headache.





Last admitted February 2019.  D/C dx:


Bilateral lower extremity edema


Congestive heart failure diastolic dysfunctio  


Hypertension


Diabetes mellitus


Hyperlipidemia


Schizophrenia


Allergies:  


Coded Allergies:  


     No Known Allergies (Unverified , 1/16/18)





Patient History


Past Medical History:  see triage record


Social History:  Reports: smoking; Denies: alcohol use - denied in past, drug 

use - denied in past


Social History Narrative


at a home


Reviewed Nursing Documentation:  PMH: Agreed; PSxH: Agreed





Nursing Documentation-PMH


Past Medical History:  No History, Except For


Hx Cardiac Problems:  Yes


Hx Hypertension:  Yes


Hx Asthma:  Yes


Hx Diabetes:  Yes


Hx Gastrointestinal Problems:  Yes


Hx Neurological Problems:  No





Review of Systems


All Other Systems:  negative except mentioned in HPI





Physical Exam





Vital Signs








  Date Time  Temp Pulse Resp B/P (MAP) Pulse Ox O2 Delivery O2 Flow Rate FiO2


 


1/21/20 18:36 98.1 67      


 


1/21/20 18:36   18 130/68 (88) 99 Room Air  








Sp02 EP Interpretation:  reviewed, abnormal - interpreted as low by me 83%


General Appearance:  alert, GCS 15 - not remember paramedic arrival, 

Chronically Ill


Eyes:  bilateral eye PERRL, bilateral eye other - dysconjugate gaze


ENT:  moist mucus membranes


Neck:  full range of motion, supple


Respiratory:  decreased breath sounds, rhonchi, wheezing, expiration, 

inspiration


Cardiovascular #1:  regular rate, rhythm, no edema


Cardiovascular #2:  2+ radial (L)


Gastrointestinal:  non tender, decreased bowel sounds, overweight


Genitourinary:  no CVA tenderness


Musculoskeletal:  back normal, no calf tenderness


Neurologic:  alert, motor strength/tone normal, oriented - X2, DTRs symmetric, 

sensory intact, cerebellar normal, speech normal


Psychiatric:  depressed affect


Skin:  warm/dry, other - venous disease





Procedures


Critical Care Time


Critical Care Time


Total Critical Care Time: 60 min bedside evaluation and treatment excludes 

procedures (EKG).


Reason for critical care: hypoglycemia, repeat evaluations, hypoxia, elevated 

troponin, COPD - possible CHF


Possible complications: hypotension, hypertension, MI, shock, arrhythmias, 

metabolic acidosis, end organ damage, respiratory failure.


Interventions: oxygen, aspirin, breathing treatments, repeat evaluations, 

discussions with admitting MD, D10W and D50W, repeat glucose determinations


Course: Patient with hypoglycemia and hypoxia.  Glucose monitored closely.  

Hypoxia evaluated and treated.  Repeat evaluations with determinations of 

adequate oxygenation without CO2 retention.  Improvement.  Discussion with 

admitting MD.  Elevated troponin called and treated. Placed in higher level of 

care. Discussed a second time with admitting physician.


Consultations: nursing staff, EMS, respiratory therapist, admitting MD


Performed by: Dr. Puentes


Tolerated well condition = serious





Medical Decision Making


Diagnostic Impression:  


 Primary Impression:  


 Hypoglycemia


 Additional Impressions:  


 Hypoxia


 COPD exacerbation


 Schizophrenia


 Qualified Codes:  F20.9 - Schizophrenia, unspecified


 Elevated troponin


ER Course


Patient presents with 2 problems.  One is hyperglycemia on oral hypoglycemics.  

Concerned about current hypoglycemic episode.  Patient needs to have 

observation and ensure that hypoglycemic episodes are treated.  The second 

problem is history of COPD/CHF with a cough and hypoxia.  Differential includes 

includes acute myocardial infarction, pneumonia, bronchitis, exacerbation of 

COPD amongst others.  Patient evaluated with EKG, chest x-ray and labs.  

Repeated Accu-Cheks will be performed.  Treated with Solu-medrol and 

bronchodilators.





EKG normal sinus rhythm left atrial enlargement right axis with nonspecific ST-

T wave changes and prolonged QT.  Chest x-ray no infiltrates





Patient more alert and not hypoxemic on oxygen after breathing treatments.





Discussed patient with Dr. Duffy





2145.  D10 just begun.  Accu-Chek of 48.  D50 water ordered.





Called with + troponin.  Aspirin and nitro bid ordered.  Upgrade to SDU.  

Discussed with Dr. Duffy.





Repeat glucose 146.





Due to schizophrenia - consider discharge to higher level of care for 

monitoring of medications.





Laboratory Tests








Test


  1/21/20


19:00 1/21/20


19:10 1/21/20


21:15 1/21/20


22:15


 


White Blood Count


  8.8 K/UL


(4.8-10.8) 


  


  


 


 


Red Blood Count


  6.19 M/UL


(4.70-6.10)  H 


  


  


 


 


Hemoglobin


  16.4 G/DL


(14.2-18.0) 


  


  


 


 


Hematocrit


  51.8 %


(42.0-52.0) 


  


  


 


 


Mean Corpuscular Volume 84 FL (80-99)     


 


Mean Corpuscular Hemoglobin


  26.5 PG


(27.0-31.0)  L 


  


  


 


 


Mean Corpuscular Hemoglobin


Concent 31.6 G/DL


(32.0-36.0)  L 


  


  


 


 


Red Cell Distribution Width


  17.2 %


(11.6-14.8)  H 


  


  


 


 


Platelet Count


  172 K/UL


(150-450) 


  


  


 


 


Mean Platelet Volume


  9.9 FL


(6.5-10.1) 


  


  


 


 


Neutrophils (%) (Auto)


  70.2 %


(45.0-75.0) 


  


  


 


 


Lymphocytes (%) (Auto)


  10.9 %


(20.0-45.0)  L 


  


  


 


 


Monocytes (%) (Auto)


  16.6 %


(1.0-10.0)  H 


  


  


 


 


Eosinophils (%) (Auto)


  0.2 %


(0.0-3.0) 


  


  


 


 


Basophils (%) (Auto)


  2.0 %


(0.0-2.0) 


  


  


 


 


Prothrombin Time


  


  13.3 SEC


(9.30-11.50)  H 


  


 


 


Prothrombin Time INR


  


  1.3 (0.9-1.1)


H 


  


 


 


Activated Partial


Thromboplast Time 


  26 SEC (23-33)


  


  


 


 


Lactic Acid Level


  


  2.20 mmol/L


(0.4-2.0)  H 1.20 mmol/L


(0.66-2.22) 


 


 


Sodium Level


  


  


  


  131 MMOL/L


(136-145)  L


 


Potassium Level


  


  


  


  4.2 MMOL/L


(3.5-5.1)


 


Chloride Level


  


  


  


  92 MMOL/L


()  L


 


Carbon Dioxide Level


  


  


  


  35 MMOL/L


(21-32)  H


 


Anion Gap


  


  


  


  5 mmol/L


(5-15)


 


Blood Urea Nitrogen


  


  


  


  39 mg/dL


(7-18)  H


 


Creatinine


  


  


  


  1.0 MG/DL


(0.55-1.30)


 


Estimate Glomerular


Filtration Rate 


  


  


  > 60 mL/min


(>60)


 


Glucose Level


  


  


  


  36 MG/DL


()  *L


 


Calcium Level


  


  


  


  7.4 MG/DL


(8.5-10.1)  L


 


Magnesium Level


  


  


  


  1.6 MG/DL


(1.8-2.4)  L


 


Total Bilirubin


  


  


  


  1.5 MG/DL


(0.2-1.0)  H


 


Direct Bilirubin


  


  


  


  1.0 MG/DL


(0.0-0.3)  H


 


Aspartate Amino Transferase


(AST) 


  


  


  377 U/L


(15-37)  H


 


Alanine Aminotransferase (ALT)


  


  


  


  1182 U/L


(12-78)  H


 


Alkaline Phosphatase


  


  


  


  120 U/L


()  H


 


Total Creatine Kinase


  


  


  


  238 U/L


()


 


Troponin I


  


  


  


  1.058 ng/mL


(0.000-0.056)


 


Pro-B-Type Natriuretic Peptide


  


  


  


  1839 pg/mL


(0-125)  H


 


Total Protein


  


  


  


  5.8 G/DL


(6.4-8.2)  L


 


Albumin


  


  


  


  2.7 G/DL


(3.4-5.0)  L


 


Globulin    3.1 g/dL  


 


Albumin/Globulin Ratio


  


  


  


  0.9 (1.0-2.7)


L


 


Serum Alcohol    < 3 mg/dL  


 


Test


  1/21/20


23:35 


  


  


 


 


Urine Color Yellow     


 


Urine Appearance Clear     


 


Urine pH 6 (4.5-8.0)     


 


Urine Specific Gravity


  1.015


(1.005-1.035) 


  


  


 


 


Urine Protein


  Negative


(NEGATIVE) 


  


  


 


 


Urine Glucose (UA)


  Negative


(NEGATIVE) 


  


  


 


 


Urine Ketones


  Negative


(NEGATIVE) 


  


  


 


 


Urine Blood


  Negative


(NEGATIVE) 


  


  


 


 


Urine Nitrite


  Negative


(NEGATIVE) 


  


  


 


 


Urine Bilirubin


  Negative


(NEGATIVE) 


  


  


 


 


Urine Urobilinogen


  8 MG/DL


(0.0-1.0)  H 


  


  


 


 


Urine Leukocyte Esterase


  Negative


(NEGATIVE) 


  


  


 


 


Urine Opiates Screen


  Negative


(NEGATIVE) 


  


  


 


 


Urine Barbiturates Screen


  Negative


(NEGATIVE) 


  


  


 


 


Phencyclidine (PCP) Screen


  Negative


(NEGATIVE) 


  


  


 


 


Urine Amphetamines Screen


  Negative


(NEGATIVE) 


  


  


 


 


Urine Benzodiazepines Screen


  Negative


(NEGATIVE) 


  


  


 


 


Urine Cocaine Screen


  Negative


(NEGATIVE) 


  


  


 


 


Urine Marijuana (THC) Screen


  Positive


(NEGATIVE)  H 


  


  


 








EKG Diagnostic Results


Rate:  normal


Rhythm:  NSR


ST Segments:  no acute changes - Left atrial enlargement nonspecific ST-T wave 

changes prolonged QT of 478 ms





Rhythm Strip Diag. Results


EP Interpretation:  yes


Rhythm:  NSR, no PVC's, no ectopy





Chest X-Ray Diagnostic Results


Chest X-Ray Diagnostic Results :  


   Chest X-Ray Ordered:  Yes


   # of Views/Limited/Complete:  1 View


   Indication:  Shortness of Breath


   EP Interpretation:  Yes


   Interpretation:  no consolidation, no effusion, no pneumothorax, other - COPD


   Impression:  Other


   Electronically Signed by:  Electronically signed by Franklin Puentes MD





Last Vital Signs








  Date Time  Temp Pulse Resp B/P (MAP) Pulse Ox O2 Delivery O2 Flow Rate FiO2


 


1/22/20 04:00      Nasal Cannula 3.0 


 


1/22/20 03:48 96.8 75 20 114/75 (88) 96   








Status:  improved


Disposition:  ADMITTED AS INPATIENT


Condition:  Serious











Franklin Puentes MD Jan 21, 2020 19:02

## 2020-01-22 VITALS — SYSTOLIC BLOOD PRESSURE: 126 MMHG | DIASTOLIC BLOOD PRESSURE: 85 MMHG

## 2020-01-22 VITALS — SYSTOLIC BLOOD PRESSURE: 130 MMHG | DIASTOLIC BLOOD PRESSURE: 80 MMHG

## 2020-01-22 VITALS — SYSTOLIC BLOOD PRESSURE: 93 MMHG | DIASTOLIC BLOOD PRESSURE: 58 MMHG

## 2020-01-22 VITALS — DIASTOLIC BLOOD PRESSURE: 75 MMHG | SYSTOLIC BLOOD PRESSURE: 114 MMHG

## 2020-01-22 VITALS — DIASTOLIC BLOOD PRESSURE: 75 MMHG | SYSTOLIC BLOOD PRESSURE: 128 MMHG

## 2020-01-22 VITALS — DIASTOLIC BLOOD PRESSURE: 71 MMHG | SYSTOLIC BLOOD PRESSURE: 120 MMHG

## 2020-01-22 LAB
ADD MANUAL DIFF: NO
ALBUMIN SERPL-MCNC: 2.7 G/DL (ref 3.4–5)
ALBUMIN/GLOB SERPL: 0.8 {RATIO} (ref 1–2.7)
ALP SERPL-CCNC: 107 U/L (ref 46–116)
ALT SERPL-CCNC: 1102 U/L (ref 12–78)
ANION GAP SERPL CALC-SCNC: 2 MMOL/L (ref 5–15)
APTT PPP: YELLOW S
AST SERPL-CCNC: 321 U/L (ref 15–37)
BASOPHILS NFR BLD AUTO: 2.2 % (ref 0–2)
BILIRUB DIRECT SERPL-MCNC: 0.9 MG/DL (ref 0–0.3)
BILIRUB SERPL-MCNC: 1.3 MG/DL (ref 0.2–1)
BUN SERPL-MCNC: 32 MG/DL (ref 7–18)
CALCIUM SERPL-MCNC: 7.4 MG/DL (ref 8.5–10.1)
CHLORIDE SERPL-SCNC: 93 MMOL/L (ref 98–107)
CHOLEST SERPL-MCNC: 70 MG/DL (ref ?–200)
CO2 SERPL-SCNC: 36 MMOL/L (ref 21–32)
CREAT SERPL-MCNC: 0.8 MG/DL (ref 0.55–1.3)
EOSINOPHIL NFR BLD AUTO: 0 % (ref 0–3)
ERYTHROCYTE [DISTWIDTH] IN BLOOD BY AUTOMATED COUNT: 16.9 % (ref 11.6–14.8)
GLOBULIN SER-MCNC: 3.2 G/DL
HCT VFR BLD CALC: 49.7 % (ref 42–52)
HDLC SERPL-MCNC: 21 MG/DL (ref 40–60)
HGB BLD-MCNC: 15.6 G/DL (ref 14.2–18)
LYMPHOCYTES NFR BLD AUTO: 15.2 % (ref 20–45)
MCV RBC AUTO: 84 FL (ref 80–99)
MONOCYTES NFR BLD AUTO: 15.9 % (ref 1–10)
NEUTROPHILS NFR BLD AUTO: 66.7 % (ref 45–75)
PLATELET # BLD: 156 K/UL (ref 150–450)
POTASSIUM SERPL-SCNC: 4.7 MMOL/L (ref 3.5–5.1)
PROT UR QL STRIP: NEGATIVE
RBC # BLD AUTO: 5.92 M/UL (ref 4.7–6.1)
SODIUM SERPL-SCNC: 131 MMOL/L (ref 136–145)
TRIGL SERPL-MCNC: 41 MG/DL (ref 30–150)
WBC # BLD AUTO: 9.3 K/UL (ref 4.8–10.8)

## 2020-01-22 RX ADMIN — ASPIRIN 81 MG SCH MG: 81 TABLET ORAL at 08:39

## 2020-01-22 RX ADMIN — LISINOPRIL SCH MG: 2.5 TABLET ORAL at 08:39

## 2020-01-22 RX ADMIN — HUMAN INSULIN SCH MLS/HR: 100 INJECTION, SOLUTION SUBCUTANEOUS at 23:21

## 2020-01-22 RX ADMIN — METHYLPREDNISOLONE SODIUM SUCCINATE SCH MG: 40 INJECTION, POWDER, LYOPHILIZED, FOR SOLUTION INTRAMUSCULAR; INTRAVENOUS at 14:26

## 2020-01-22 RX ADMIN — METHYLPREDNISOLONE SODIUM SUCCINATE SCH MG: 40 INJECTION, POWDER, LYOPHILIZED, FOR SOLUTION INTRAMUSCULAR; INTRAVENOUS at 23:21

## 2020-01-22 RX ADMIN — HUMAN INSULIN SCH MLS/HR: 100 INJECTION, SOLUTION SUBCUTANEOUS at 02:18

## 2020-01-22 RX ADMIN — HUMAN INSULIN SCH MLS/HR: 100 INJECTION, SOLUTION SUBCUTANEOUS at 14:22

## 2020-01-22 RX ADMIN — HUMAN INSULIN SCH MLS/HR: 100 INJECTION, SOLUTION SUBCUTANEOUS at 21:12

## 2020-01-22 NOTE — NUR
HAND-OFF: 

Report given to MILADIS Nunez- pt. remains stable and no sigsn of distress noted- nurse 
aware to f/u with dcotor regarding D10W.

## 2020-01-22 NOTE — NUR
NURSE NOTES:

Received orders from DR. Duffy, regular diet- no modifications, D10W 25mls/hr, Troponin 
x's3, SCDs, Venous duplex, DuoNeb Q6hrs PRN SOB, med reconcile discussed with doctor- okay 
to continue the following meds as ordered, Atorvastatin, Trazodone, Omeprazole, Gemfibrozil, 
Risperidone, Quetiapine, Furosemide, Aspirin, and Lisinopril.  Per doctor to discontinue the 
following medications-Naproxen, Spiriva, Potassium chloride, Levalbuterol, Vitamin B-1, 
Fluticasone, and Glyburide- orders carried out and read back to doctor.

## 2020-01-22 NOTE — DIAGNOSTIC IMAGING REPORT
Indication: Chest pain

 

Technique: Continuous helical transaxial imaging of the chest was obtained from the

thoracic inlet to the upper abdomen during rapid intravenous contrast administration.

Arterial phase of enhancement obtained. Coronal 2-D reformats were also obtained and

maximum intensity projection images in multiple planes. Study obtained in a Siemens

sensation 64 slice CT. Automatic Exposure Control was utilized.

 

 

Total Dose length Product (DLP):  1178 mGycm

 

CT Dose Index Volume (CTDIvol):   170 mGy

 

Comparison: None

 

Findings: The pulmonary artery is well opacified and shows no filling defects. There

is no adenopathy, pleural or pericardial effusions are identified. There is no aortic

dissection or aneurysm identified within the chest. There are reticular densities at

the lung bases likely atelectasis. Some areas of hyperlucency may be present in the

upper lobes suggestive of emphysema/COPD. Correlate clinically. The heart is

enlarged. Visualized part of the upper abdomen demonstrates innumerable gallstones

within the gallbladder lumen..

 

IMPRESSION:

 

No evidence of pulmonary embolus, aortic dissection or aneurysm.

 

Basal atelectasis.

 

Suggestion of COPD

 

Cholelithiasis.

 

Cardiomegaly

 

The CT scanner at [facility] is accredited by the [employer] College of

Radiology and the scans are performed using dose optimization techniques as

appropriate to a performed exam including Automatic Exposure control.

## 2020-01-22 NOTE — NUR
NURSE NOTES:

Received report from MILADIS Nunez. Patient in bed resting. No signs of distress. On 12L O2 
via Venturi mask FiO2 50%. A/O x 3/4. Right hand 20 gauge running D5 NS at 100 cc/hr. IV is 
intact and patent. R AC 20 gauge, patent, intact. No signs of hypoglycemia at this time. 
Safety precautions in place. HOB elevated, bed at lowest position, locked. Call light within 
reach. Will continue to monitor patient.

## 2020-01-22 NOTE — NUR
NURSE NOTES:

During rounds assessed pt.- Venturi mask at bedside- pt. sating at 88%- pt. put back on 
Venturi mask as ordered settings- pt. appears to be resting comfortably- arousable to name- 
pts pulse ox trending up to 90%- will continue to monitor pt.- BS retaken 275.

## 2020-01-22 NOTE — NUR
NURSE NOTES:

re-checked blood sugar now 136- pt. is A/O x's3-4 to name, , and place- will notify 
doctor of results.

## 2020-01-22 NOTE — DIAGNOSTIC IMAGING REPORT
Indication:  Abdominal pain

 

Technique: Grayscale and duplex Doppler imaging of the abdomen performed.

 

Comparison: None

 

Findings:

 

The liver is enlarged measuring 21 cm. The spleen is enlarged measuring 14 cm. There

is trace ascites. There is pulsatility of the Doppler tracing on the main portal vein

which is a finding somewhat suggestive of portal hypertension. Please correlate

clinically. The CBD measures 4 mm in diameter.  There is no biliary ductal dilatation

identified. Gallbladder is full of stones with dense shadowing.

 

There demonstrated part of the pancreas, aorta and IVC show no definite

abnormalities.

 

Both kidneys appear unremarkable. There is no hydronephrosis.

 

IMPRESSION:

 

Cholelithiasis.

 

Hepatosplenomegaly.

 

Somewhat pulsatile main portal vein Doppler tracing. Some degree of portal

hypertension not excluded.

 

Trace ascites

## 2020-01-22 NOTE — NUR
NURSE NOTES:

Notified DR. Duffy regarding pt. appearing to be restless BS 44- orange juice given as pt. 
was able to tolerate- re-checked then its was 38- Dextrose 50 was ordered and BS re-checked 
at 0545 is 136- no new orders given by doctor- will continue to monitor patient.

## 2020-01-22 NOTE — NUR
NURSE NOTES:

reassessed pt.- he appears to be resting comfortably in bed-a waiting for breakfast, no 
signs of hypoglycemia noted- restless, shaking, irritability- pt. is A/O x's- 3-4- will 
continue to monitor pt. and with plan of care.

## 2020-01-22 NOTE — NUR
NURSE NOTES: Pt keeps trying to stand up, but is very unsteady. Pt also continues to remove 
his venturi mask. Pt needs to be reoriented constantly about the importance of oxygen and 
using the call light. Pt moved to closer to the nursing station

## 2020-01-22 NOTE — NUR
NURSE NOTES: Made Dr. Duffy aware @ bedside that pt desaturates quickly to 80% on nasal 
cannula. No new orders given.

## 2020-01-22 NOTE — NUR
NURSE NOTES: Report received from MILADIS Cardona. Pt in stable condition at this time. A+Ox4,  
denies pain/SOB. Respirations even and unlabored on venturi mask. IV site patent and intact, 
running fluids @ prescribed rate. Pt is to have 2Decho and venous duplex today. Pt shows no 
signs of distress. Bed at lowest position, brakes engaged, siderails x3, bed alarm on, and 
call light within reach.

## 2020-01-22 NOTE — NUR
NURSE NOTES: RT and RN attempted to ween pt off venturi mask and onto 4 L NC. Pt @ 94% for 
about 30 minutes when he started to desaturate to 82%. RT called and pt put back on venturi 
mask @ 55%. Pt saturation 95%.

## 2020-01-22 NOTE — GENERAL PROGRESS NOTE
Assessment/Plan


Problem List:  


(1) Diabetes mellitus


ICD Codes:  E11.9 - Type 2 diabetes mellitus without complications


SNOMED:  90926850


(2) Hypoglycemia


ICD Codes:  E16.2 - Hypoglycemia, unspecified


SNOMED:  078919599


(3) Schizophrenia


ICD Codes:  F20.9 - Schizophrenia, unspecified


SNOMED:  26765353


Qualifiers:  


   Qualified Codes:  F20.9 - Schizophrenia, unspecified


(4) Elevated troponin


ICD Codes:  R79.89 - Other specified abnormal findings of blood chemistry


SNOMED:  564674913, 900184014, 102177596


(5) COPD exacerbation


ICD Codes:  J44.1 - Chronic obstructive pulmonary disease with (acute) 

exacerbation


SNOMED:  571748367


(6) Abnormal LFTs


ICD Codes:  R94.5 - Abnormal results of liver function studies


SNOMED:  148465789


Assessment/Plan:


continue glucose monitoring without insulin coverage 


dextrose as needed 


hypoglycemia protocol in order 


GI consulted





Subjective


Allergies:  


Coded Allergies:  


     No Known Allergies (Unverified , 1/16/18)


All Systems:  reviewed and negative except above


Subjective


patient admitted with hypoglycemic encephalopathy 


hx of DM treated with Glyburide as OP 


resuscitated with D10 - tolerating diet 


liver enzymes are elevated 











Item Value  Date Time


 


Bedside Blood Glucose 150 mg/dl H 1/22/20 1630


 


Bedside Blood Glucose 89 mg/dl 1/22/20 1130


 


Bedside Blood Glucose 136 mg/dl H 1/22/20 0552


 


Bedside Blood Glucose 146 mg/dl H 1/21/20 2238


 


Bedside Blood Glucose 82 mg/dl 1/21/20 1851











Objective





Last 24 Hour Vital Signs








  Date Time  Temp Pulse Resp B/P (MAP) Pulse Ox O2 Delivery O2 Flow Rate FiO2


 


1/22/20 16:00 97.2 81 20 128/75 (92) 81   


 


1/22/20 16:00       12.0 50


 


1/22/20 16:00      Venturi Mask 12.0 


 


1/22/20 16:00  81      


 


1/22/20 12:00  75      


 


1/22/20 12:00 96.8 74 20 126/85 (99) 96   


 


1/22/20 12:00      Venturi Mask 12.0 


 


1/22/20 12:00       12.0 50


 


1/22/20 09:51  77 20  96 Venturi Mask 10.0 45


 


1/22/20 09:51  77 18  98 Venturi Mask 10.0 45





  77 20  96   


 


1/22/20 08:39    120/71    


 


1/22/20 08:00 96.5 78 20 120/71 (87) 93   


 


1/22/20 08:00  78      


 


1/22/20 08:00      Venturi Mask 12.0 


 


1/22/20 08:00       14.0 55


 


1/22/20 08:00     94 Nasal Cannula 4.0 36


 


1/22/20 04:00      Nasal Cannula 3.0 


 


1/22/20 04:00       3.0 


 


1/22/20 03:48 96.8 75 20 114/75 (88) 96   


 


1/22/20 03:25  84      


 


1/22/20 00:25  82      


 


1/22/20 00:10       3.0 


 


1/22/20 00:10      Nasal Cannula 3.0 


 


1/22/20 00:10 97.2 90 20 93/58 (70) 93   


 


1/22/20 00:10      Nasal Cannula 3.0 


 


1/21/20 23:51 98.7 81 20 104/68 89 Nasal Cannula 2.0 


 


1/21/20 23:45 98.5 91 18 112/77 93 Nasal Cannula 2.0 


 


1/21/20 23:14    108/75    


 


1/21/20 20:00 98.6 81 18 110/64 93 Nasal Cannula 2.0 


 


1/21/20 19:30 98.4 78 20 111/81 93 Room Air  

















Intake and Output  


 


 1/21/20 1/22/20





 19:00 07:00


 


Intake Total  574 ml


 


Output Total 0 ml 300 ml


 


Balance 0 ml 274 ml


 


  


 


Intake IV Total  574 ml


 


Output Urine Total 0 ml 300 ml








Laboratory Tests


1/21/20 19:00: 


White Blood Count 8.8, Red Blood Count 6.19H, Hemoglobin 16.4, Hematocrit 51.8, 

Mean Corpuscular Volume 84, Mean Corpuscular Hemoglobin 26.5L, Mean Corpuscular 

Hemoglobin Concent 31.6L, Red Cell Distribution Width 17.2H, Platelet Count 172

, Mean Platelet Volume 9.9, Neutrophils (%) (Auto) 70.2, Lymphocytes (%) (Auto) 

10.9L, Monocytes (%) (Auto) 16.6H, Eosinophils (%) (Auto) 0.2, Basophils (%) (

Auto) 2.0


1/21/20 19:10: 


Prothrombin Time 13.3H, Prothromb Time International Ratio 1.3H, Activated 

Partial Thromboplast Time 26, Lactic Acid Level 2.20H


1/21/20 21:15: Lactic Acid Level 1.20


1/21/20 22:15: 


Sodium Level 131L, Potassium Level 4.2, Chloride Level 92L, Carbon Dioxide 

Level 35H, Anion Gap 5, Blood Urea Nitrogen 39H, Creatinine 1.0, Estimat 

Glomerular Filtration Rate > 60, Glucose Level 36*L, Calcium Level 7.4L, 

Magnesium Level 1.6L, Total Bilirubin 1.5H, Direct Bilirubin 1.0H, Aspartate 

Amino Transf (AST/SGOT) 377H, Alanine Aminotransferase (ALT/SGPT) 1182H, 

Alkaline Phosphatase 120H, Total Creatine Kinase 238, Troponin I 1.058H, Pro-B-

Type Natriuretic Peptide 1839H, Total Protein 5.8L, Albumin 2.7L, Globulin 3.1, 

Albumin/Globulin Ratio 0.9L, Serum Alcohol < 3


1/21/20 23:35: 


Urine Color Yellow, Urine Appearance Clear, Urine pH 6, Urine Specific Gravity 

1.015, Urine Protein Negative, Urine Glucose (UA) Negative, Urine Ketones 

Negative, Urine Blood Negative, Urine Nitrite Negative, Urine Bilirubin Negative

, Urine Urobilinogen 8H, Urine Leukocyte Esterase Negative, Urine Opiates 

Screen Negative, Urine Barbiturates Screen Negative, Phencyclidine (PCP) Screen 

Negative, Urine Amphetamines Screen Negative, Urine Benzodiazepines Screen 

Negative, Urine Cocaine Screen Negative, Urine Marijuana (THC) Screen PositiveH


1/22/20 04:10: 


White Blood Count 9.3, Red Blood Count 5.92, Hemoglobin 15.6, Hematocrit 49.7, 

Mean Corpuscular Volume 84, Mean Corpuscular Hemoglobin 26.4L, Mean Corpuscular 

Hemoglobin Concent 31.4L, Red Cell Distribution Width 16.9H, Platelet Count 156

, Mean Platelet Volume 7.7, Neutrophils (%) (Auto) 66.7, Lymphocytes (%) (Auto) 

15.2L, Monocytes (%) (Auto) 15.9H, Eosinophils (%) (Auto) 0.0, Basophils (%) (

Auto) 2.2H, Sodium Level 131L, Potassium Level 4.7, Chloride Level 93L, Carbon 

Dioxide Level 36H, Anion Gap 2L, Blood Urea Nitrogen 32H, Creatinine 0.8, 

Estimat Glomerular Filtration Rate > 60, Glucose Level 23*L, Calcium Level 7.4L

, Magnesium Level 2.2, Total Bilirubin 1.3H, Direct Bilirubin 0.9H, Aspartate 

Amino Transf (AST/SGOT) 321H, Alanine Aminotransferase (ALT/SGPT) 1102H, 

Alkaline Phosphatase 107, Troponin I 0.997H, Pro-B-Type Natriuretic Peptide [

Pending], Total Protein 5.9L, Albumin 2.7L, Globulin 3.2, Albumin/Globulin 

Ratio 0.8L, Triglycerides Level 41, Cholesterol Level 70, LDL Cholesterol 45, 

HDL Cholesterol 21L, Cholesterol/HDL Ratio 3.3, Thyroid Stimulating Hormone (TSH

) 0.746


1/22/20 11:45: Troponin I 0.869H


Height (Feet):  6


Height (Inches):  3.00


Weight (Pounds):  246


General Appearance:  no apparent distress


Neck:  normal alignment


Cardiovascular:  normal rate


Respiratory/Chest:  lungs clear


Abdomen:  normal bowel sounds


Pelvis:  normal external exam


Objective





Current Medications








 Medications


  (Trade)  Dose


 Ordered  Sig/German


 Route


 PRN Reason  Start Time


 Stop Time Status Last Admin


Dose Admin


 


 Acetaminophen


  (Tylenol)  650 mg  Q6H  PRN


 ORAL


 Mild Pain/Temp > 100.5  1/22/20 18:00


 2/21/20 17:59   


 


 


 Albuterol/


 Ipratropium


  (Albuterol/


 Ipratropium)  3 ml  Q6HRT  PRN


 HHN


 Shortness of Breath  1/22/20 01:00


 1/27/20 00:59  1/22/20 09:50


 


 


 Aspirin


  (ASA)  81 mg  DAILY


 ORAL


   1/22/20 09:00


 2/21/20 08:59  1/22/20 08:39


 


 


 Ceftriaxone


 Sodium 1 gm/


 Dextrose  55 ml @ 


 110 mls/hr  Q24H


 IVPB


   1/22/20 16:00


 1/29/20 15:59  1/22/20 15:59


 


 


 Dextrose/Sodium


 Chloride  1,000 ml @ 


 100 mls/hr  Q10H


 IV


   1/22/20 02:15


 2/21/20 02:14  1/22/20 14:22


 


 


 Guaifenesin/


 Dextromethorphan


  (Robitussin DM


 Syrup)  5 ml  Q6H  PRN


 ORAL


 For Cough  1/22/20 18:00


 2/21/20 17:59   


 


 


 Iohexol


  (Omnipaque)  100 mg  NOW  PRN


 INJ


 Radiology Procedure  1/22/20 14:15


 1/24/20 14:05   


 


 


 Lisinopril


  (ZestriL)  2.5 mg  DAILY


 ORAL


   1/22/20 09:00


 2/21/20 08:59  1/22/20 08:39


 


 


 Methylprednisolone


 Sodium Succinate


  (Solu-MEDROL)  40 mg  EVERY 6  HOURS


 IVP


   1/22/20 15:00


 2/21/20 14:59  1/22/20 14:26


 


 


 Pantoprazole


  (Protonix)  40 mg  DAILY


 ORAL


   1/22/20 09:00


 2/21/20 08:59  1/22/20 08:39


 


 


 Quetiapine


 Fumarate


  (SEROqueL)  100 mg  QHS


 ORAL


   1/22/20 21:00


 2/21/20 20:59   


 


 


 Risperidone


  (RisperDAL)  2 mg  QHS


 ORAL


   1/22/20 21:00


 2/21/20 20:59   


 


 


 Trazodone HCl


  (Desyrel)  100 mg  BEDTIME


 ORAL


   1/22/20 21:00


 2/21/20 20:59   


 

















Nathen Joiner MD Jan 22, 2020 18:50

## 2020-01-22 NOTE — NUR
*-* INSURANCE *-*



ALL AVAILABLE CLINICALS AND REVIEWS  HAVE BEEN FAXED TO:



[facility]

[place]

P: [phone]

F: [phone]

REF# [id number]

## 2020-01-22 NOTE — NUR
NURSE NOTES:

Received report from MILADIS Fields- pt. transferred from ER- pt. appears to be A/O x's3-4- no 
signs or symptoms of acute cardiac or respiratory distress noted, bed alarm on, side rails 
up x's3 and safety brakes engaged, VS taken, BS 78- bed side teaching done and pt. oriented 
to room- pt. aware not to walk out of bed as his gait is unsteady. pt. appears to be resting 
comfortably - and no hypoglycemia symptoms noted- upon assessment, Full body assessment 
done- redness to buttocks area- blanchable and left lower leg appears to have a scratch, 
otherwise skin intact but dry, Rt. hand 20G IV intact and patent, safety measures continued, 
will continue with plan of care.  Will contact primary doctor for orders.

## 2020-01-22 NOTE — DIAGNOSTIC IMAGING REPORT
Indication: Dyspnea

 

Comparison:  2/20/2019

 

A single view chest radiograph was obtained.

 

Findings:

 

Pulmonary vascularity is mildly prominent. The heart is enlarged. Bones are

osteopenic. No pleural effusion seen. No change appreciated.

 

IMPRESSION:

 

Query mild pulmonary vascular congestion. No change

## 2020-01-22 NOTE — NUR
NURSE NOTES:

during rounds noted pt. to be very agitated and restless in bed- blood sugar checked at 44- 
1 cup of orange juice given- will continue to monitor pt. and follow up with doctor.

## 2020-01-22 NOTE — NUR
NURSE NOTES:

pt. continues to be restless BS checked now 38- ordered Dextrose 50-will administer after 
re-checking BS with doctor.  Will continue to monitor pt. and with plan of care.

## 2020-01-22 NOTE — NUR
NURSE NOTES:

Pasquale calling form lab that pts Glucose is 23- made him aware that pt. has been covered with 
dextrose and is stable.

## 2020-01-22 NOTE — NUR
CASE MANAGEMENT:INITIAL REVIEW



49 YR OLD MALE BIBA FROM HOME



CC;ABNORMAL LABS



SI;HYPOGLYCEMIA, ELEVATED TROPONIN, COPD EXACERBATION, HYPOXIA

98.1   67   18    130/68   99% ON RA

RBC 6.19      CO2 35   BG 36   CA 7.4  T BILI 1.5  

   ALT 1182   ALB 2.7   BNP 1839   TROP 1.058



IS;ALBUTEROL HHN X1

IPRATROPIUM BROMIDE HHN X1

IVF NS @ 300 ML/HR



*****ADMITTED TO STEP DOWN UNIT @ 00:45*****



DCP;FROM HOME

## 2020-01-22 NOTE — GI INITIAL CONSULT NOTE
History of Present Illness


General


Date patient seen:  Jan 22, 2020


Time patient seen:  14:25


Reason for Hospitalization:  Abnormal Labs


Referring physician:  ADELA CRUZ


Reason for Consultation:  ELEVATED LFTs





Present Illness


HPI


Patient presents via EMS.  Apparently his blood sugar was 25.  He remembers 

feeling diaphoretic before he lost consciousness.  There was no trauma 

sustained.  Paramedics administered D10W and the patient woke up.  Repeat Accu-

Chek was better.  He states he did not eat well today.  Denies vomiting.


The patient was recently discharged from an assisted living facility.  He has a 

history of schizophrenia as well as COPD.  He does complain of a cough.  He has 

used inhalers in the past.  Cough without colored sputum.  No hemoptysis.  No 

SI or HI.


No fevers, chills, sore throat, chest pain, palpitations, nausea, diarrhea, 

dysuria, abdominal pain, joint pain, rashes, headache.


GI consulted for abnormal liver function tests.  Patient seen, awake alert 

oriented x4 no apparent distress.  No active signs or symptoms of any nausea 

vomiting.,  Evaluation, the patient denied any abdominal pain.  Denied any 

current nausea vomiting patient or diarrhea.  The patient is unsure of any 

history of endoscopic or colonoscopy.  Laboratory review; patient present with 

total bilirubin level 1.5, AST of 321, ALT of 1102, troponin level of 0.869.


Home Meds


Active Scripts


Quetiapine Fumarate* (SEROQUEL*) 100 Mg Tablet, 100 MG ORAL BEDTIME for 30 Days

, TAB


   Prov:Harrison Plummer MD         1/22/18


Reported Medications


Omeprazole (OMEPRAZOLE) 20 Mg Capsule., 20 MG ORAL DAILY for Acid reflux, CAP


   1/21/20


Fluticasone/Salmeterol (Advair 250-50 Diskus) 1 Each Blst.w.dev, 1 PUFF INH 

TWICE A DAY for Asthma, EA


   1/21/20


Lisinopril (LISINOPRIL*) 5 Mg Tablet, 2.5 MG ORAL DAILY for HTN, TAB


   1/21/20


Aspirin* (ASPIR 81*) 81 Mg Tablet., 81 MG ORAL DAILY for blood thinner, TAB


   1/21/20


Thiamine Hcl* (VITAMIN B-1*) 100 Mg Tablet, 100 MG ORAL DAILY for supplement, #

30 TAB 0 Refills


   1/21/20


Furosemide* (LASIX*) 40 Mg Tablet, 40 MG ORAL DAILY for CHF, TAB


   1/21/20


Levalbuterol Tartrate (XOPENEX HFA) 15 Gm Hfa.aer.ad, 0.45 GM IH FIVE TIMES A 

DAY for SOB


   1/21/20


Tiotropium Bromide* (SPIRIVA*) 18 Mcg Cap.w.dev, 1 PUFF INH DAILY for SOB, EA


   1/21/20


Atorvastatin Calcium* (ATORVASTATIN CALCIUM*) 40 Mg Tablet, 40 MG ORAL BEDTIME 

for HLD, TAB


   1/21/20


Potassium Chloride (POTASSIUM CHLORIDE) 8 Meq Tablet.er, 8 MEQ PO BID, TAB


   2/20/19


Trazodone Hcl* (DESYREL*) 100 Mg Tablet, 100 MG ORAL BEDTIME, TAB


   2/20/19


Risperidone (RISPERIDONE) 2 Mg Tablet, 2 MG ORAL, TAB 0 Refills


   2/20/19


Pantoprazole* (PANTOPRAZOLE*) 40 Mg Tablet.dr, 40 MG ORAL DAILY, TAB


   2/20/19


Gemfibrozil (GEMFIBROZIL*) 600 Mg Tablet, 600 MG ORAL, TAB 0 Refills


   2/20/19


Levalbuterol Tartrate (LEVALBUTEROL TARTRATE HFA) 15 Gm Hfa.aer.ad, 45 MCG IH 

QID for Shortness of Breath


   1/16/18


Glyburide (GLYBURIDE) 5 Mg Tablet, 5 MG PO BID, TAB


   1/16/18


Discontinued Reported Medications


Naproxen Sodium (NAPROXEN SODIUM) 275 Mg Tablet, 375 MG ORAL TWICE A DAY, TAB


   1/16/18


Tramadol Hcl* (ULTRAM*) 50 Mg Tablet, 50 MG ORAL Q6H PRN for For Pain, #30 TAB 

0 Refills


   1/16/18


Blood Sugar Diagnostic (FREESTYLE LITE TEST STRIPS) 1 Each Strip, 1 EACH MC, 

STRIP


   1/16/18


Discontinued Scripts


Albuterol Sulfate* (ALBUTEROL SULFATE MDI*) 8.5 Gm Hfa.aer.ad, 2 PUFF INH Q3H, #

1 INH 0 Refills


   Prov:Astrid Edmonds         2/9/19


Diphenhydramine Hcl (BENADRYL ALLERGY) 25 Mg Tablet, 25 MG PO Q6HR for ITCHING, 

#10 TAB


   Prov:Astrid Edmonds         2/9/19


Hydrocortisone (Hydrocortisone Cream 2.5%) Y Cream.appl, 1 APPLIC TP BID, #22 GM


   Prov:Astrid Edmonds         2/9/19


Cephalexin* (KEFLEX*) 500 Mg Capsule, 500 MG ORAL EVERY 12 HOURS for 7 Days, #

14 CAP 0 Refills


   Prov:Astrid Edmonds         2/9/19


Mupirocin* (MUPIROCIN*) 22 Gm Oint...g., 1 APPLIC TOPIC THREE TIMES A DAY, #22 

GM


   Prov:Astrid Edmonds         2/9/19


Quetiapine Fumarate* (SEROQUEL*) 25 Mg Tablet, 25 MG ORAL Q4H PRN for 30 Days, 

TAB


   Prov:Harrison Plummer MD         1/22/18


Furosemide* (LASIX*) 40 Mg Tablet, 20 MG ORAL DAILY for 30 Days, TAB


   Prov:Harrison Plummer MD         1/22/18


Med list reviewed/reconciled:  Yes


Allergies:  


Coded Allergies:  


     No Known Allergies (Unverified , 1/16/18)





Patient History


Limited by:  medical condition


History Provided By:  Patient, Medical Record


[facility] Narrative


Last admitted February 2019.  D/C dx:


Bilateral lower extremity edema


Congestive heart failure diastolic dysfunctio  


Hypertension


Diabetes mellitus


Hyperlipidemia


Schizophrenia


Social History:  Reports: drug use - MJ





Review of Systems


All Other Systems:  negative except mentioned in HPI





Physical Exam





Vital Signs








  Date Time  Temp Pulse Resp B/P (MAP) Pulse Ox O2 Delivery O2 Flow Rate FiO2


 


1/21/20 18:36 98.1 67      


 


1/21/20 18:36   18 130/68 (88) 99 Room Air  


 


1/21/20 20:00       2.0 


 


1/22/20 08:00        36








Sp02 EP Interpretation:  reviewed, normal


Labs





Laboratory Tests








Test


  1/21/20


19:00 1/21/20


19:10 1/21/20


21:15 1/21/20


22:15


 


White Blood Count


  8.8 K/UL


(4.8-10.8) 


  


  


 


 


Red Blood Count


  6.19 M/UL


(4.70-6.10)  H 


  


  


 


 


Hemoglobin


  16.4 G/DL


(14.2-18.0) 


  


  


 


 


Hematocrit


  51.8 %


(42.0-52.0) 


  


  


 


 


Mean Corpuscular Volume 84 FL (80-99)     


 


Mean Corpuscular Hemoglobin


  26.5 PG


(27.0-31.0)  L 


  


  


 


 


Mean Corpuscular Hemoglobin


Concent 31.6 G/DL


(32.0-36.0)  L 


  


  


 


 


Red Cell Distribution Width


  17.2 %


(11.6-14.8)  H 


  


  


 


 


Platelet Count


  172 K/UL


(150-450) 


  


  


 


 


Mean Platelet Volume


  9.9 FL


(6.5-10.1) 


  


  


 


 


Neutrophils (%) (Auto)


  70.2 %


(45.0-75.0) 


  


  


 


 


Lymphocytes (%) (Auto)


  10.9 %


(20.0-45.0)  L 


  


  


 


 


Monocytes (%) (Auto)


  16.6 %


(1.0-10.0)  H 


  


  


 


 


Eosinophils (%) (Auto)


  0.2 %


(0.0-3.0) 


  


  


 


 


Basophils (%) (Auto)


  2.0 %


(0.0-2.0) 


  


  


 


 


Prothrombin Time


  


  13.3 SEC


(9.30-11.50)  H 


  


 


 


Prothromb Time International


Ratio 


  1.3 (0.9-1.1)


H 


  


 


 


Activated Partial


Thromboplast Time 


  26 SEC (23-33)


  


  


 


 


Lactic Acid Level


  


  2.20 mmol/L


(0.4-2.0)  H 1.20 mmol/L


(0.66-2.22) 


 


 


Sodium Level


  


  


  


  131 MMOL/L


(136-145)  L


 


Potassium Level


  


  


  


  4.2 MMOL/L


(3.5-5.1)


 


Chloride Level


  


  


  


  92 MMOL/L


()  L


 


Carbon Dioxide Level


  


  


  


  35 MMOL/L


(21-32)  H


 


Anion Gap


  


  


  


  5 mmol/L


(5-15)


 


Blood Urea Nitrogen


  


  


  


  39 mg/dL


(7-18)  H


 


Creatinine


  


  


  


  1.0 MG/DL


(0.55-1.30)


 


Estimat Glomerular Filtration


Rate 


  


  


  > 60 mL/min


(>60)


 


Glucose Level


  


  


  


  36 MG/DL


()  *L


 


Calcium Level


  


  


  


  7.4 MG/DL


(8.5-10.1)  L


 


Magnesium Level


  


  


  


  1.6 MG/DL


(1.8-2.4)  L


 


Total Bilirubin


  


  


  


  1.5 MG/DL


(0.2-1.0)  H


 


Direct Bilirubin


  


  


  


  1.0 MG/DL


(0.0-0.3)  H


 


Aspartate Amino Transf


(AST/SGOT) 


  


  


  377 U/L


(15-37)  H


 


Alanine Aminotransferase


(ALT/SGPT) 


  


  


  1182 U/L


(12-78)  H


 


Alkaline Phosphatase


  


  


  


  120 U/L


()  H


 


Total Creatine Kinase


  


  


  


  238 U/L


()


 


Troponin I


  


  


  


  1.058 ng/mL


(0.000-0.056)


 


Pro-B-Type Natriuretic Peptide


  


  


  


  1839 pg/mL


(0-125)  H


 


Total Protein


  


  


  


  5.8 G/DL


(6.4-8.2)  L


 


Albumin


  


  


  


  2.7 G/DL


(3.4-5.0)  L


 


Globulin    3.1 g/dL  


 


Albumin/Globulin Ratio


  


  


  


  0.9 (1.0-2.7)


L


 


Serum Alcohol    < 3 mg/dL  


 


Test


  1/21/20


23:35 1/22/20


04:10 1/22/20


11:45 


 


 


Urine Color Yellow     


 


Urine Appearance Clear     


 


Urine pH 6 (4.5-8.0)     


 


Urine Specific Gravity


  1.015


(1.005-1.035) 


  


  


 


 


Urine Protein


  Negative


(NEGATIVE) 


  


  


 


 


Urine Glucose (UA)


  Negative


(NEGATIVE) 


  


  


 


 


Urine Ketones


  Negative


(NEGATIVE) 


  


  


 


 


Urine Blood


  Negative


(NEGATIVE) 


  


  


 


 


Urine Nitrite


  Negative


(NEGATIVE) 


  


  


 


 


Urine Bilirubin


  Negative


(NEGATIVE) 


  


  


 


 


Urine Urobilinogen


  8 MG/DL


(0.0-1.0)  H 


  


  


 


 


Urine Leukocyte Esterase


  Negative


(NEGATIVE) 


  


  


 


 


Urine Opiates Screen


  Negative


(NEGATIVE) 


  


  


 


 


Urine Barbiturates Screen


  Negative


(NEGATIVE) 


  


  


 


 


Phencyclidine (PCP) Screen


  Negative


(NEGATIVE) 


  


  


 


 


Urine Amphetamines Screen


  Negative


(NEGATIVE) 


  


  


 


 


Urine Benzodiazepines Screen


  Negative


(NEGATIVE) 


  


  


 


 


Urine Cocaine Screen


  Negative


(NEGATIVE) 


  


  


 


 


Urine Marijuana (THC) Screen


  Positive


(NEGATIVE)  H 


  


  


 


 


White Blood Count


  


  9.3 K/UL


(4.8-10.8) 


  


 


 


Red Blood Count


  


  5.92 M/UL


(4.70-6.10) 


  


 


 


Hemoglobin


  


  15.6 G/DL


(14.2-18.0) 


  


 


 


Hematocrit


  


  49.7 %


(42.0-52.0) 


  


 


 


Mean Corpuscular Volume  84 FL (80-99)    


 


Mean Corpuscular Hemoglobin


  


  26.4 PG


(27.0-31.0)  L 


  


 


 


Mean Corpuscular Hemoglobin


Concent 


  31.4 G/DL


(32.0-36.0)  L 


  


 


 


Red Cell Distribution Width


  


  16.9 %


(11.6-14.8)  H 


  


 


 


Platelet Count


  


  156 K/UL


(150-450) 


  


 


 


Mean Platelet Volume


  


  7.7 FL


(6.5-10.1) 


  


 


 


Neutrophils (%) (Auto)


  


  66.7 %


(45.0-75.0) 


  


 


 


Lymphocytes (%) (Auto)


  


  15.2 %


(20.0-45.0)  L 


  


 


 


Monocytes (%) (Auto)


  


  15.9 %


(1.0-10.0)  H 


  


 


 


Eosinophils (%) (Auto)


  


  0.0 %


(0.0-3.0) 


  


 


 


Basophils (%) (Auto)


  


  2.2 %


(0.0-2.0)  H 


  


 


 


Sodium Level


  


  131 MMOL/L


(136-145)  L 


  


 


 


Potassium Level


  


  4.7 MMOL/L


(3.5-5.1) 


  


 


 


Chloride Level


  


  93 MMOL/L


()  L 


  


 


 


Carbon Dioxide Level


  


  36 MMOL/L


(21-32)  H 


  


 


 


Anion Gap


  


  2 mmol/L


(5-15)  L 


  


 


 


Blood Urea Nitrogen


  


  32 mg/dL


(7-18)  H 


  


 


 


Creatinine


  


  0.8 MG/DL


(0.55-1.30) 


  


 


 


Estimat Glomerular Filtration


Rate 


  > 60 mL/min


(>60) 


  


 


 


Glucose Level


  


  23 MG/DL


()  *L 


  


 


 


Calcium Level


  


  7.4 MG/DL


(8.5-10.1)  L 


  


 


 


Magnesium Level


  


  2.2 MG/DL


(1.8-2.4) 


  


 


 


Total Bilirubin


  


  1.3 MG/DL


(0.2-1.0)  H 


  


 


 


Direct Bilirubin


  


  0.9 MG/DL


(0.0-0.3)  H 


  


 


 


Aspartate Amino Transf


(AST/SGOT) 


  321 U/L


(15-37)  H 


  


 


 


Alanine Aminotransferase


(ALT/SGPT) 


  1102 U/L


(12-78)  H 


  


 


 


Alkaline Phosphatase


  


  107 U/L


() 


  


 


 


Troponin I


  


  0.997 ng/mL


(0.000-0.056) 0.869 ng/mL


(0.000-0.056) 


 


 


Pro-B-Type Natriuretic Peptide  Pending    


 


Total Protein


  


  5.9 G/DL


(6.4-8.2)  L 


  


 


 


Albumin


  


  2.7 G/DL


(3.4-5.0)  L 


  


 


 


Globulin  3.2 g/dL    


 


Albumin/Globulin Ratio


  


  0.8 (1.0-2.7)


L 


  


 


 


Triglycerides Level


  


  41 MG/DL


() 


  


 


 


Cholesterol Level


  


  70 MG/DL (<


200) 


  


 


 


LDL Cholesterol


  


  45 mg/dL


(<100) 


  


 


 


HDL Cholesterol


  


  21 MG/DL


(40-60)  L 


  


 


 


Cholesterol/HDL Ratio  3.3 (3.3-4.4)    


 


Thyroid Stimulating Hormone


(TSH) 


  0.746 uiU/mL


(0.358-3.740) 


  


 








General Appearance:  well appearing, no apparent distress, alert


Head:  normocephalic


EENT:  PERRL/EOMI, normal ENT inspection


Neck:  supple


Respiratory:  normal breath sounds, no respiratory distress


Cardiovascular:  normal rate


Gastrointestinal:  normal inspection, non tender, soft, normal bowel sounds, non

-distended


Rectal:  deferred


Genitourinary:  deferred


Musculoskeletal:  normal inspection, back normal


Neurologic:  alert, oriented x3, responsive, normal inspection


Psychiatric:  normal inspection, judgement/insight normal, memory normal


Skin:  normal inspection, normal color, no rash, warm/dry, palpation normal, 

well hydrated


Lymphatic:  normal inspection, no adenopathy


Current Medications





Current Medications








 Medications


  (Trade)  Dose


 Ordered  Sig/German


 Route


 PRN Reason  Start Time


 Stop Time Status Last Admin


Dose Admin


 


 Albuterol/


 Ipratropium


  (Albuterol/


 Ipratropium)  3 ml  Q6HRT  PRN


 HHN


 Shortness of Breath  1/22/20 01:00


 1/27/20 00:59  1/22/20 09:50


 


 


 Aspirin


  (ASA)  81 mg  DAILY


 ORAL


   1/22/20 09:00


 2/21/20 08:59  1/22/20 08:39


 


 


 Ceftriaxone


 Sodium 1 gm/


 Dextrose  55 ml @ 


 110 mls/hr  Q24H


 IVPB


   1/22/20 16:00


 1/29/20 15:59   


 


 


 Dextrose/Sodium


 Chloride  1,000 ml @ 


 100 mls/hr  Q10H


 IV


   1/22/20 02:15


 2/21/20 02:14  1/22/20 14:22


 


 


 Iohexol


  (Omnipaque)  100 mg  NOW  PRN


 INJ


 Radiology Procedure  1/22/20 14:15


 1/24/20 14:05   


 


 


 Lisinopril


  (ZestriL)  2.5 mg  DAILY


 ORAL


   1/22/20 09:00


 2/21/20 08:59  1/22/20 08:39


 


 


 Methylprednisolone


 Sodium Succinate


  (Solu-MEDROL)  40 mg  EVERY 6  HOURS


 IVP


   1/22/20 15:00


 2/21/20 14:59   


 


 


 Pantoprazole


  (Protonix)  40 mg  DAILY


 ORAL


   1/22/20 09:00


 2/21/20 08:59  1/22/20 08:39


 


 


 Quetiapine


 Fumarate


  (SEROqueL)  100 mg  QHS


 ORAL


   1/22/20 21:00


 2/21/20 20:59   


 


 


 Risperidone


  (RisperDAL)  2 mg  QHS


 ORAL


   1/22/20 21:00


 2/21/20 20:59   


 


 


 Trazodone HCl


  (Desyrel)  100 mg  BEDTIME


 ORAL


   1/22/20 21:00


 2/21/20 20:59   


 











GI: Plan


Problems:  


(1) Cirrhosis


(2) Abnormal LFTs


Plan


Will obtain abdominal ultrasound to evaluate the liver for possible cirrhosis 

and rule out any biliary obstruction 


We will obtain hepatitis panel to rule out any viral hepatitis


Check lipase level


We will consider autoimmune markers and or liver biopsy if needed pending work 

up if unknown origin of cirrhosis


ppi


prn transfusion


repeat liver function tests for tomorrow


will follow on a daily basis with additional recommendations





Discussed with Dr. Reeves.


Thank you for this patient referral, we will follow.





The patient was seen and examined at bedside and all new and available data was 

reviewed in the patients chart. I agree with the above findings, impression 

and plan.  (Patient seen earlier today. Signature stamp does not reflect 

patient encounter time.). - MD Lawanda PetersonDiamond Children's Medical CenterDaysi GANDHI Jan 22, 2020 14:32

## 2020-01-22 NOTE — NUR
NURSE NOTES: Pt saying "I want to go home." Spoke with pt about how he needs to stay in the 
hospital to get better. Pt is having difficulty breathing without oxygen. Pt staying in bed 
for now; will continue to speak with patient.

## 2020-01-23 VITALS — SYSTOLIC BLOOD PRESSURE: 131 MMHG | DIASTOLIC BLOOD PRESSURE: 68 MMHG

## 2020-01-23 VITALS — DIASTOLIC BLOOD PRESSURE: 51 MMHG | SYSTOLIC BLOOD PRESSURE: 103 MMHG

## 2020-01-23 VITALS — SYSTOLIC BLOOD PRESSURE: 104 MMHG | DIASTOLIC BLOOD PRESSURE: 57 MMHG

## 2020-01-23 VITALS — DIASTOLIC BLOOD PRESSURE: 72 MMHG | SYSTOLIC BLOOD PRESSURE: 123 MMHG

## 2020-01-23 VITALS — SYSTOLIC BLOOD PRESSURE: 127 MMHG | DIASTOLIC BLOOD PRESSURE: 71 MMHG

## 2020-01-23 VITALS — SYSTOLIC BLOOD PRESSURE: 105 MMHG | DIASTOLIC BLOOD PRESSURE: 57 MMHG

## 2020-01-23 VITALS — SYSTOLIC BLOOD PRESSURE: 131 MMHG | DIASTOLIC BLOOD PRESSURE: 78 MMHG

## 2020-01-23 VITALS — SYSTOLIC BLOOD PRESSURE: 147 MMHG | DIASTOLIC BLOOD PRESSURE: 83 MMHG

## 2020-01-23 VITALS — DIASTOLIC BLOOD PRESSURE: 65 MMHG | SYSTOLIC BLOOD PRESSURE: 108 MMHG

## 2020-01-23 VITALS — SYSTOLIC BLOOD PRESSURE: 121 MMHG | DIASTOLIC BLOOD PRESSURE: 72 MMHG

## 2020-01-23 VITALS — DIASTOLIC BLOOD PRESSURE: 59 MMHG | SYSTOLIC BLOOD PRESSURE: 99 MMHG

## 2020-01-23 VITALS — SYSTOLIC BLOOD PRESSURE: 113 MMHG | DIASTOLIC BLOOD PRESSURE: 58 MMHG

## 2020-01-23 VITALS — SYSTOLIC BLOOD PRESSURE: 112 MMHG | DIASTOLIC BLOOD PRESSURE: 73 MMHG

## 2020-01-23 VITALS — DIASTOLIC BLOOD PRESSURE: 75 MMHG | SYSTOLIC BLOOD PRESSURE: 127 MMHG

## 2020-01-23 VITALS — DIASTOLIC BLOOD PRESSURE: 85 MMHG | SYSTOLIC BLOOD PRESSURE: 163 MMHG

## 2020-01-23 VITALS — SYSTOLIC BLOOD PRESSURE: 94 MMHG | DIASTOLIC BLOOD PRESSURE: 50 MMHG

## 2020-01-23 VITALS — SYSTOLIC BLOOD PRESSURE: 128 MMHG | DIASTOLIC BLOOD PRESSURE: 65 MMHG

## 2020-01-23 VITALS — SYSTOLIC BLOOD PRESSURE: 88 MMHG | DIASTOLIC BLOOD PRESSURE: 53 MMHG

## 2020-01-23 VITALS — SYSTOLIC BLOOD PRESSURE: 115 MMHG | DIASTOLIC BLOOD PRESSURE: 74 MMHG

## 2020-01-23 VITALS — DIASTOLIC BLOOD PRESSURE: 59 MMHG | SYSTOLIC BLOOD PRESSURE: 109 MMHG

## 2020-01-23 VITALS — SYSTOLIC BLOOD PRESSURE: 143 MMHG | DIASTOLIC BLOOD PRESSURE: 77 MMHG

## 2020-01-23 VITALS — DIASTOLIC BLOOD PRESSURE: 54 MMHG | SYSTOLIC BLOOD PRESSURE: 93 MMHG

## 2020-01-23 VITALS — DIASTOLIC BLOOD PRESSURE: 66 MMHG | SYSTOLIC BLOOD PRESSURE: 119 MMHG

## 2020-01-23 VITALS — SYSTOLIC BLOOD PRESSURE: 118 MMHG | DIASTOLIC BLOOD PRESSURE: 75 MMHG

## 2020-01-23 VITALS — DIASTOLIC BLOOD PRESSURE: 71 MMHG | SYSTOLIC BLOOD PRESSURE: 131 MMHG

## 2020-01-23 VITALS — SYSTOLIC BLOOD PRESSURE: 110 MMHG | DIASTOLIC BLOOD PRESSURE: 57 MMHG

## 2020-01-23 VITALS — DIASTOLIC BLOOD PRESSURE: 72 MMHG | SYSTOLIC BLOOD PRESSURE: 128 MMHG

## 2020-01-23 VITALS — SYSTOLIC BLOOD PRESSURE: 144 MMHG | DIASTOLIC BLOOD PRESSURE: 86 MMHG

## 2020-01-23 VITALS — SYSTOLIC BLOOD PRESSURE: 135 MMHG | DIASTOLIC BLOOD PRESSURE: 89 MMHG

## 2020-01-23 VITALS — DIASTOLIC BLOOD PRESSURE: 70 MMHG | SYSTOLIC BLOOD PRESSURE: 116 MMHG

## 2020-01-23 LAB
ADD MANUAL DIFF: NO
ALBUMIN SERPL-MCNC: 2.7 G/DL (ref 3.4–5)
ALBUMIN/GLOB SERPL: 0.8 {RATIO} (ref 1–2.7)
ALP SERPL-CCNC: 115 U/L (ref 46–116)
ALT SERPL-CCNC: 902 U/L (ref 12–78)
ANION GAP SERPL CALC-SCNC: -3 MMOL/L (ref 5–15)
AST SERPL-CCNC: 154 U/L (ref 15–37)
BILIRUB DIRECT SERPL-MCNC: 0.8 MG/DL (ref 0–0.3)
BILIRUB SERPL-MCNC: 1.3 MG/DL (ref 0.2–1)
BUN SERPL-MCNC: 15 MG/DL (ref 7–18)
CALCIUM SERPL-MCNC: 7.8 MG/DL (ref 8.5–10.1)
CHLORIDE SERPL-SCNC: 94 MMOL/L (ref 98–107)
CO2 SERPL-SCNC: 39 MMOL/L (ref 21–32)
CREAT SERPL-MCNC: 0.6 MG/DL (ref 0.55–1.3)
ERYTHROCYTE [DISTWIDTH] IN BLOOD BY AUTOMATED COUNT: 17.9 % (ref 11.6–14.8)
GLOBULIN SER-MCNC: 3.4 G/DL
HCT VFR BLD CALC: 51.3 % (ref 42–52)
HGB BLD-MCNC: 15.5 G/DL (ref 14.2–18)
MCV RBC AUTO: 87 FL (ref 80–99)
PLATELET # BLD: 119 K/UL (ref 150–450)
POTASSIUM SERPL-SCNC: 6 MMOL/L (ref 3.5–5.1)
RBC # BLD AUTO: 5.88 M/UL (ref 4.7–6.1)
SODIUM SERPL-SCNC: 130 MMOL/L (ref 136–145)
WBC # BLD AUTO: 8.6 K/UL (ref 4.8–10.8)

## 2020-01-23 RX ADMIN — VASOPRESSIN SCH MLS/HR: 20 INJECTION, SOLUTION INTRAMUSCULAR; SUBCUTANEOUS at 22:45

## 2020-01-23 RX ADMIN — IPRATROPIUM BROMIDE AND ALBUTEROL SULFATE PRN ML: .5; 3 SOLUTION RESPIRATORY (INHALATION) at 15:54

## 2020-01-23 RX ADMIN — TRAZODONE HYDROCHLORIDE SCH MG: 100 TABLET ORAL at 22:04

## 2020-01-23 RX ADMIN — PANTOPRAZOLE SODIUM SCH MG: 40 INJECTION, POWDER, FOR SOLUTION INTRAVENOUS at 22:04

## 2020-01-23 RX ADMIN — LORAZEPAM PRN MG: 2 INJECTION, SOLUTION INTRAMUSCULAR; INTRAVENOUS at 17:13

## 2020-01-23 RX ADMIN — METHYLPREDNISOLONE SODIUM SUCCINATE SCH MG: 40 INJECTION, POWDER, LYOPHILIZED, FOR SOLUTION INTRAMUSCULAR; INTRAVENOUS at 12:59

## 2020-01-23 RX ADMIN — METHYLPREDNISOLONE SODIUM SUCCINATE SCH MG: 40 INJECTION, POWDER, LYOPHILIZED, FOR SOLUTION INTRAMUSCULAR; INTRAVENOUS at 18:03

## 2020-01-23 RX ADMIN — LISINOPRIL SCH MG: 2.5 TABLET ORAL at 09:00

## 2020-01-23 RX ADMIN — NITROGLYCERIN SCH PATCH: 0.4 PATCH TRANSDERMAL at 13:00

## 2020-01-23 RX ADMIN — VASOPRESSIN SCH MLS/HR: 20 INJECTION, SOLUTION INTRAMUSCULAR; SUBCUTANEOUS at 19:21

## 2020-01-23 RX ADMIN — METHYLPREDNISOLONE SODIUM SUCCINATE SCH MG: 40 INJECTION, POWDER, LYOPHILIZED, FOR SOLUTION INTRAMUSCULAR; INTRAVENOUS at 05:32

## 2020-01-23 RX ADMIN — ASPIRIN 81 MG SCH MG: 81 TABLET ORAL at 09:00

## 2020-01-23 NOTE — NUR
RESPIRATORY NOTE:

PT REMAINED STABLE ON BIPAP. PT TOLERATING BIPAP WELL. ETCO2 IS BEING CONTINUOUSLY 
MONITORED. NO S/S OF RESPIRATORY DISTRESS NOTED AT THIS TIME.

## 2020-01-23 NOTE — DIAGNOSTIC IMAGING REPORT
Indication: Post orogastric tube placed

 

Technique: Supine view of the abdomen

 

Comparison: none

 

Findings: Orogastric tube tip terminates at the level gastric fundus, proximal

sidehole above the expected level gastric esophageal junction. Considerable gas is

seen in upper limits of normal caliber colon and small bowel

 

Impression: High position of orogastric tube. Advancement recommended.

 

Nonspecific prominent bowel gas pattern, could indicate ileus

 

This agrees with the preliminary interpretation provided overnight by [facility]

teleradiology service.

## 2020-01-23 NOTE — NUR
NURSE NOTES:

Inserted OGT at level 60. Awaiting for KUB and chest xray for tube placement confirmation.

## 2020-01-23 NOTE — NUR
NURSE NOTES:

NGT advanced to 65cm. ET advanced to 25cm. Repeat KUB and Chest xray done.

-------------------------------------------------------------------------------

Addendum: 01/24/20 at 0050 by CINDY HOFFMAN RN

-------------------------------------------------------------------------------

1/24/20 2118H

Incorrect time stamp. Repeat KUB and chest xray done at 2118H

## 2020-01-23 NOTE — HISTORY AND PHYSICAL REPORT
DATE OF ADMISSION:  01/21/2020

HISTORY OF PRESENT ILLNESS:  This is a 49-year-old male who is a very poor

historian.  He came to the emergency room with  known history of

hypoglycemia.  He was given D10 and woke up and subsequently also required

additional D10 infusions.  The patient has been on glyburide at home.  He

is unable to provide a fair history.  He has a history of psych disorder.

Overnight, the patient has been found to have elevated troponin.  He has a

history of schizophrenia and COPD.  He is unable to provide a clear

history.



PAST MEDICAL HISTORY:  Notable for CHF, hypertension, diabetes mellitus,

hyperlipidemia, schizophrenia, chronic lower extremity edema.



HOME MEDICATIONS:  Include aspirin, lisinopril, Protonix, Seroquel,

Risperdal.  He was also given glyburide which I have discontinued.



ALLERGIES:  None reported.



REVIEW OF SYSTEMS:  The patient denies any headaches, hematemesis, melena,

hematochezia, night sweats, or weight loss.



PHYSICAL EXAMINATION:

GENERAL:  Reveals 49-year-old male.

VITAL SIGNS:  Blood pressure 120/60, heart rate 74, respiratory rate _____,

afebrile, O2 saturation 96% on 12 liters of oxygen, 50% FiO2.

HEENT:  Unremarkable.

CHEST:  Clear breath sounds bilaterally.

ABDOMEN:  Soft.

EXTREMITIES:  There is no edema.



LABORATORY AND DIAGNOSTIC DATA:  Laboratory testing shows normal CBC with

normal platelet count. ______ count is high.  Chemistries notable for

glucose of 36 and 23.  On repeated measurements AST and ALT are both

elevated, alkaline phosphatase 120. Troponin 1.0 and then and 0.9.  His

creatinine is 0.8, total bilirubin is 1.5.  Albumin is 2.7.  Toxicology

positive for marijuana.  Urinalysis is negative.



IMAGING STUDIES:  Venous duplex was obtained, which is normal.  There is no

DVT.



IMPRESSION:

1. Hypoxemia.

2. Troponin leak.

3. History of congestive heart failure.

4. Hypoglycemia.

5. Schizophrenia.

6. Malnutrition.

7. Marijuana use.



DISCUSSION:  It is unclear as to what is causing his hypoxemia, it could be

exacerbation of COPD.  I will start him on medications for the same along

with oxygen.  Cardiology following him for his MI.  I will consult GI for

his transaminitis and hyperbilirubinemia.  Start dextrose drip.  Serial

troponins.  We will follow carefully.  Empiric antibiotics.









  ______________________________________________

  True Duffy M.D.





DR:  Amanuel

D:  01/22/2020 14:10

T:  01/22/2020 19:43

JOB#:  [id number]

CC:

## 2020-01-23 NOTE — NUR
NURSE NOTES:

Co2 appears to be trending down now 23- pt. does not appear to be restless and appears to be 
resting comfortably- with current BIPAP settings 20/5 FIo2 50%- pulse ox 92%- will continue 
to monitor patient and with plan of care.

## 2020-01-23 NOTE — GI PROGRESS NOTE
Assessment/Plan


Problems:  


(1) Abnormal LFTs


ICD Codes:  R94.5 - Abnormal results of liver function studies


SNOMED:  429024478


(2) COPD exacerbation


ICD Codes:  J44.1 - Chronic obstructive pulmonary disease with (acute) 

exacerbation


SNOMED:  022253280


(3) Elevated troponin


ICD Codes:  R79.89 - Other specified abnormal findings of blood chemistry


SNOMED:  004181238, 342853013, 175457460


(4) Diabetes mellitus


ICD Codes:  E11.9 - Type 2 diabetes mellitus without complications


SNOMED:  12843128


Status:  unchanged


Status Narrative


Discussed with Dr. Reeves.


Assessment/Plan


Abdominal US reviewed >> Cholelithiasis. Hepatosplenomegaly.  Some degree of 

portal hypertension not excluded. Trace ascites


We will obtain hepatitis panel to rule out any viral hepatitis >> pending





hold statins


on bipap


ppi


prn transfusion


repeat liver function tests for tomorrow


will follow on a daily basis with additional recommendations


recommend outpatient fibroid scan to grade cirrhosis





The patient was seen and examined at bedside and all new and available data was 

reviewed in the patients chart. I agree with the above findings, impression 

and plan.  (Patient seen earlier today. Signature stamp does not reflect 

patient encounter time.). - Camron Reeves MD





Subjective


Gastrointestinal/Abdominal:  Reports: no symptoms


Subjective


SOB





Objective





Last 24 Hour Vital Signs








  Date Time  Temp Pulse Resp B/P (MAP) Pulse Ox O2 Delivery O2 Flow Rate FiO2


 


1/23/20 10:58  73 21  95 Facial  50


 


1/23/20 09:18  88 25  96 Facial  50


 


1/23/20 09:00    127/75    


 


1/23/20 08:03  93      


 


1/23/20 08:00 97.0 61 18 127/75 (92) 92   


 


1/23/20 08:00      Bi-pap  


 


1/23/20 08:00        50


 


1/23/20 06:34     95 Bi-Pap  50


 


1/23/20 06:34  84 23  95 Facial  50


 


1/23/20 05:10  77 18  93 Facial  50


 


1/23/20 04:00      Venturi Mask 12.0 


 


1/23/20 04:00        50


 


1/23/20 04:00 98.6 78 20 131/68 (89) 94   


 


1/23/20 03:29  86      


 


1/23/20 02:30  79 22  94 Facial  50


 


1/23/20 01:10        50


 


1/23/20 01:00  86 21  95 Facial  50


 


1/23/20 00:00 97.7 98 22 135/89 (104) 93   


 


1/23/20 00:00       12.0 50


 


1/23/20 00:00      Venturi Mask 12.0 


 


1/22/20 23:30  89      


 


1/22/20 22:59     92 Venturi Mask 12.0 50


 


1/22/20 21:50       8.0 40


 


1/22/20 21:42     92 Venturi Mask 8.0 40


 


1/22/20 20:00       12.0 50


 


1/22/20 20:00 97.5 88 20 130/80 (97) 96   


 


1/22/20 20:00      Venturi Mask 12.0 


 


1/22/20 19:04  93      


 


1/22/20 16:00 97.2 81 20 128/75 (92) 81   


 


1/22/20 16:00       12.0 50


 


1/22/20 16:00      Venturi Mask 12.0 


 


1/22/20 16:00  81      


 


1/22/20 12:00  75      


 


1/22/20 12:00 96.8 74 20 126/85 (99) 96   


 


1/22/20 12:00      Venturi Mask 12.0 


 


1/22/20 12:00       12.0 50

















Intake and Output  


 


 1/22/20 1/23/20





 19:00 07:00


 


Intake Total 100 ml 1166 ml


 


Output Total  350 ml


 


Balance 100 ml 816 ml


 


  


 


Intake IV Total 100 ml 1166 ml


 


Output Urine Total  350 ml


 


# Voids 4 2











Laboratory Tests








Test


  1/22/20


11:45 1/22/20


20:00 1/23/20


00:41 1/23/20


02:31


 


Troponin I


  0.869 ng/mL


(0.000-0.056) 0.731 ng/mL


(0.000-0.056) 


  


 


 


Arterial Blood pH


  


  


  7.133


(7.350-7.450) 7.211


(7.350-7.450)


 


Arterial Blood Partial


Pressure CO2 


  


  135.1 mmHg


(35.0-45.0)  *H 100.3 mmHg


(35.0-45.0)  *H


 


Arterial Blood Partial


Pressure O2 


  


  82.8 mmHg


(75.0-100.0) 77.5 mmHg


(75.0-100.0)


 


Arterial Blood HCO3


  


  


  44.2 mmol/L


(22.0-26.0)  *H 39.3 mmol/L


(22.0-26.0)  H


 


Arterial Blood Oxygen


Saturation 


  


  93.2 %


()  L 94.2 %


()  L


 


Arterial Blood Base Excess   8.7 (-2-2)  H 6.9 (-2-2)  H


 


Frandy Test   Positive   Positive  


 


Test


  1/23/20


03:30 1/23/20


05:55 1/23/20


06:34 1/23/20


11:00


 


White Blood Count


  8.6 K/UL


(4.8-10.8) 


  


  


 


 


Red Blood Count


  5.88 M/UL


(4.70-6.10) 


  


  


 


 


Hemoglobin


  15.5 G/DL


(14.2-18.0) 


  


  


 


 


Hematocrit


  51.3 %


(42.0-52.0) 


  


  


 


 


Mean Corpuscular Volume 87 FL (80-99)     


 


Mean Corpuscular Hemoglobin


  26.5 PG


(27.0-31.0)  L 


  


  


 


 


Mean Corpuscular Hemoglobin


Concent 30.3 G/DL


(32.0-36.0)  L 


  


  


 


 


Red Cell Distribution Width


  17.9 %


(11.6-14.8)  H 


  


  


 


 


Platelet Count


  119 K/UL


(150-450)  L 


  


  


 


 


Mean Platelet Volume


  7.9 FL


(6.5-10.1) 


  


  


 


 


Neutrophils (%) (Auto)


  % (45.0-75.0)


  


  


  


 


 


Lymphocytes (%) (Auto)


  % (20.0-45.0)


  


  


  


 


 


Monocytes (%) (Auto)  % (1.0-10.0)     


 


Eosinophils (%) (Auto)  % (0.0-3.0)     


 


Basophils (%) (Auto)  % (0.0-2.0)     


 


Sodium Level


  130 MMOL/L


(136-145)  L 


  


  


 


 


Potassium Level


  6.0 MMOL/L


(3.5-5.1)  *H 6.3 MMOL/L


(3.5-5.1)  *H 


  


 


 


Chloride Level


  94 MMOL/L


()  L 


  


  


 


 


Carbon Dioxide Level


  39 MMOL/L


(21-32)  H 


  


  


 


 


Anion Gap


  -3 mmol/L


(5-15)  L 


  


  


 


 


Blood Urea Nitrogen


  15 mg/dL


(7-18) 


  


  


 


 


Creatinine


  0.6 MG/DL


(0.55-1.30) 


  


  


 


 


Estimat Glomerular Filtration


Rate > 60 mL/min


(>60) 


  


  


 


 


Glucose Level


  336 MG/DL


()  #H 


  


  


 


 


Calcium Level


  7.8 MG/DL


(8.5-10.1)  L 


  


  


 


 


Total Bilirubin


  1.3 MG/DL


(0.2-1.0)  H 


  


  


 


 


Direct Bilirubin


  0.8 MG/DL


(0.0-0.3)  H 


  


  


 


 


Aspartate Amino Transf


(AST/SGOT) 154 U/L


(15-37)  H 


  


  


 


 


Alanine Aminotransferase


(ALT/SGPT) 902 U/L


(12-78)  H 


  


  


 


 


Alkaline Phosphatase


  115 U/L


() 


  


  


 


 


Total Protein


  6.1 G/DL


(6.4-8.2)  L 


  


  


 


 


Albumin


  2.7 G/DL


(3.4-5.0)  L 


  


  


 


 


Globulin 3.4 g/dL     


 


Albumin/Globulin Ratio


  0.8 (1.0-2.7)


L 


  


  


 


 


Lipase


  42 U/L


()  L 


  


  


 


 


Hepatitis A IgM Antibody Pending     


 


Hepatitis B Surface Antigen Pending     


 


Hepatitis B Core IgM Antibody Pending     


 


Hepatitis C Antibody Pending     


 


Arterial Blood pH


  


  


  7.218


(7.350-7.450) 7.201


(7.350-7.450)


 


Arterial Blood Partial


Pressure CO2 


  


  83.8 mmHg


(35.0-45.0)  *H 123.5 mmHg


(35.0-45.0)  *H


 


Arterial Blood Partial


Pressure O2 


  


  95.9 mmHg


(75.0-100.0) 96.8 mmHg


(75.0-100.0)


 


Arterial Blood HCO3


  


  


  33.4 mmol/L


(22.0-26.0)  H 47.3 mmol/L


(22.0-26.0)  *H


 


Arterial Blood Oxygen


Saturation 


  


  96.3 %


() 96.1 %


()


 


Arterial Blood Base Excess   2.5 (-2-2)  H 13.1 (-2-2)  *H


 


Frandy Test   Positive   Positive  








Height (Feet):  6


Height (Inches):  3.00


Weight (Pounds):  246


General Appearance:  WD/WN, no apparent distress, alert


Cardiovascular:  normal rate


Respiratory/Chest:  normal breath sounds, no respiratory distress


Abdominal Exam:  normal bowel sounds, non tender, soft


Extremities:  normal range of motion, non-tender











Rashid Webber NP Jan 23, 2020 11:40

## 2020-01-23 NOTE — NUR
NURSE NOTES:

Spoke with Noy Lee; once dose Haldol 5mg IM was given at 1258; Haldol 5mg IM Q4H PRN 
for agitation time wasn't reached; advised by Noy Lee to waste dose that was drawn. 
YAZAN Wagoner made aware, still waiting on MD Tati callback.

## 2020-01-23 NOTE — NUR
NURSE NOTES:



Informed Dr. Duffy regarding latest ABG result. And MD said not to give ativan order by 
Dr. Vergara. And OK to give haldol order by Dr. Harris. Dr. Duffy ordered to transfer 
patient to ICU and to intubate. MD will call ED Doctor. Charge nurse made aware.

## 2020-01-23 NOTE — NUR
NURSE NOTES:

GURJIT Tadeo DO made aware of ABG results, DO ordered additional ABG to be done, and to keep 
PT on bipap until ABG results. Will continue to monitor PT.

## 2020-01-23 NOTE — NUR
Pt was intubated due to severe hypercapnia. Intubated by MD Sandoval with an 8.0 24@lip. 
Bilateral breath sounds noted. Condensation noted. Tube is secure and in place. Alarms are 
on and audible. Will continue to monitor. 

-------------------------------------------------------------------------------

Addendum: 01/23/20 at 1523 by SADA EDWARDS RT

-------------------------------------------------------------------------------

Amended: Links added.

## 2020-01-23 NOTE — GENERAL PROGRESS NOTE
Assessment/Plan


Problem List:  


(1) Diabetes mellitus


ICD Codes:  E11.9 - Type 2 diabetes mellitus without complications


SNOMED:  30759182


(2) Hypoglycemia


ICD Codes:  E16.2 - Hypoglycemia, unspecified


SNOMED:  642758917


(3) Schizophrenia


ICD Codes:  F20.9 - Schizophrenia, unspecified


SNOMED:  81140437


Qualifiers:  


   Qualified Codes:  F20.9 - Schizophrenia, unspecified


(4) Elevated troponin


ICD Codes:  R79.89 - Other specified abnormal findings of blood chemistry


SNOMED:  550838423, 557715421, 226849865


(5) COPD exacerbation


ICD Codes:  J44.1 - Chronic obstructive pulmonary disease with (acute) 

exacerbation


SNOMED:  151081679


(6) Abnormal LFTs


ICD Codes:  R94.5 - Abnormal results of liver function studies


SNOMED:  939390319


Assessment/Plan:


continue glucose monitoring  


DC dextrose drip 


hypoglycemia protocol in order





Subjective


Allergies:  


Coded Allergies:  


     No Known Allergies (Unverified , 1/16/18)


All Systems:  reviewed and negative except above


Subjective


events noted 


glucose > 300 this morning 











Item Value  Date Time


 


Bedside Blood Glucose 339 mg/dl H 1/23/20 0548


 


Bedside Blood Glucose 275 mg/dl H 1/22/20 2135


 


Bedside Blood Glucose 150 mg/dl H 1/22/20 1630


 


Bedside Blood Glucose 89 mg/dl 1/22/20 1130


 


Bedside Blood Glucose 136 mg/dl H 1/22/20 0552











Objective





Last 24 Hour Vital Signs








  Date Time  Temp Pulse Resp B/P (MAP) Pulse Ox O2 Delivery O2 Flow Rate FiO2


 


1/23/20 06:34  84 23  95 Facial  50


 


1/23/20 05:10  77 18  93 Facial  50


 


1/23/20 04:00      Venturi Mask 12.0 


 


1/23/20 04:00        50


 


1/23/20 04:00 98.6 78 20 131/68 (89) 94   


 


1/23/20 03:29  86      


 


1/23/20 02:30  79 22  94 Facial  50


 


1/23/20 01:10        50


 


1/23/20 01:00  86 21  95 Facial  50


 


1/23/20 00:00 97.7 98 22 135/89 (104) 93   


 


1/23/20 00:00       12.0 50


 


1/23/20 00:00      Venturi Mask 12.0 


 


1/22/20 23:30  89      


 


1/22/20 22:59     92 Venturi Mask 12.0 50


 


1/22/20 21:50       8.0 40


 


1/22/20 21:42     92 Venturi Mask 8.0 40


 


1/22/20 20:00       12.0 50


 


1/22/20 20:00 97.5 88 20 130/80 (97) 96   


 


1/22/20 20:00      Venturi Mask 12.0 


 


1/22/20 19:04  93      


 


1/22/20 16:00 97.2 81 20 128/75 (92) 81   


 


1/22/20 16:00       12.0 50


 


1/22/20 16:00      Venturi Mask 12.0 


 


1/22/20 16:00  81      


 


1/22/20 12:00  75      


 


1/22/20 12:00 96.8 74 20 126/85 (99) 96   


 


1/22/20 12:00      Venturi Mask 12.0 


 


1/22/20 12:00       12.0 50


 


1/22/20 09:51  77 20  96 Venturi Mask 10.0 45


 


1/22/20 09:51  77 18  98 Venturi Mask 10.0 45





  77 20  96   


 


1/22/20 08:39    120/71    


 


1/22/20 08:00 96.5 78 20 120/71 (87) 93   


 


1/22/20 08:00  78      


 


1/22/20 08:00      Venturi Mask 12.0 


 


1/22/20 08:00       14.0 55


 


1/22/20 08:00     94 Nasal Cannula 4.0 36

















Intake and Output  


 


 1/22/20 1/23/20





 19:00 07:00


 


Intake Total 100 ml 1166 ml


 


Output Total  350 ml


 


Balance 100 ml 816 ml


 


  


 


Intake IV Total 100 ml 1166 ml


 


Output Urine Total  350 ml


 


# Voids 4 2








Laboratory Tests


1/22/20 11:45: Troponin I 0.869H


1/22/20 20:00: Troponin I 0.731H


1/23/20 00:41: 


Arterial Blood pH 7.133*L, Arterial Blood Partial Pressure CO2 135.1*H, 

Arterial Blood Partial Pressure O2 82.8, Arterial Blood HCO3 44.2*H, Arterial 

Blood Oxygen Saturation 93.2L, Arterial Blood Base Excess 8.7H, Frandy Test 

Positive


1/23/20 02:31: 


Arterial Blood pH 7.211*L, Arterial Blood Partial Pressure CO2 100.3*H, 

Arterial Blood Partial Pressure O2 77.5, Arterial Blood HCO3 39.3H, Arterial 

Blood Oxygen Saturation 94.2L, Arterial Blood Base Excess 6.9H, Frandy Test 

Positive


1/23/20 03:30: 


White Blood Count 8.6, Red Blood Count 5.88, Hemoglobin 15.5, Hematocrit 51.3, 

Mean Corpuscular Volume 87, Mean Corpuscular Hemoglobin 26.5L, Mean Corpuscular 

Hemoglobin Concent 30.3L, Red Cell Distribution Width 17.9H, Platelet Count 119L

, Mean Platelet Volume 7.9, Neutrophils (%) (Auto) , Lymphocytes (%) (Auto) , 

Monocytes (%) (Auto) , Eosinophils (%) (Auto) , Basophils (%) (Auto) , Sodium 

Level 130L, Potassium Level 6.0*H, Chloride Level 94L, Carbon Dioxide Level 39H

, Anion Gap -3L, Blood Urea Nitrogen 15, Creatinine 0.6, Estimat Glomerular 

Filtration Rate > 60, Glucose Level 336#H, Calcium Level 7.8L, Total Bilirubin 

1.3H, Direct Bilirubin 0.8H, Aspartate Amino Transf (AST/SGOT) 154H, Alanine 

Aminotransferase (ALT/SGPT) 902H, Alkaline Phosphatase 115, Total Protein 6.1L, 

Albumin 2.7L, Globulin 3.4, Albumin/Globulin Ratio 0.8L, Lipase 42L, Hepatitis 

A IgM Antibody [Pending], Hepatitis B Surface Antigen [Pending], Hepatitis B 

Core IgM Antibody [Pending], Hepatitis C Antibody [Pending]


1/23/20 05:55: Potassium Level 6.3*H


1/23/20 06:34: 


Arterial Blood pH 7.218*L, Arterial Blood Partial Pressure CO2 83.8*H, Arterial 

Blood Partial Pressure O2 95.9, Arterial Blood HCO3 33.4H, Arterial Blood 

Oxygen Saturation 96.3, Arterial Blood Base Excess 2.5H, Frandy Test Positive


Height (Feet):  6


Height (Inches):  3.00


Weight (Pounds):  246


General Appearance:  no apparent distress


Neck:  normal alignment


Cardiovascular:  normal rate


Respiratory/Chest:  lungs clear


Abdomen:  normal bowel sounds


Objective





Current Medications








 Medications


  (Trade)  Dose


 Ordered  Sig/German


 Route


 PRN Reason  Start Time


 Stop Time Status Last Admin


Dose Admin


 


 Acetaminophen


  (Tylenol)  650 mg  Q6H  PRN


 ORAL


 Mild Pain/Temp > 100.5  1/22/20 18:00


 2/21/20 17:59   


 


 


 Albuterol/


 Ipratropium


  (Albuterol/


 Ipratropium)  3 ml  Q6HRT  PRN


 HHN


 Shortness of Breath  1/22/20 01:00


 1/27/20 00:59  1/22/20 09:50


 


 


 Aspirin


  (ASA)  81 mg  DAILY


 ORAL


   1/22/20 09:00


 2/21/20 08:59  1/22/20 08:39


 


 


 Ceftriaxone


 Sodium 1 gm/


 Dextrose  55 ml @ 


 110 mls/hr  Q24H


 IVPB


   1/22/20 16:00


 1/29/20 15:59  1/22/20 15:59


 


 


 Dextrose/Sodium


 Chloride  1,000 ml @ 


 100 mls/hr  Q10H


 IV


   1/22/20 02:15


 2/21/20 02:14  1/22/20 14:22


 


 


 Guaifenesin/


 Dextromethorphan


  (Robitussin DM


 Syrup)  5 ml  Q6H  PRN


 ORAL


 For Cough  1/22/20 18:00


 2/21/20 17:59   


 


 


 Iohexol


  (Omnipaque)  100 mg  NOW  PRN


 INJ


 Radiology Procedure  1/22/20 14:15


 1/24/20 14:05   


 


 


 Lisinopril


  (ZestriL)  2.5 mg  DAILY


 ORAL


   1/22/20 09:00


 2/21/20 08:59  1/22/20 08:39


 


 


 Methylprednisolone


 Sodium Succinate


  (Solu-MEDROL)  40 mg  EVERY 6  HOURS


 IVP


   1/22/20 15:00


 2/21/20 14:59  1/22/20 14:26


 


 


 Pantoprazole


  (Protonix)  40 mg  DAILY


 ORAL


   1/22/20 09:00


 2/21/20 08:59  1/22/20 08:39


 


 


 Quetiapine


 Fumarate


  (SEROqueL)  100 mg  QHS


 ORAL


   1/22/20 21:00


 2/21/20 20:59   


 


 


 Risperidone


  (RisperDAL)  2 mg  QHS


 ORAL


   1/22/20 21:00


 2/21/20 20:59   


 


 


 Trazodone HCl


  (Desyrel)  100 mg  BEDTIME


 ORAL


   1/22/20 21:00


 2/21/20 20:59   


 

















Nathen Joiner MD Jan 23, 2020 07:33

## 2020-01-23 NOTE — DIAGNOSTIC IMAGING REPORT
Indication: Post intubation

 

Technique: One view of the chest

 

Comparison: 4 hours earlier

 

Findings: Interim retraction of endotracheal tube, tip now projecting at the level of

the thoracic inlet. Interim placement of a gastric tube, tip of which projects beyond

the edge of the image, tip position therefore indeterminate. Lungs and pleural spaces

are clear.

 

Impression: High position of endotracheal tube

 

Interim nasogastric intubation, tip beyond the image volume, subsequently visualized

on abdomen radiograph

 

This agrees with the preliminary interpretation provided overnight by [facility]

teleradiology service.

## 2020-01-23 NOTE — NUR
NURSE NOTES:

No shortness of breath. 100% saturation on the monitor.Secretions suctioned. Noted large 
amount of thick white secretion from the mouth. With small amount of blood coming out from 
the nose.

-------------------------------------------------------------------------------

Addendum: 01/24/20 at 0357 by CINDY HOFFMAN RN

-------------------------------------------------------------------------------

NURSE NOTES:

Wrong date stamp

## 2020-01-23 NOTE — NUR
NURSE NOTES:

pt. noted to appear restless and SOB - pulse ox fluctuating between 83%- 88%- RT called to 
bed side- STAT ABG ordered- awaiting for results- to notify DR. Duffy.  Will continue to 
monitor pt. and with plan of care.

## 2020-01-23 NOTE — NUR
NURSE NOTES:

Late entry: PT and report received from MILADIS Charles; PT is A/O x 2, verbal, not orientated, 
confused, combative with staff. PT received with BIPAP 20/5 @ 50%; saturating at 97%; no S/S 
of respiratory distress noted;  NPO status; PT received with L-hand 20g and R-AC 20g intact 
patent flushes well. PT received with bilateral soft wrist restraints on for safety to self 
and staff. Will continue to monitor PT.

## 2020-01-23 NOTE — DIAGNOSTIC IMAGING REPORT
Indication: Post ET tube placement

 

Technique: One view of the chest

 

Comparison: 1/21/2020

 

Findings: Interim placement of an endotracheal tube, endotracheal tube tip projected

approximate 6 cm above the paige, in good position. Bilateral interstitial disease

appears similar to the previous exam. The heart is enlarged. No focal airspace

consolidation. No effusions

 

Impression: Satisfactory endotracheal intubation

 

Stable cardiomegaly.

 

Interstitial prominence, unchanged, may reflect mild interstitial congestion

 

Findings discussed by phone with patient's nurse at the time of interpretation

## 2020-01-23 NOTE — EMERGENCY ROOM REPORT
History of Present Illness


General


Chief Complaint:  Abnormal Labs


Source:  Patient, Medical Record





Present Illness


Allergies:  


Coded Allergies:  


     No Known Allergies (Unverified , 1/16/18)





Nursing Documentation-[facility]


Past Medical History:  No History, Except For


Hx Cardiac Problems:  Yes


Hx Hypertension:  Yes


Hx Asthma:  Yes


Hx Diabetes:  Yes


Hx Cancer:  No


Hx Gastrointestinal Problems:  Yes


Hx Neurological Problems:  No





Physical Exam





Vital Signs








  Date Time  Temp Pulse Resp B/P (MAP) Pulse Ox O2 Delivery O2 Flow Rate FiO2


 


1/21/20 18:36 98.1 67      


 


1/21/20 18:36   18 130/68 (88) 99 Room Air  


 


1/21/20 20:00       2.0 


 


1/22/20 08:00        36











Procedures


Intubation


Intubation :  


   Time of Intubation:  17:09


   Intubation Method:  orotracheal


   Tube Size (cm):  8.0


   Medications:  Etomidate, Rocuronium


   Breath Sounds after Intubation:  equal


   Intubation Complications:  no complications


   Attempts:  One


   Patient Tolerated:  Well


   Complications:  None





Medical Decision Making


Diagnostic Impression:  


 Primary Impression:  


 Hypoglycemia


 Additional Impressions:  


 Schizophrenia


 Qualified Codes:  F20.9 - Schizophrenia, unspecified


 Elevated troponin


 COPD exacerbation


 Hypoxia





Last Vital Signs








  Date Time  Temp Pulse Resp B/P (MAP) Pulse Ox O2 Delivery O2 Flow Rate FiO2


 


1/23/20 15:54  79 21  97 Mechanical Ventilator  45


 


1/23/20 14:00    112/73 (86)    


 


1/23/20 12:42 98.6       


 


1/23/20 08:00        








Disposition:  ADMITTED AS INPATIENT


Condition:  Serious


Referrals:  


NOT CHOSEN IPA/MD,REFERRING (PCP)











Kishor Sandoval MD Jan 23, 2020 17:18

## 2020-01-23 NOTE — NUR
NURSE NOTES:



Received patient in bed. Anxious, removing his bipap and trying to get out of bed. 
Uncooperative to staff, and patient is tying to hit staff members.

## 2020-01-23 NOTE — NUR
RESPIRATORY NOTE:

RT WAS CALLED BECAUSE PT WAS SLEEPY AND HARD TO AROUSE. ABG WAS DRAWN AND PT WAS PLACED ON 
BIPAP POST ABG RESULTS.  CURRENT BIPAP SETTINGS ARE: 20/5, RATE 18, 50% FIO2. ALARMS ARE ON 
AND AUDIBLE. PT TOLERATING BIPAP WELL. BIPAP WAS CONNECTED TO A RED OUTLET. ETCO2 MONITOR 
CURRENTLY IN PLACE. ABG TO BE DRAWN IN ONE HOUR. WILL CONTINUE TO CLOSELY MONITOR PT.

## 2020-01-23 NOTE — NUR
NURSE NOTES:

noted no urine output from the condom cath. Bladder scan shows 383 ml urine in the bladder. 
Dr. Murphy at in the unit, seen and examined patient. With new order to insert dee 
catheter.

## 2020-01-23 NOTE — NUR
HAND-OFF: 

Report given to MILADIS Charles- pt. remains stable and no signs of distress noted- Aware to f/u 
on abnormal abgs results- abnormal potassium  adn pt. removing BIPAP- non-compliant- aware 
to f/u with doctor. Also to f/u regarding diet as pt. is on Bipap now.

## 2020-01-23 NOTE — NUR
NURSE NOTES:



While repositioning pt, with primary RN Rober, PT managed to self-extubate; despite 
bilateral soft wrist restraints on. RT called immediately. Fauzia REDDING notified.

## 2020-01-23 NOTE — NUR
NURSE NOTES:

patients ABG results abnormal- notified doctor of ABG results- PH 7.1, pCO2 135.1, Po2 82.8, 
HCo3 44.2- per DR. Tati smith to order BIPAP 20/5 Fio2 at 50% and ABGS in am- will carry 
out orders.

-------------------------------------------------------------------------------

Addendum: 01/23/20 at 0113 by MARIELLA JACKMAN RN RN

-------------------------------------------------------------------------------

notified doctor that pt. was not breathing well- appeared SOB- STAT ABG was ordered.

## 2020-01-23 NOTE — NUR
NURSE NOTES:

notified Iesha at RT- that previous RT did not enter ABG results for 0051- aware to enter 
results.

## 2020-01-23 NOTE — PROGRESS NOTE
DATE:  01/22/2020

CARDIOLOGY PROGRESS NOTE



SUBJECTIVE:  The patient has some shortness of breath, no pain, and

continues to be hypoglycemic.  Liver enzymes are elevated.  Troponin

levels have decreased from the range of 1 to 0.8.  Echocardiogram

completed reveals right-sided enlargement, abnormal septal motion,

pulmonary hypertension of moderate to severe severity with PA pressure of

50 mmHg estimated.



PHYSICAL EXAMINATION:

VITAL SIGNS:  Blood pressure 128/75, pulse 81, and respirations 20.

Afebrile.

LUNGS:  Diminished breath sounds.  Few rhonchi.

CARDIAC:  Regular rhythm and rate.  Normal S1, S2.  A 1/6 systolic murmur

at left sternal border.

ABDOMEN:  Soft, ascitic, nontender.

EXTREMITIES:  1+ dependent edema.



DIAGNOSTIC DATA:  Venous duplex negative for DVT.  CT angiogram of the

chest ordered by me for pulmonary embolism evaluation was negative.



IMPRESSION:  Unclear etiology for right heart enlargement.  Does not appear

to be acute based on size of the right ventricle and there appears to be

pulmonary hypertension.  There is evidence of cirrhosis and continued

transaminitis.  Hypoglycemia is ongoing.



PLAN:

1. Followup abdominal ultrasound results.

2. Dextrose drip.

3. No role for diuresis at present.

4. Respiratory hygiene.

5. Continue anti-platelet therapy.

6. Hold all anti-lipid drugs in view of transaminitis, namely

gemfibrozil and atorvastatin.









  ______________________________________________

  Franklin Murphy M.D.





DR:  JULIET

D:  01/22/2020 23:25

T:  01/23/2020 00:24

JOB#:  [id number]

CC:

## 2020-01-23 NOTE — NUR
RESPIRATORY NOTE:pt recieved on new vent order of AC 28 600 100% +5, et tube @ 25 LL, dr 
request ET tube be advanced per xray 4 cm, 29@ LL, sat 100% will continue to monitor the pt

## 2020-01-23 NOTE — PULMONOLOGY PROGRESS NOTE
Assessment/Plan


Assessment/Plan


IMPRESSION:


1. Hypoglycemia. Seen by endocrine


2. Metabolic encephalopathy.


3. Chronic obstructive pulmonary disease with exacerbation. Now on BiPAP


4. Acute myocardial infarction.


5. Hyponatremia.


6. Hypochloremia.


7. Hypomagnesemia.


8. Transaminitis.


9. Acute on chronic congestive heart failure, possibly systolic and


diastolic.





PLAN:


1. Dextrose drip 


2. Serial troponin.


3. Anti-platelet therapy.


4. On BiPAP


5. Hold diuresis.


6. DVT prophylaxis.


7. Empiric antimicrobials.


8. Echocardiogram.


9. Metabolic profile.


10. Abdominal ultrasound.





GI, endocrine and cardiology consults appreciated














  ______________________________________________


  Franklin Murphy M.D.





Subjective


Interval Events:  On BiPAP; has hypercapnia


Constitutional:  Reports: no symptoms


HEENT:  Repors: no symptoms


Respiratory:  Reports: shortness of breath


Cardiovascular:  Reports: no symptoms


Gastrointestinal/Abdominal:  Reports: no symptoms


Genitourinary:  Reports: no symptoms


Allergies:  


Coded Allergies:  


     No Known Allergies (Unverified , 1/16/18)





Objective





Last 24 Hour Vital Signs








  Date Time  Temp Pulse Resp B/P (MAP) Pulse Ox O2 Delivery O2 Flow Rate FiO2


 


1/23/20 06:34  84 23  95 Facial  50


 


1/23/20 05:10  77 18  93 Facial  50


 


1/23/20 04:00      Venturi Mask 12.0 


 


1/23/20 04:00        50


 


1/23/20 04:00 98.6 78 20 131/68 (89) 94   


 


1/23/20 03:29  86      


 


1/23/20 02:30  79 22  94 Facial  50


 


1/23/20 01:10        50


 


1/23/20 01:00  86 21  95 Facial  50


 


1/23/20 00:00 97.7 98 22 135/89 (104) 93   


 


1/23/20 00:00       12.0 50


 


1/23/20 00:00      Venturi Mask 12.0 


 


1/22/20 23:30  89      


 


1/22/20 22:59     92 Venturi Mask 12.0 50


 


1/22/20 21:50       8.0 40


 


1/22/20 21:42     92 Venturi Mask 8.0 40


 


1/22/20 20:00       12.0 50


 


1/22/20 20:00 97.5 88 20 130/80 (97) 96   


 


1/22/20 20:00      Venturi Mask 12.0 


 


1/22/20 19:04  93      


 


1/22/20 16:00 97.2 81 20 128/75 (92) 81   


 


1/22/20 16:00       12.0 50


 


1/22/20 16:00      Venturi Mask 12.0 


 


1/22/20 16:00  81      


 


1/22/20 12:00  75      


 


1/22/20 12:00 96.8 74 20 126/85 (99) 96   


 


1/22/20 12:00      Venturi Mask 12.0 


 


1/22/20 12:00       12.0 50


 


1/22/20 09:51  77 20  96 Venturi Mask 10.0 45


 


1/22/20 09:51  77 18  98 Venturi Mask 10.0 45





  77 20  96   


 


1/22/20 08:39    120/71    


 


1/22/20 08:00 96.5 78 20 120/71 (87) 93   


 


1/22/20 08:00  78      


 


1/22/20 08:00      Venturi Mask 12.0 


 


1/22/20 08:00       14.0 55


 


1/22/20 08:00     94 Nasal Cannula 4.0 36

















Intake and Output  


 


 1/22/20 1/23/20





 19:00 07:00


 


Intake Total 100 ml 1066 ml


 


Output Total  350 ml


 


Balance 100 ml 716 ml


 


  


 


Intake IV Total 100 ml 1066 ml


 


Output Urine Total  350 ml


 


# Voids 4 2








General Appearance:  no acute distress


HEENT:  normocephalic


Respiratory/Chest:  chest wall non-tender, decreased breath sounds


Cardiovascular:  normal peripheral pulses


Abdomen:  normal bowel sounds


Laboratory Tests


1/22/20 11:45: Troponin I 0.869H


1/22/20 20:00: Troponin I 0.731H


1/23/20 00:41: 


Arterial Blood pH 7.133*L, Arterial Blood Partial Pressure CO2 135.1*H, 

Arterial Blood Partial Pressure O2 82.8, Arterial Blood HCO3 44.2*H, Arterial 

Blood Oxygen Saturation 93.2L, Arterial Blood Base Excess 8.7H, Frandy Test 

Positive


1/23/20 02:31: 


Arterial Blood pH 7.211*L, Arterial Blood Partial Pressure CO2 100.3*H, 

Arterial Blood Partial Pressure O2 77.5, Arterial Blood HCO3 39.3H, Arterial 

Blood Oxygen Saturation 94.2L, Arterial Blood Base Excess 6.9H, Frandy Test 

Positive


1/23/20 03:30: 


White Blood Count 8.6, Red Blood Count 5.88, Hemoglobin 15.5, Hematocrit 51.3, 

Mean Corpuscular Volume 87, Mean Corpuscular Hemoglobin 26.5L, Mean Corpuscular 

Hemoglobin Concent 30.3L, Red Cell Distribution Width 17.9H, Platelet Count 119L

, Mean Platelet Volume 7.9, Neutrophils (%) (Auto) , Lymphocytes (%) (Auto) , 

Monocytes (%) (Auto) , Eosinophils (%) (Auto) , Basophils (%) (Auto) , Sodium 

Level 130L, Potassium Level 6.0*H, Chloride Level 94L, Carbon Dioxide Level 39H

, Anion Gap -3L, Blood Urea Nitrogen 15, Creatinine 0.6, Estimat Glomerular 

Filtration Rate > 60, Glucose Level 336#H, Calcium Level 7.8L, Total Bilirubin 

1.3H, Direct Bilirubin 0.8H, Aspartate Amino Transf (AST/SGOT) 154H, Alanine 

Aminotransferase (ALT/SGPT) 902H, Alkaline Phosphatase 115, Total Protein 6.1L, 

Albumin 2.7L, Globulin 3.4, Albumin/Globulin Ratio 0.8L, Lipase 42L, Hepatitis 

A IgM Antibody [Pending], Hepatitis B Surface Antigen [Pending], Hepatitis B 

Core IgM Antibody [Pending], Hepatitis C Antibody [Pending]


1/23/20 05:55: Potassium Level 6.3*H


1/23/20 06:34: 


Arterial Blood pH 7.218*L, Arterial Blood Partial Pressure CO2 83.8*H, Arterial 

Blood Partial Pressure O2 95.9, Arterial Blood HCO3 33.4H, Arterial Blood 

Oxygen Saturation 96.3, Arterial Blood Base Excess 2.5H, Frandy Test Positive





Current Medications








 Medications


  (Trade)  Dose


 Ordered  Sig/German


 Route


 PRN Reason  Start Time


 Stop Time Status Last Admin


Dose Admin


 


 Acetaminophen


  (Tylenol)  650 mg  Q6H  PRN


 ORAL


 Mild Pain/Temp > 100.5  1/22/20 18:00


 2/21/20 17:59   


 


 


 Albuterol/


 Ipratropium


  (Albuterol/


 Ipratropium)  3 ml  Q6HRT  PRN


 HHN


 Shortness of Breath  1/22/20 01:00


 1/27/20 00:59  1/22/20 09:50


 


 


 Aspirin


  (ASA)  81 mg  DAILY


 ORAL


   1/22/20 09:00


 2/21/20 08:59  1/22/20 08:39


 


 


 Ceftriaxone


 Sodium 1 gm/


 Dextrose  55 ml @ 


 110 mls/hr  Q24H


 IVPB


   1/22/20 16:00


 1/29/20 15:59  1/22/20 15:59


 


 


 Dextrose/Sodium


 Chloride  1,000 ml @ 


 100 mls/hr  Q10H


 IV


   1/22/20 02:15


 2/21/20 02:14  1/22/20 23:21


 


 


 Guaifenesin/


 Dextromethorphan


  (Robitussin DM


 Syrup)  5 ml  Q6H  PRN


 ORAL


 For Cough  1/22/20 18:00


 2/21/20 17:59   


 


 


 Iohexol


  (Omnipaque)  100 mg  NOW  PRN


 INJ


 Radiology Procedure  1/22/20 14:15


 1/24/20 14:05   


 


 


 Lisinopril


  (ZestriL)  2.5 mg  DAILY


 ORAL


   1/22/20 09:00


 2/21/20 08:59  1/22/20 08:39


 


 


 Methylprednisolone


 Sodium Succinate


  (Solu-MEDROL)  40 mg  EVERY 6  HOURS


 IVP


   1/22/20 15:00


 2/21/20 14:59  1/23/20 05:32


 


 


 Pantoprazole


  (Protonix)  40 mg  DAILY


 ORAL


   1/22/20 09:00


 2/21/20 08:59  1/22/20 08:39


 


 


 Quetiapine


 Fumarate


  (SEROqueL)  100 mg  QHS


 ORAL


   1/22/20 21:00


 2/21/20 20:59  1/22/20 20:23


 


 


 Risperidone


  (RisperDAL)  2 mg  QHS


 ORAL


   1/22/20 21:00


 2/21/20 20:59  1/22/20 20:23


 


 


 Trazodone HCl


  (Desyrel)  100 mg  BEDTIME


 ORAL


   1/22/20 21:00


 2/21/20 20:59  1/22/20 20:23


 

















True Duffy MD Jan 23, 2020 07:01

## 2020-01-23 NOTE — EMERGENCY ROOM REPORT
History of Present Illness


General


Chief Complaint:  Abnormal Labs


Source:  Patient, Medical Record





Present Illness


Allergies:  


Coded Allergies:  


     No Known Allergies (Unverified , 1/16/18)





Nursing Documentation-[facility]


Past Medical History:  No History, Except For


Hx Cardiac Problems:  Yes


Hx Hypertension:  Yes


Hx Asthma:  Yes


Hx Diabetes:  Yes


Hx Cancer:  No


Hx Gastrointestinal Problems:  Yes


Hx Neurological Problems:  No





Physical Exam





Vital Signs








  Date Time  Temp Pulse Resp B/P (MAP) Pulse Ox O2 Delivery O2 Flow Rate FiO2


 


1/21/20 18:36 98.1 67      


 


1/21/20 18:36   18 130/68 (88) 99 Room Air  


 


1/21/20 20:00       2.0 


 


1/22/20 08:00        36











Procedures


Intubation


Intubation :  


   Consent:  Emergent


   Time of Intubation:  15:10


   Intubation Method:  orotracheal


   Tube Size (cm):  8.0


   Medications:  Etomidate, Rocuronium


   Breath Sounds after Intubation:  equal


   Intubation Complications:  no complications


   Post Intubation Xray:  Yes


   Attempts:  One


   Patient Tolerated:  Well


   Complications:  None





Medical Decision Making


Diagnostic Impression:  


 Primary Impression:  


 Hypoglycemia


 Additional Impressions:  


 Schizophrenia


 Elevated troponin


 COPD exacerbation


 Hypoxia





Last Vital Signs








  Date Time  Temp Pulse Resp B/P (MAP) Pulse Ox O2 Delivery O2 Flow Rate FiO2


 


1/23/20 14:40  72 22  76 Facial  50


 


1/23/20 14:00    112/73 (86)    


 


1/23/20 12:42 98.6       


 


1/23/20 08:00        








Disposition:  ADMITTED AS INPATIENT


Condition:  Serious


Referrals:  


NOT CHOSEN IPA/MD,REFERRING (PCP)











Kishor Sandoval MD Jan 23, 2020 15:15

## 2020-01-23 NOTE — NUR
TRANSFER TO FLOOR:

Patient transferred to ICU room 246-I, per Dr. Duffy.   Report given to Rober RASMUSSEN RN.  
Belongings and medications given to Rober RASMUSSEN RN. Left message to patient's father, awaiting 
for call back. Patient is currently on bipap 20/5 Fio2 50%, with bilateral soft wrist 
restraints, patient still appears anxious at this time.

## 2020-01-23 NOTE — NUR
NURSE NOTES:



Dr. Duffy called back, informed MD that patient is really agitated. With orders to apply 
bilateral soft restraints and haldol IM every 6 hours PRN.

## 2020-01-23 NOTE — NUR
NURSE NOTES:



Patient is anxious, getting of bed and hitting staff members. Charge nurse aware. Patient is 
refusing bipap at this time.

## 2020-01-23 NOTE — NUR
NURSE NOTES:

Per Pasquale at lab patients Potassium is 6.0, possibly hemolyzed- STAT redraw order put in- 
will await for new results and notify MD. Giordano at Lab aware to notify phlebotomist.

## 2020-01-23 NOTE — NUR
NURSE NOTES:

While rearranging PT in bed with MILADIS Tracy at bedside to assist, PT was rearranged, 
bilateral soft wrist restraints were on, PT managed to self-extubate; immediately called RT 
for assistance, awaiting for ER MD to come and re-intubate PT. YAZAN Wagoner made aware.

## 2020-01-23 NOTE — NUR
NURSE NOTES:

NGT in good position. ETT still needs to be advanced by 4 cm. RT advanced ETT by 4 cm, now 
at level 29 per lipline. For repeat chest xray

## 2020-01-23 NOTE — DIAGNOSTIC IMAGING REPORT
Indication: Status post nasogastric tube adjustment

 

Technique: Supine view of the upper abdomen

 

Comparison: 2 hours earlier

 

Findings: The nasogastric tube appears slightly advanced, proximal port now probably

below the gastroesophageal junction.

 

Impression: Improved, now probably satisfactory, nasogastric tube position

 

This agrees with the preliminary interpretation provided overnight by [employer]

teleradiology service.

## 2020-01-23 NOTE — NUR
NURSE NOTES:

Endorsement received from MILADIS Richter. Patient very restless, trying to get up from the bed. 
With bilateral soft wrists restraints. Orally intubated with ET 8.0, 24 lipline. AC 28, 600, 
PEEP 5, 100%. With left upper arm g 20, right AC 20, right hand g20. Fentanyl drip at 
400mcg/hr just started. Condom catheter in place. Head of bed elevated. Bed locked and in 
low position. Bed alarm on. Call light within reach.

## 2020-01-23 NOTE — NUR
NURSE NOTES:

ABG results back PCO2 appears to be trending down now 100.3- pt. appears to be resting 
comfortably- no distress noted- will continue to monitor pt. and with plan of care.

## 2020-01-23 NOTE — NUR
NURSE NOTES:

Called MD Tati to report ABG results post 1 hour intubation, spoke with messaging 
service for STAT callback if possible. Will continue to monitor PT.

## 2020-01-23 NOTE — NUR
NURSE NOTES:

Left message for  DR. Duffy- regarding AM ABG results in detail and also results for 
critical lab potassium 6.3- awaiting for call back from doctor.

## 2020-01-23 NOTE — NUR
NURSE NOTES:

Called MD Tati and left message with answering service in regards to PT; PT remains 
agitated, YAZAN Wagoner made aware.

## 2020-01-23 NOTE — NUR
NURSE NOTES: Called Dr. Duffy's office and spoke with him about pt self extubation. Per 
MD, he wants the ED MD to reintubate. He also ordered morphine 2 mg q2hr and ativan 2 mg q 
2hr for sedation.

## 2020-01-23 NOTE — DIAGNOSTIC IMAGING REPORT
Indication: Status post endotracheal tube adjustment

 

Technique: One view of the chest

 

Comparison: 2 hours earlier

 

Findings: Stable high position of endotracheal tube. Nasogastric tube again

demonstrated. Lungs remain clear

 

Impression: Persistent high position of endotracheal tube, essentially unchanged

since previous study

 

This agrees with the preliminary interpretation provided overnight by [employer]

teleradiology service.

## 2020-01-23 NOTE — NUR
Pt self extubated. Pt began desaturating. Placed BVM on pt. Saturation slowly went up. 
Waited for MD to get intubated. MD Sandoval came and reintubated with an 8.0 24 @lip. Color 
change noted. Will continue to monitor.

## 2020-01-24 VITALS — DIASTOLIC BLOOD PRESSURE: 71 MMHG | SYSTOLIC BLOOD PRESSURE: 151 MMHG

## 2020-01-24 VITALS — DIASTOLIC BLOOD PRESSURE: 71 MMHG | SYSTOLIC BLOOD PRESSURE: 130 MMHG

## 2020-01-24 VITALS — SYSTOLIC BLOOD PRESSURE: 147 MMHG | DIASTOLIC BLOOD PRESSURE: 83 MMHG

## 2020-01-24 VITALS — SYSTOLIC BLOOD PRESSURE: 152 MMHG | DIASTOLIC BLOOD PRESSURE: 79 MMHG

## 2020-01-24 VITALS — DIASTOLIC BLOOD PRESSURE: 69 MMHG | SYSTOLIC BLOOD PRESSURE: 139 MMHG

## 2020-01-24 VITALS — SYSTOLIC BLOOD PRESSURE: 164 MMHG | DIASTOLIC BLOOD PRESSURE: 87 MMHG

## 2020-01-24 VITALS — DIASTOLIC BLOOD PRESSURE: 69 MMHG | SYSTOLIC BLOOD PRESSURE: 141 MMHG

## 2020-01-24 VITALS — SYSTOLIC BLOOD PRESSURE: 139 MMHG | DIASTOLIC BLOOD PRESSURE: 74 MMHG

## 2020-01-24 VITALS — DIASTOLIC BLOOD PRESSURE: 70 MMHG | SYSTOLIC BLOOD PRESSURE: 139 MMHG

## 2020-01-24 VITALS — SYSTOLIC BLOOD PRESSURE: 152 MMHG | DIASTOLIC BLOOD PRESSURE: 83 MMHG

## 2020-01-24 VITALS — SYSTOLIC BLOOD PRESSURE: 128 MMHG | DIASTOLIC BLOOD PRESSURE: 66 MMHG

## 2020-01-24 VITALS — DIASTOLIC BLOOD PRESSURE: 92 MMHG | SYSTOLIC BLOOD PRESSURE: 152 MMHG

## 2020-01-24 VITALS — SYSTOLIC BLOOD PRESSURE: 144 MMHG | DIASTOLIC BLOOD PRESSURE: 78 MMHG

## 2020-01-24 VITALS — DIASTOLIC BLOOD PRESSURE: 86 MMHG | SYSTOLIC BLOOD PRESSURE: 171 MMHG

## 2020-01-24 VITALS — DIASTOLIC BLOOD PRESSURE: 85 MMHG | SYSTOLIC BLOOD PRESSURE: 167 MMHG

## 2020-01-24 VITALS — SYSTOLIC BLOOD PRESSURE: 146 MMHG | DIASTOLIC BLOOD PRESSURE: 72 MMHG

## 2020-01-24 VITALS — DIASTOLIC BLOOD PRESSURE: 76 MMHG | SYSTOLIC BLOOD PRESSURE: 134 MMHG

## 2020-01-24 VITALS — SYSTOLIC BLOOD PRESSURE: 145 MMHG | DIASTOLIC BLOOD PRESSURE: 67 MMHG

## 2020-01-24 VITALS — DIASTOLIC BLOOD PRESSURE: 82 MMHG | SYSTOLIC BLOOD PRESSURE: 168 MMHG

## 2020-01-24 VITALS — SYSTOLIC BLOOD PRESSURE: 149 MMHG | DIASTOLIC BLOOD PRESSURE: 83 MMHG

## 2020-01-24 VITALS — SYSTOLIC BLOOD PRESSURE: 140 MMHG | DIASTOLIC BLOOD PRESSURE: 61 MMHG

## 2020-01-24 VITALS — DIASTOLIC BLOOD PRESSURE: 97 MMHG | SYSTOLIC BLOOD PRESSURE: 138 MMHG

## 2020-01-24 VITALS — DIASTOLIC BLOOD PRESSURE: 65 MMHG | SYSTOLIC BLOOD PRESSURE: 120 MMHG

## 2020-01-24 VITALS — DIASTOLIC BLOOD PRESSURE: 68 MMHG | SYSTOLIC BLOOD PRESSURE: 130 MMHG

## 2020-01-24 VITALS — SYSTOLIC BLOOD PRESSURE: 139 MMHG | DIASTOLIC BLOOD PRESSURE: 77 MMHG

## 2020-01-24 VITALS — SYSTOLIC BLOOD PRESSURE: 122 MMHG | DIASTOLIC BLOOD PRESSURE: 72 MMHG

## 2020-01-24 VITALS — SYSTOLIC BLOOD PRESSURE: 133 MMHG | DIASTOLIC BLOOD PRESSURE: 89 MMHG

## 2020-01-24 VITALS — DIASTOLIC BLOOD PRESSURE: 73 MMHG | SYSTOLIC BLOOD PRESSURE: 140 MMHG

## 2020-01-24 VITALS — SYSTOLIC BLOOD PRESSURE: 132 MMHG | DIASTOLIC BLOOD PRESSURE: 69 MMHG

## 2020-01-24 VITALS — SYSTOLIC BLOOD PRESSURE: 143 MMHG | DIASTOLIC BLOOD PRESSURE: 72 MMHG

## 2020-01-24 VITALS — DIASTOLIC BLOOD PRESSURE: 66 MMHG | SYSTOLIC BLOOD PRESSURE: 131 MMHG

## 2020-01-24 VITALS — DIASTOLIC BLOOD PRESSURE: 76 MMHG | SYSTOLIC BLOOD PRESSURE: 143 MMHG

## 2020-01-24 VITALS — DIASTOLIC BLOOD PRESSURE: 93 MMHG | SYSTOLIC BLOOD PRESSURE: 163 MMHG

## 2020-01-24 VITALS — DIASTOLIC BLOOD PRESSURE: 67 MMHG | SYSTOLIC BLOOD PRESSURE: 128 MMHG

## 2020-01-24 VITALS — SYSTOLIC BLOOD PRESSURE: 162 MMHG | DIASTOLIC BLOOD PRESSURE: 86 MMHG

## 2020-01-24 VITALS — DIASTOLIC BLOOD PRESSURE: 71 MMHG | SYSTOLIC BLOOD PRESSURE: 146 MMHG

## 2020-01-24 VITALS — DIASTOLIC BLOOD PRESSURE: 83 MMHG | SYSTOLIC BLOOD PRESSURE: 164 MMHG

## 2020-01-24 VITALS — SYSTOLIC BLOOD PRESSURE: 158 MMHG | DIASTOLIC BLOOD PRESSURE: 90 MMHG

## 2020-01-24 VITALS — DIASTOLIC BLOOD PRESSURE: 70 MMHG | SYSTOLIC BLOOD PRESSURE: 138 MMHG

## 2020-01-24 VITALS — SYSTOLIC BLOOD PRESSURE: 130 MMHG | DIASTOLIC BLOOD PRESSURE: 69 MMHG

## 2020-01-24 VITALS — DIASTOLIC BLOOD PRESSURE: 97 MMHG | SYSTOLIC BLOOD PRESSURE: 143 MMHG

## 2020-01-24 VITALS — DIASTOLIC BLOOD PRESSURE: 75 MMHG | SYSTOLIC BLOOD PRESSURE: 144 MMHG

## 2020-01-24 VITALS — DIASTOLIC BLOOD PRESSURE: 80 MMHG | SYSTOLIC BLOOD PRESSURE: 152 MMHG

## 2020-01-24 VITALS — SYSTOLIC BLOOD PRESSURE: 149 MMHG | DIASTOLIC BLOOD PRESSURE: 77 MMHG

## 2020-01-24 VITALS — DIASTOLIC BLOOD PRESSURE: 68 MMHG | SYSTOLIC BLOOD PRESSURE: 129 MMHG

## 2020-01-24 VITALS — SYSTOLIC BLOOD PRESSURE: 167 MMHG | DIASTOLIC BLOOD PRESSURE: 92 MMHG

## 2020-01-24 VITALS — SYSTOLIC BLOOD PRESSURE: 149 MMHG | DIASTOLIC BLOOD PRESSURE: 75 MMHG

## 2020-01-24 VITALS — SYSTOLIC BLOOD PRESSURE: 161 MMHG | DIASTOLIC BLOOD PRESSURE: 80 MMHG

## 2020-01-24 VITALS — SYSTOLIC BLOOD PRESSURE: 148 MMHG | DIASTOLIC BLOOD PRESSURE: 73 MMHG

## 2020-01-24 VITALS — DIASTOLIC BLOOD PRESSURE: 96 MMHG | SYSTOLIC BLOOD PRESSURE: 149 MMHG

## 2020-01-24 VITALS — DIASTOLIC BLOOD PRESSURE: 76 MMHG | SYSTOLIC BLOOD PRESSURE: 146 MMHG

## 2020-01-24 VITALS — SYSTOLIC BLOOD PRESSURE: 127 MMHG | DIASTOLIC BLOOD PRESSURE: 65 MMHG

## 2020-01-24 VITALS — SYSTOLIC BLOOD PRESSURE: 164 MMHG | DIASTOLIC BLOOD PRESSURE: 85 MMHG

## 2020-01-24 VITALS — SYSTOLIC BLOOD PRESSURE: 149 MMHG | DIASTOLIC BLOOD PRESSURE: 101 MMHG

## 2020-01-24 VITALS — DIASTOLIC BLOOD PRESSURE: 98 MMHG | SYSTOLIC BLOOD PRESSURE: 150 MMHG

## 2020-01-24 VITALS — DIASTOLIC BLOOD PRESSURE: 62 MMHG | SYSTOLIC BLOOD PRESSURE: 128 MMHG

## 2020-01-24 VITALS — SYSTOLIC BLOOD PRESSURE: 161 MMHG | DIASTOLIC BLOOD PRESSURE: 79 MMHG

## 2020-01-24 VITALS — SYSTOLIC BLOOD PRESSURE: 137 MMHG | DIASTOLIC BLOOD PRESSURE: 68 MMHG

## 2020-01-24 VITALS — DIASTOLIC BLOOD PRESSURE: 68 MMHG | SYSTOLIC BLOOD PRESSURE: 146 MMHG

## 2020-01-24 VITALS — SYSTOLIC BLOOD PRESSURE: 136 MMHG | DIASTOLIC BLOOD PRESSURE: 70 MMHG

## 2020-01-24 VITALS — DIASTOLIC BLOOD PRESSURE: 81 MMHG | SYSTOLIC BLOOD PRESSURE: 147 MMHG

## 2020-01-24 VITALS — DIASTOLIC BLOOD PRESSURE: 79 MMHG | SYSTOLIC BLOOD PRESSURE: 133 MMHG

## 2020-01-24 VITALS — DIASTOLIC BLOOD PRESSURE: 77 MMHG | SYSTOLIC BLOOD PRESSURE: 142 MMHG

## 2020-01-24 VITALS — SYSTOLIC BLOOD PRESSURE: 143 MMHG | DIASTOLIC BLOOD PRESSURE: 77 MMHG

## 2020-01-24 VITALS — DIASTOLIC BLOOD PRESSURE: 80 MMHG | SYSTOLIC BLOOD PRESSURE: 143 MMHG

## 2020-01-24 VITALS — SYSTOLIC BLOOD PRESSURE: 152 MMHG | DIASTOLIC BLOOD PRESSURE: 80 MMHG

## 2020-01-24 VITALS — DIASTOLIC BLOOD PRESSURE: 83 MMHG | SYSTOLIC BLOOD PRESSURE: 149 MMHG

## 2020-01-24 VITALS — DIASTOLIC BLOOD PRESSURE: 82 MMHG | SYSTOLIC BLOOD PRESSURE: 170 MMHG

## 2020-01-24 VITALS — SYSTOLIC BLOOD PRESSURE: 142 MMHG | DIASTOLIC BLOOD PRESSURE: 76 MMHG

## 2020-01-24 VITALS — SYSTOLIC BLOOD PRESSURE: 155 MMHG | DIASTOLIC BLOOD PRESSURE: 91 MMHG

## 2020-01-24 VITALS — SYSTOLIC BLOOD PRESSURE: 153 MMHG | DIASTOLIC BLOOD PRESSURE: 85 MMHG

## 2020-01-24 VITALS — SYSTOLIC BLOOD PRESSURE: 148 MMHG | DIASTOLIC BLOOD PRESSURE: 70 MMHG

## 2020-01-24 VITALS — SYSTOLIC BLOOD PRESSURE: 153 MMHG | DIASTOLIC BLOOD PRESSURE: 79 MMHG

## 2020-01-24 VITALS — DIASTOLIC BLOOD PRESSURE: 66 MMHG | SYSTOLIC BLOOD PRESSURE: 142 MMHG

## 2020-01-24 VITALS — SYSTOLIC BLOOD PRESSURE: 167 MMHG | DIASTOLIC BLOOD PRESSURE: 84 MMHG

## 2020-01-24 VITALS — DIASTOLIC BLOOD PRESSURE: 74 MMHG | SYSTOLIC BLOOD PRESSURE: 145 MMHG

## 2020-01-24 VITALS — SYSTOLIC BLOOD PRESSURE: 170 MMHG | DIASTOLIC BLOOD PRESSURE: 87 MMHG

## 2020-01-24 VITALS — DIASTOLIC BLOOD PRESSURE: 85 MMHG | SYSTOLIC BLOOD PRESSURE: 154 MMHG

## 2020-01-24 LAB
ADD MANUAL DIFF: NO
ALBUMIN SERPL-MCNC: 2.3 G/DL (ref 3.4–5)
ALBUMIN/GLOB SERPL: 0.9 {RATIO} (ref 1–2.7)
ALP SERPL-CCNC: 84 U/L (ref 46–116)
ALT SERPL-CCNC: 592 U/L (ref 12–78)
AMMONIA PLAS-SCNC: 54 UMOL/L (ref 11–32)
ANION GAP SERPL CALC-SCNC: 4 MMOL/L (ref 5–15)
AST SERPL-CCNC: 99 U/L (ref 15–37)
BILIRUB DIRECT SERPL-MCNC: 0.8 MG/DL (ref 0–0.3)
BILIRUB SERPL-MCNC: 1.8 MG/DL (ref 0.2–1)
BUN SERPL-MCNC: 15 MG/DL (ref 7–18)
CALCIUM SERPL-MCNC: 8.7 MG/DL (ref 8.5–10.1)
CHLORIDE SERPL-SCNC: 96 MMOL/L (ref 98–107)
CO2 SERPL-SCNC: 37 MMOL/L (ref 21–32)
CREAT SERPL-MCNC: 0.7 MG/DL (ref 0.55–1.3)
ERYTHROCYTE [DISTWIDTH] IN BLOOD BY AUTOMATED COUNT: 17.2 % (ref 11.6–14.8)
GAMMA GLUTAMYL TRANSPEPTIDASE: 14 U/L (ref 5–85)
GLOBULIN SER-MCNC: 2.7 G/DL
HCT VFR BLD CALC: 47.2 % (ref 42–52)
HGB BLD-MCNC: 14.7 G/DL (ref 14.2–18)
MCV RBC AUTO: 85 FL (ref 80–99)
PHOSPHATE SERPL-MCNC: 1 MG/DL (ref 2.5–4.9)
PLATELET # BLD: 129 K/UL (ref 150–450)
POTASSIUM SERPL-SCNC: 4.5 MMOL/L (ref 3.5–5.1)
RBC # BLD AUTO: 5.57 M/UL (ref 4.7–6.1)
SODIUM SERPL-SCNC: 137 MMOL/L (ref 136–145)
WBC # BLD AUTO: 8.6 K/UL (ref 4.8–10.8)

## 2020-01-24 RX ADMIN — METHYLPREDNISOLONE SODIUM SUCCINATE SCH MG: 40 INJECTION, POWDER, LYOPHILIZED, FOR SOLUTION INTRAMUSCULAR; INTRAVENOUS at 00:16

## 2020-01-24 RX ADMIN — NATEGLINIDE SCH MG: 60 TABLET ORAL at 11:30

## 2020-01-24 RX ADMIN — LORAZEPAM PRN MG: 2 INJECTION, SOLUTION INTRAMUSCULAR; INTRAVENOUS at 03:13

## 2020-01-24 RX ADMIN — VASOPRESSIN SCH MLS/HR: 20 INJECTION, SOLUTION INTRAMUSCULAR; SUBCUTANEOUS at 14:25

## 2020-01-24 RX ADMIN — VASOPRESSIN SCH MLS/HR: 20 INJECTION, SOLUTION INTRAMUSCULAR; SUBCUTANEOUS at 20:28

## 2020-01-24 RX ADMIN — ASPIRIN 81 MG SCH MG: 81 TABLET ORAL at 09:00

## 2020-01-24 RX ADMIN — SODIUM CHLORIDE SCH MLS/HR: 0.9 INJECTION INTRAVENOUS at 16:40

## 2020-01-24 RX ADMIN — VASOPRESSIN SCH MLS/HR: 20 INJECTION, SOLUTION INTRAMUSCULAR; SUBCUTANEOUS at 08:21

## 2020-01-24 RX ADMIN — METHYLPREDNISOLONE SODIUM SUCCINATE SCH MG: 40 INJECTION, POWDER, LYOPHILIZED, FOR SOLUTION INTRAMUSCULAR; INTRAVENOUS at 23:55

## 2020-01-24 RX ADMIN — PANTOPRAZOLE SODIUM SCH MG: 40 INJECTION, POWDER, FOR SOLUTION INTRAVENOUS at 20:29

## 2020-01-24 RX ADMIN — METHYLPREDNISOLONE SODIUM SUCCINATE SCH MG: 40 INJECTION, POWDER, LYOPHILIZED, FOR SOLUTION INTRAMUSCULAR; INTRAVENOUS at 12:33

## 2020-01-24 RX ADMIN — INSULIN DETEMIR SCH UNITS: 100 INJECTION, SOLUTION SUBCUTANEOUS at 09:30

## 2020-01-24 RX ADMIN — METHYLPREDNISOLONE SODIUM SUCCINATE SCH MG: 40 INJECTION, POWDER, LYOPHILIZED, FOR SOLUTION INTRAMUSCULAR; INTRAVENOUS at 05:48

## 2020-01-24 RX ADMIN — NITROGLYCERIN SCH PATCH: 0.4 PATCH TRANSDERMAL at 12:33

## 2020-01-24 RX ADMIN — PANTOPRAZOLE SODIUM SCH MG: 40 INJECTION, POWDER, FOR SOLUTION INTRAVENOUS at 09:27

## 2020-01-24 RX ADMIN — TRAZODONE HYDROCHLORIDE SCH MG: 100 TABLET ORAL at 20:29

## 2020-01-24 RX ADMIN — INSULIN ASPART SCH UNITS: 100 INJECTION, SOLUTION INTRAVENOUS; SUBCUTANEOUS at 20:38

## 2020-01-24 RX ADMIN — VASOPRESSIN SCH MLS/HR: 20 INJECTION, SOLUTION INTRAMUSCULAR; SUBCUTANEOUS at 04:21

## 2020-01-24 RX ADMIN — NATEGLINIDE SCH MG: 60 TABLET ORAL at 16:30

## 2020-01-24 RX ADMIN — METHYLPREDNISOLONE SODIUM SUCCINATE SCH MG: 40 INJECTION, POWDER, LYOPHILIZED, FOR SOLUTION INTRAMUSCULAR; INTRAVENOUS at 17:57

## 2020-01-24 NOTE — DIAGNOSTIC IMAGING REPORT
Indication: Post endotracheal tube adjustment

 

Technique: One view of the chest

 

Comparison: 2 hours earlier

 

Findings: Interim advancement of endotracheal tube, tip now projecting good position

approximately 4 cm above the paige. The lungs and pleural spaces are clear. The

heart size is normal. Orogastric tube also again demonstrated

 

Impression: Improved and now satisfactory position of endotracheal tube

 

This agrees with the preliminary interpretation provided overnight by [employer]

teleradiology service.

## 2020-01-24 NOTE — NUR
Social Work

This Sw met with patient, currently in the ICU, who is currently intubated, sedated and 
unresponsive. This SW spoke with the , Kerry Hill (), where 
patient was living prior to this admission ([employer] [facility], [facility]) who explains 
patient is managed by [facility]: Trang Grigsby is his  @ [phone] for decision making. Patient was alert/oriented x4 prior and making some of his own 
decisions prior. Kerry explains he was ambulatory, independent and using alcohol heavily 
prior. SW to follow regarding substance abuse when able. Kerry explains patient can return 
to their facility; unless patient requires home 02, then they cannot accept the patient and 
the [facility] will need to find other  placement for patient. Pending current progress 
at this time. Patient does not have an Advance Directive; currently is full code, full 
treatment.

## 2020-01-24 NOTE — NUR
NURSE NOTES:

Patient is resting comfortably in bed, currently sedated. RASS score -2, easily awaken by 
touch and name. Will continue to monitor patient.

## 2020-01-24 NOTE — NUR
NURSE NOTES:

Hernandez catheter inserted, 350ml urine noted. Fentanyl at 100mcg/hr, RASS -1. Sinus hemalatha on 
the monitor. Dr Murphy aware, no new order. Afebrile.

## 2020-01-24 NOTE — NUR
SI;    RESP FAILURE ETT/VENT SUPPORT,HYPOGLYCEMIA

T. 98.4 HR 59 RR 28 B/P 145/67

AC 28  FIO2 60% PEEP 5

TROP 0.482 AST 99  AMMONIA 54





IS:    CEFTRIAXONE IV

NA PHOS IV

MAGNESIUM IV

SOLU MEDROL IV

*******ICU STATUS********

## 2020-01-24 NOTE — NUR
NURSE NOTES:

Patient became awake and alert. attempted to reach for ETT tube, increased Fentanyl drip to 
180mcg/hr from 170mcg/hr. Will continue to monitor patient.

## 2020-01-24 NOTE — NUR
NURSE NOTES:

Received patient from [facility] REDDING. Patient is sedated, awaken by shaking.  Patient is Sinus 
Rhythm on the monitor HR 59. Receiving oxygen via ET tube 8, 29cm left side at the lip line, 
vent settings: AC 16, , FiO2 100%, PEEP 5. IV site is right AC 20g, patent and 
receiving Fentanyl at 200mcg/hr, Left Upper Arm 20g patent and intact. Hernandez catheter is 
intact and draining. Bed is locked, placed in lowest position, side rails up x3, bed alarm 
on, call light within reach. Will continue to monitor.

## 2020-01-24 NOTE — PROGRESS NOTE
DATE:  01/23/2020

CARDIOLOGY PROGRESS NOTE



SUBJECTIVE:  Condition is deteriorated.  Worsening respiratory acidosis and

acute respiratory failure.  He has required intubation and mechanical

ventilation.  The patient was obtunded.  ABG reveals pH 7.20, 118, and

105.



OBJECTIVE:

VITAL SIGNS:  Blood pressure 127/75, pulse 88, and respiratory rate 25.

LUNGS:  Diminished breath sounds and rhonchi.

CARDIAC:  Regular rhythm and rate.  Normal S1 and S2.

ABDOMEN:  Soft.  Moderate hepatosplenomegaly.

EXTREMITIES:  1+ dependent edema.



IMPRESSION:

1. Acute respiratory failure.

2. Acute on chronic respiratory acidosis.

3. COPD exacerbation.

4. Right heart failure.

5. Severe pulmonary hypertension.

6. Acute myocardial ischemia and non-ST elevation infarction.



PLAN:

1. Anti-lipid therapy was held yesterday.

2. Following my evaluation, the patient will require continue ventilator

support.

3. Empiric antimicrobials.

4. Adjustment of vent settings and _____ monitoring of acid-base

parameters.

5. DVT prophylaxis.

6. _____ team workup in progress.









  ______________________________________________

  Franklin Murphy M.D. DR:  LILY

D:  01/24/2020 00:58

T:  01/24/2020 03:46

JOB#:  [id number]

CC:

## 2020-01-24 NOTE — PULMONOLOGY PROGRESS NOTE
Subjective


Allergies:  


Coded Allergies:  


     No Known Allergies (Unverified , 1/16/18)


Subjective


note dictated





Objective





Last 24 Hour Vital Signs








  Date Time  Temp Pulse Resp B/P (MAP) Pulse Ox O2 Delivery O2 Flow Rate FiO2


 


1/24/20 17:00   24   Endotracheal Tube  100


 


1/24/20 17:00  59 24 120/65 (83) 95   


 


1/24/20 16:56  63 28     60


 


1/24/20 16:00 98.5 59 27 137/68 (91) 95   


 


1/24/20 16:00   27   Endotracheal Tube  100


 


1/24/20 16:00      Mechanical Ventilator  


 


1/24/20 16:00        100


 


1/24/20 15:45  60 20 129/68 (88) 95   


 


1/24/20 15:32  59      


 


1/24/20 15:30  61 24 128/62 (84) 96   


 


1/24/20 15:15  62 22 132/69 (90) 99   


 


1/24/20 15:13  64 28     60


 


1/24/20 15:00  60 23 143/97 (112) 96   


 


1/24/20 15:00   23   Endotracheal Tube  100


 


1/24/20 14:30  59 26 146/76 (99) 97   


 


1/24/20 14:25   25   Endotracheal Tube 12.0 100


 


1/24/20 14:00  59 25 134/76 (95) 96   


 


1/24/20 14:00   25   Endotracheal Tube  100


 


1/24/20 13:30  57 28 130/71 (90) 95   


 


1/24/20 13:14  60 28     60


 


1/24/20 13:00  56 27 127/65 (85) 94   


 


1/24/20 13:00   27   Endotracheal Tube  100


 


1/24/20 12:45  60 28 128/66 (86) 95   


 


1/24/20 12:33    128/67    


 


1/24/20 12:30  61 27 128/67 (87) 94   


 


1/24/20 12:15  61 28 130/68 (88) 95   


 


1/24/20 12:04        100


 


1/24/20 12:01  63      


 


1/24/20 12:00      Mechanical Ventilator  


 


1/24/20 12:00   26   Endotracheal Tube  100


 


1/24/20 12:00 98.4 60 26 131/66 (87) 95   


 


1/24/20 11:45  59 27 145/67 (93) 94   


 


1/24/20 11:30  63 22 143/80 (101) 95   


 


1/24/20 11:15  65 28 139/69 (92) 95   


 


1/24/20 11:00  68 22 143/72 (95) 95   


 


1/24/20 11:00   22   Endotracheal Tube  100


 


1/24/20 10:45  67 21 148/73 (98) 95   


 


1/24/20 10:34  119 28     60


 


1/24/20 10:30  75 15 149/75 (99) 95   


 


1/24/20 10:15  64 24 146/72 (96) 95   


 


1/24/20 10:00  64 23 136/70 (92) 95   


 


1/24/20 10:00   23   Endotracheal Tube  100


 


1/24/20 09:45  63 20 140/61 (87) 94   


 


1/24/20 09:30  65 23 138/70 (92) 94   


 


1/24/20 09:30   23   Endotracheal Tube  100


 


1/24/20 09:15  80 28 139/70 (93) 95   


 


1/24/20 09:00   23   Endotracheal Tube  100


 


1/24/20 09:00  83 23 141/69 (93) 94   


 


1/24/20 08:40        60


 


1/24/20 08:30  72 28     50


 


1/24/20 08:21   28   Mechanical Ventilator 12.0 50


 


1/24/20 08:00      Mechanical Ventilator  


 


1/24/20 08:00        100


 


1/24/20 08:00   28   Endotracheal Tube  100


 


1/24/20 08:00 98.1 60 28 140/73 (95) 91   


 


1/24/20 07:03  58      


 


1/24/20 07:00  61 23 153/85 (107) 94   


 


1/24/20 07:00   23   Endotracheal Tube  100


 


1/24/20 06:32  65 28     50


 


1/24/20 06:00   28   Mechanical Ventilator  100


 


1/24/20 06:00  58 23 142/66 (91) 96   


 


1/24/20 05:45  55 28 152/80 (104) 100   


 


1/24/20 05:30  54 28 147/81 (103) 100   


 


1/24/20 05:15  56 28 151/71 (97) 100   


 


1/24/20 05:07  73 28     50


 


1/24/20 05:00  58 28 148/70 (96) 100   


 


1/24/20 05:00   28   Mechanical Ventilator  100


 


1/24/20 04:51 97.9       


 


1/24/20 04:45  57 28 146/68 (94) 100   


 


1/24/20 04:30  58 28 146/71 (96) 100   


 


1/24/20 04:21   28   Mechanical Ventilator  100


 


1/24/20 04:15  57 21 143/76 (98) 100   


 


1/24/20 04:00  63      


 


1/24/20 04:00 97.9 57 21 149/77 (101) 100   


 


1/24/20 04:00   28   Mechanical Ventilator  100


 


1/24/20 04:00        100


 


1/24/20 04:00      Mechanical Ventilator  


 


1/24/20 03:45  57 12 153/79 (103) 100   


 


1/24/20 03:30  67 21 162/86 (111) 96   


 


1/24/20 03:15  74 20 143/77 (99) 99   


 


1/24/20 03:13  73 28 138/97 (111) 100   


 


1/24/20 03:05  69 28     50


 


1/24/20 03:00  54 28 155/91 (112) 100   


 


1/24/20 03:00   28   Mechanical Ventilator  100


 


1/24/20 02:45  62 28 152/83 (106) 100   


 


1/24/20 02:30  57 28 147/83 (104) 100   


 


1/24/20 02:15  64 28 152/79 (103) 100   


 


1/24/20 02:00  61 28 149/83 (105) 100   


 


1/24/20 02:00   28   Mechanical Ventilator  100


 


1/24/20 01:45  68 28 152/80 (104) 100   


 


1/24/20 01:30  59 28 149/83 (105) 100   


 


1/24/20 01:15  55 28 144/78 (100) 100   


 


1/24/20 01:03  76 26     50


 


1/24/20 01:00   28   Mechanical Ventilator  100


 


1/24/20 01:00  55 28 142/77 (98) 99   


 


1/24/20 00:45  56 28 142/76 (98) 99   


 


1/24/20 00:30  55 28 139/74 (95) 99   


 


1/24/20 00:15  56 27 139/77 (97) 99   


 


1/24/20 00:00 97.9 57 28 144/75 (98) 100   


 


1/24/20 00:00   28   Mechanical Ventilator  100


 


1/24/20 00:00      Mechanical Ventilator  


 


1/24/20 00:00  54      


 


1/24/20 00:00        100


 


1/23/20 23:45  57 23 163/85 (111) 99   


 


1/23/20 23:30  58 28 147/83 (104) 100   


 


1/23/20 23:15  64 28 143/77 (99) 99   


 


1/23/20 23:06  78 24     50


 


1/23/20 23:00   28   Mechanical Ventilator  100


 


1/23/20 23:00  57 27 144/86 (105) 98   


 


1/23/20 22:45  63 28 131/71 (91) 98   


 


1/23/20 22:45   28   Endotracheal Tube  100


 


1/23/20 22:30  60 28 128/65 (86) 99   


 


1/23/20 22:15  60 28 113/58 (76) 99   


 


1/23/20 22:00      Mechanical Ventilator  100


 


1/23/20 22:00  60 28 110/57 (74) 99   


 


1/23/20 21:45  100 22 128/72 (90) 99   


 


1/23/20 21:30  60 28 109/59 (76) 98   


 


1/23/20 21:18        100


 


1/23/20 21:15  63 20 127/71 (89) 100   


 


1/23/20 21:04  79 26     50


 


1/23/20 21:00   28   Mechanical Ventilator  100


 


1/23/20 21:00  59 18 104/57 (73) 100   


 


1/23/20 20:45  62 18 105/57 (73) 100   


 


1/23/20 20:30  69 18 108/65 (79) 100   


 


1/23/20 20:15  63 18 103/51 (68) 100   


 


1/23/20 20:00  63      


 


1/23/20 20:00        100


 


1/23/20 20:00   28   Mechanical Ventilator  100


 


1/23/20 20:00 97.7 123 18 119/66 (83) 100   


 


1/23/20 20:00      Mechanical Ventilator  


 


1/23/20 19:48  65 28 94/50 (65) 99   


 


1/23/20 19:45  63 28 88/53 (65) 99   


 


1/23/20 19:30  87 26 99/59 (72) 97   


 


1/23/20 19:29  75 27 93/54 (67) 100   


 


1/23/20 19:21   21   Endotracheal Tube  100


 


1/23/20 19:15  131 27  99   


 


1/23/20 19:01     100 Mechanical Ventilator  


 


1/23/20 19:01  77 28     50


 


1/23/20 19:00  73 25  95   


 


1/23/20 18:50 98.5       


 


1/23/20 18:00  67 21 115/74 (88) 100   

















Intake and Output  


 


 1/23/20 1/24/20





 19:00 07:00


 


Intake Total  300 ml


 


Output Total 0 ml 730 ml


 


Balance 0 ml -430 ml


 


  


 


Intake IV Total  300 ml


 


Output Urine Total 0 ml 730 ml











Microbiology








 Date/Time


Source Procedure


Growth Status


 


 


 1/22/20 00:05


Rectum - Final


NO CARBAPENEM-RESISTANT ENTEROBACTERI... Complete


 


 1/22/20 00:05


Rectum VRE Culture - Final


NO VANCOMYCIN RESISTANT ENTEROCOCCUS ... Complete








Laboratory Tests


1/24/20 05:00: 


White Blood Count 8.6, Red Blood Count 5.57, Hemoglobin 14.7, Hematocrit 47.2, 

Mean Corpuscular Volume 85, Mean Corpuscular Hemoglobin 26.4L, Mean Corpuscular 

Hemoglobin Concent 31.3L, Red Cell Distribution Width 17.2H, Platelet Count 129L

, Mean Platelet Volume 7.3, Neutrophils (%) (Auto) , Lymphocytes (%) (Auto) , 

Monocytes (%) (Auto) , Eosinophils (%) (Auto) , Basophils (%) (Auto) , Sodium 

Level 137, Potassium Level 4.5, Chloride Level 96L, Carbon Dioxide Level 37H, 

Anion Gap 4L, Blood Urea Nitrogen 15, Creatinine 0.7, Estimat Glomerular 

Filtration Rate > 60, Glucose Level 235#H, Hemoglobin A1c [Pending], Lactic 

Acid Level 1.30, Uric Acid 6.3, Calcium Level 8.7, Phosphorus Level 1.0L, 

Magnesium Level 1.2L, Total Bilirubin 1.8H, Direct Bilirubin 0.8H, Gamma 

Glutamyl Transpeptidase 14, Aspartate Amino Transf (AST/SGOT) 99H, Alanine 

Aminotransferase (ALT/SGPT) 592H, Alkaline Phosphatase 84, Ammonia 54H, 

Troponin I 0.482H, C-Reactive Protein, Quantitative 1.3H, Pro-B-Type 

Natriuretic Peptide 1282H, Total Protein 5.0L, Albumin 2.3L, Globulin 2.7, 

Albumin/Globulin Ratio 0.9L


1/24/20 08:30: 


Arterial Blood pH 7.563*H, Arterial Blood Partial Pressure CO2 44.1, Arterial 

Blood Partial Pressure O2 50.9L, Arterial Blood HCO3 38.9H, Arterial Blood 

Oxygen Saturation 91.3L, Arterial Blood Base Excess 14.9*H, Frandy Test Positive





Current Medications








 Medications


  (Trade)  Dose


 Ordered  Sig/German


 Route


 PRN Reason  Start Time


 Stop Time Status Last Admin


Dose Admin


 


 Acetaminophen


  (Tylenol)  650 mg  Q6H  PRN


 ORAL


 Mild Pain/Temp > 100.5  1/23/20 12:00


 2/21/20 17:59   


 


 


 Albuterol/


 Ipratropium


  (Albuterol/


 Ipratropium)  3 ml  Q6HRT  PRN


 HHN


 Shortness of Breath  1/23/20 13:00


 1/27/20 00:59  1/23/20 15:54


 


 


 Aspirin


  (ASA)  81 mg  DAILY


 ORAL


   1/24/20 09:00


 2/21/20 08:59   


 


 


 Ceftriaxone


 Sodium 1 gm/


 Sodium Chloride  55 ml @ 


 110 mls/hr  Q24H


 IVPB


   1/24/20 16:00


 1/30/20 15:59  1/24/20 16:40


 


 


 Dextrose


  (Dextrose 50%)  25 ml  Q30M  PRN


 IV


 Hypoglycemia  1/24/20 07:15


 2/23/20 07:14   


 


 


 Dextrose


  (Dextrose 50%)  50 ml  Q30M  PRN


 IV


 Hypoglycemia  1/24/20 07:15


 2/23/20 07:14   


 


 


 Fentanyl Citrate


 1000 mcg/Sodium


 Chloride  100 ml @ 0


 mls/hr  Q24H


 IV


   1/23/20 19:30


 1/30/20 19:29  1/24/20 14:25


 


 


 Guaifenesin/


 Dextromethorphan


  (Robitussin DM


 Syrup)  5 ml  Q6H  PRN


 ORAL


 For Cough  1/23/20 12:00


 2/21/20 17:59   


 


 


 Haloperidol


 Lactate


  (Haldol)  5 mg  Q4H  PRN


 IM


 Agitation  1/23/20 13:00


 2/22/20 12:59  1/23/20 16:49


 


 


 Insulin Aspart


  (NovoLOG)    BEFORE MEALS AND  HS


 SUBQ


   1/24/20 21:00


 2/23/20 20:59   


 


 


 Insulin Detemir


  (Levemir)  10 units  DAILY


 SUBQ


   1/24/20 09:00


 2/23/20 08:59  1/24/20 09:30


 


 


 Lorazepam


  (Ativan 2mg/ml


 1ml)  2 mg  Q2H  PRN


 IV


 For Anxiety  1/23/20 17:15


 1/30/20 17:14  1/24/20 03:13


 


 


 Methylprednisolone


 Sodium Succinate


  (Solu-MEDROL)  40 mg  EVERY 6  HOURS


 IVP


   1/23/20 12:00


 2/21/20 14:59  1/24/20 12:33


 


 


 Morphine Sulfate


  (Morphine


 Sulfate)  2 mg  Q2H  PRN


 IVP


 For Pain  1/23/20 17:15


 1/30/20 17:14  1/23/20 18:03


 


 


 Nitroglycerin


  (Ntg)  1 patch  Q24H


 TDERMAL


   1/23/20 13:00


 2/22/20 12:59  1/24/20 12:33


 


 


 Pantoprazole


  (Protonix)  40 mg  EVERY 12  HOURS


 IVP


   1/23/20 21:00


 2/22/20 20:59  1/24/20 09:27


 


 


 Risperidone


  (RisperDAL)  2 mg  QHS


 ORAL


   1/23/20 21:00


 2/21/20 20:59  1/23/20 22:07


 


 


 Sodium Phosphate


 30 mm/Sodium


 Chloride  285 ml @ 


 47.5 mls/hr  ONCE  ONCE


 IVPB


   1/24/20 13:00


 1/24/20 18:59  1/24/20 17:18


 


 


 Trazodone HCl


  (Desyrel)  100 mg  BEDTIME


 ORAL


   1/23/20 21:00


 2/21/20 20:59  1/23/20 22:04


 

















Marcos Jama MD Jan 24, 2020 17:56

## 2020-01-24 NOTE — NUR
RESPIRATORY NOTE:

Received pt on AC 28-600ml-50%FiO2-peep 5. Pt is sedated, intubated with ETT size 8.0 @ 
29cm lip line, secured by anchor fast. No SOB or resp distress noted. Alarms are set and 
audible, vent is plugged into the red outlet, ambu bag is at bedside. Will continue to 
monitor.

## 2020-01-24 NOTE — NUR
NURSE NOTES:

Patient is resting calmly in bed, RASS score -2, easily awaken's by name and touch for 
approximately less than 10 seconds. Will continue to monitor.

## 2020-01-24 NOTE — GENERAL PROGRESS NOTE
Assessment/Plan


Problem List:  


(1) Diabetes mellitus


ICD Codes:  E11.9 - Type 2 diabetes mellitus without complications


SNOMED:  25572977


(2) Hypoglycemia


ICD Codes:  E16.2 - Hypoglycemia, unspecified


SNOMED:  763356551


(3) Schizophrenia


ICD Codes:  F20.9 - Schizophrenia, unspecified


SNOMED:  16139719


Qualifiers:  


   Qualified Codes:  F20.9 - Schizophrenia, unspecified


(4) Elevated troponin


ICD Codes:  R79.89 - Other specified abnormal findings of blood chemistry


SNOMED:  557999438, 699345174, 265415886


(5) COPD exacerbation


ICD Codes:  J44.1 - Chronic obstructive pulmonary disease with (acute) 

exacerbation


SNOMED:  635962626


(6) Abnormal LFTs


ICD Codes:  R94.5 - Abnormal results of liver function studies


SNOMED:  430577372


Status:  unchanged


Assessment/Plan:


add Levemir 10 units daily 


add Starlix 60 mg ac tid 


continue Novolog sliding scale ac / hs





Subjective


Allergies:  


Coded Allergies:  


     No Known Allergies (Unverified , 1/16/18)


All Systems:  reviewed and negative except above


Subjective


events noted 


glucose values are still on higher side 











Item Value  Date Time


 


Bedside Blood Glucose 251 mg/dl H 1/24/20 0604


 


Bedside Blood Glucose 225 mg/dl H 1/23/20 2100


 


Bedside Blood Glucose 212 mg/dl H 1/23/20 1630


 


Bedside Blood Glucose 224 mg/dl H 1/23/20 1130


 


Bedside Blood Glucose 339 mg/dl H 1/23/20 0548











Objective





Last 24 Hour Vital Signs








  Date Time  Temp Pulse Resp B/P (MAP) Pulse Ox O2 Delivery O2 Flow Rate FiO2


 


1/24/20 06:00   28   Mechanical Ventilator  100


 


1/24/20 06:00  58 23 142/66 (91) 96   


 


1/24/20 05:45  55 28 152/80 (104) 100   


 


1/24/20 05:30  54 28 147/81 (103) 100   


 


1/24/20 05:15  56 28 151/71 (97) 100   


 


1/24/20 05:07  73 28     50


 


1/24/20 05:00  58 28 148/70 (96) 100   


 


1/24/20 05:00   28   Mechanical Ventilator  100


 


1/24/20 04:51 97.9       


 


1/24/20 04:45  57 28 146/68 (94) 100   


 


1/24/20 04:30  58 28 146/71 (96) 100   


 


1/24/20 04:21   28   Mechanical Ventilator  100


 


1/24/20 04:15  57 21 143/76 (98) 100   


 


1/24/20 04:00  63      


 


1/24/20 04:00 97.9 57 21 149/77 (101) 100   


 


1/24/20 04:00   28   Mechanical Ventilator  100


 


1/24/20 04:00        100


 


1/24/20 04:00      Mechanical Ventilator  


 


1/24/20 03:45  57 12 153/79 (103) 100   


 


1/24/20 03:30  67 21 162/86 (111) 96   


 


1/24/20 03:15  74 20 143/77 (99) 99   


 


1/24/20 03:13  73 28 138/97 (111) 100   


 


1/24/20 03:05  69 28     50


 


1/24/20 03:00  54 28 155/91 (112) 100   


 


1/24/20 03:00   28   Mechanical Ventilator  100


 


1/24/20 02:45  62 28 152/83 (106) 100   


 


1/24/20 02:30  57 28 147/83 (104) 100   


 


1/24/20 02:15  64 28 152/79 (103) 100   


 


1/24/20 02:00  61 28 149/83 (105) 100   


 


1/24/20 02:00   28   Mechanical Ventilator  100


 


1/24/20 01:45  68 28 152/80 (104) 100   


 


1/24/20 01:30  59 28 149/83 (105) 100   


 


1/24/20 01:15  55 28 144/78 (100) 100   


 


1/24/20 01:03  76 26     50


 


1/24/20 01:00   28   Mechanical Ventilator  100


 


1/24/20 01:00  55 28 142/77 (98) 99   


 


1/24/20 00:45  56 28 142/76 (98) 99   


 


1/24/20 00:30  55 28 139/74 (95) 99   


 


1/24/20 00:15  56 27 139/77 (97) 99   


 


1/24/20 00:00 97.9 57 28 144/75 (98) 100   


 


1/24/20 00:00   28   Mechanical Ventilator  100


 


1/24/20 00:00      Mechanical Ventilator  


 


1/24/20 00:00  54      


 


1/24/20 00:00        100


 


1/23/20 23:45  57 23 163/85 (111) 99   


 


1/23/20 23:30  58 28 147/83 (104) 100   


 


1/23/20 23:15  64 28 143/77 (99) 99   


 


1/23/20 23:06  78 24     50


 


1/23/20 23:00   28   Mechanical Ventilator  100


 


1/23/20 23:00  57 27 144/86 (105) 98   


 


1/23/20 22:45  63 28 131/71 (91) 98   


 


1/23/20 22:45   28   Endotracheal Tube  100


 


1/23/20 22:30  60 28 128/65 (86) 99   


 


1/23/20 22:15  60 28 113/58 (76) 99   


 


1/23/20 22:00      Mechanical Ventilator  100


 


1/23/20 22:00  60 28 110/57 (74) 99   


 


1/23/20 21:45  100 22 128/72 (90) 99   


 


1/23/20 21:30  60 28 109/59 (76) 98   


 


1/23/20 21:18        100


 


1/23/20 21:15  63 20 127/71 (89) 100   


 


1/23/20 21:04  79 26     50


 


1/23/20 21:00   28   Mechanical Ventilator  100


 


1/23/20 21:00  59 18 104/57 (73) 100   


 


1/23/20 20:45  62 18 105/57 (73) 100   


 


1/23/20 20:30  69 18 108/65 (79) 100   


 


1/23/20 20:15  63 18 103/51 (68) 100   


 


1/23/20 20:00  63      


 


1/23/20 20:00        100


 


1/23/20 20:00   28   Mechanical Ventilator  100


 


1/23/20 20:00 97.7 123 18 119/66 (83) 100   


 


1/23/20 20:00      Mechanical Ventilator  


 


1/23/20 19:48  65 28 94/50 (65) 99   


 


1/23/20 19:45  63 28 88/53 (65) 99   


 


1/23/20 19:30  87 26 99/59 (72) 97   


 


1/23/20 19:29  75 27 93/54 (67) 100   


 


1/23/20 19:21   21   Endotracheal Tube  100


 


1/23/20 19:15  131 27  99   


 


1/23/20 19:01     100 Mechanical Ventilator  


 


1/23/20 19:01  77 28     50


 


1/23/20 19:00  73 25  95   


 


1/23/20 18:50 98.5       


 


1/23/20 18:00  67 21 115/74 (88) 100   


 


1/23/20 17:08  71 24     50


 


1/23/20 17:00  77 11 116/70 (85) 91   


 


1/23/20 16:00      Endotracheal Tube  


 


1/23/20 16:00  78      


 


1/23/20 16:00        100


 


1/23/20 16:00 98.5 86 18 131/78 (95) 94   


 


1/23/20 15:54  79 21  97 Mechanical Ventilator  45


 


1/23/20 15:15  86 20     50


 


1/23/20 15:00  74 18 118/75 (89) 97   


 


1/23/20 14:40  72 22  76 Facial  50


 


1/23/20 14:00  78 16 112/73 (86) 96   


 


1/23/20 13:19  77 18  76 Facial  50


 


1/23/20 13:00    123/72    


 


1/23/20 13:00  77 15 121/72 (88) 96   


 


1/23/20 12:42 98.6 78 17 123/72 (89)    


 


1/23/20 12:00        50


 


1/23/20 12:00      Bi-pap  


 


1/23/20 11:25  76      


 


1/23/20 10:58  73 21  95 Facial  50


 


1/23/20 09:18  88 25  96 Facial  50


 


1/23/20 09:00    127/75    


 


1/23/20 08:03  93      


 


1/23/20 08:00 97.0 61 18 127/75 (92) 92   


 


1/23/20 08:00      Bi-pap  


 


1/23/20 08:00        50

















Intake and Output  


 


 1/23/20 1/24/20





 19:00 07:00


 


Intake Total  270 ml


 


Output Total 0 ml 580 ml


 


Balance 0 ml -310 ml


 


  


 


Intake IV Total  270 ml


 


Output Urine Total 0 ml 580 ml








Laboratory Tests


1/23/20 11:00: 


Arterial Blood pH 7.201*L, Arterial Blood Partial Pressure CO2 123.5*H, 

Arterial Blood Partial Pressure O2 96.8, Arterial Blood HCO3 47.3*H, Arterial 

Blood Oxygen Saturation 96.1, Arterial Blood Base Excess 13.1*H, Frandy Test 

Positive


1/23/20 13:56: 


Arterial Blood pH 7.216*L, Arterial Blood Partial Pressure CO2 119.9*H, 

Arterial Blood Partial Pressure O2 81.8, Arterial Blood HCO3 47.5*H, Arterial 

Blood Oxygen Saturation 95.3, Arterial Blood Base Excess 13.7*H, Frandy Test 

Positive


1/23/20 16:14: 


Arterial Blood pH 7.209*L, Arterial Blood Partial Pressure CO2 118.2*H, 

Arterial Blood Partial Pressure O2 105.9H, Arterial Blood HCO3 46.1*H, Arterial 

Blood Oxygen Saturation 97.6, Arterial Blood Base Excess 12.1*H, Frandy Test 

Positive


1/24/20 05:00: 


White Blood Count 8.6, Red Blood Count 5.57, Hemoglobin 14.7, Hematocrit 47.2, 

Mean Corpuscular Volume 85, Mean Corpuscular Hemoglobin 26.4L, Mean Corpuscular 

Hemoglobin Concent 31.3L, Red Cell Distribution Width 17.2H, Platelet Count 129L

, Mean Platelet Volume 7.3, Neutrophils (%) (Auto) , Lymphocytes (%) (Auto) , 

Monocytes (%) (Auto) , Eosinophils (%) (Auto) , Basophils (%) (Auto) , Sodium 

Level [Pending], Potassium Level [Pending], Chloride Level [Pending], Carbon 

Dioxide Level [Pending], Blood Urea Nitrogen [Pending], Creatinine [Pending], 

Estimat Glomerular Filtration Rate [Pending], Glucose Level [Pending], Lactic 

Acid Level [Pending], Uric Acid [Pending], Calcium Level [Pending], Phosphorus 

Level [Pending], Magnesium Level [Pending], Total Bilirubin [Pending], Gamma 

Glutamyl Transpeptidase [Pending], Aspartate Amino Transf (AST/SGOT) [Pending], 

Alanine Aminotransferase (ALT/SGPT) [Pending], Alkaline Phosphatase [Pending], 

Ammonia [Pending], Troponin I [Pending], C-Reactive Protein, Quantitative [

Pending], Pro-B-Type Natriuretic Peptide [Pending], Total Protein [Pending], 

Albumin [Pending], Globulin [Pending]


Height (Feet):  6


Height (Inches):  3.00


Weight (Pounds):  235


General Appearance:  no apparent distress


Neck:  normal alignment


Cardiovascular:  normal rate


Respiratory/Chest:  lungs clear


Abdomen:  normal bowel sounds


Objective





Current Medications








 Medications


  (Trade)  Dose


 Ordered  Sig/German


 Route


 PRN Reason  Start Time


 Stop Time Status Last Admin


Dose Admin


 


 Acetaminophen


  (Tylenol)  650 mg  Q6H  PRN


 ORAL


 Mild Pain/Temp > 100.5  1/23/20 12:00


 2/21/20 17:59   


 


 


 Albuterol/


 Ipratropium


  (Albuterol/


 Ipratropium)  3 ml  Q6HRT  PRN


 HHN


 Shortness of Breath  1/23/20 13:00


 1/27/20 00:59  1/23/20 15:54


 


 


 Aspirin


  (ASA)  81 mg  DAILY


 ORAL


   1/24/20 09:00


 2/21/20 08:59   


 


 


 Ceftriaxone


 Sodium 1 gm/


 Dextrose  55 ml @ 


 110 mls/hr  Q24H


 IVPB


   1/23/20 16:00


 1/29/20 15:59  1/23/20 16:39


 


 


 Fentanyl Citrate


 1000 mcg/Sodium


 Chloride  100 ml @ 0


 mls/hr  Q24H


 IV


   1/23/20 19:30


 1/30/20 19:29  1/24/20 04:21


 


 


 Guaifenesin/


 Dextromethorphan


  (Robitussin DM


 Syrup)  5 ml  Q6H  PRN


 ORAL


 For Cough  1/23/20 12:00


 2/21/20 17:59   


 


 


 Haloperidol


 Lactate


  (Haldol)  5 mg  Q4H  PRN


 IM


 Agitation  1/23/20 13:00


 2/22/20 12:59  1/23/20 16:49


 


 


 Iohexol


  (Omnipaque)  100 mg  NOW  PRN


 INJ


 Radiology Procedure  1/23/20 14:15


 1/24/20 14:05   


 


 


 Lorazepam


  (Ativan 2mg/ml


 1ml)  2 mg  Q2H  PRN


 IV


 For Anxiety  1/23/20 17:15


 1/30/20 17:14  1/24/20 03:13


 


 


 Methylprednisolone


 Sodium Succinate


  (Solu-MEDROL)  40 mg  EVERY 6  HOURS


 IVP


   1/23/20 12:00


 2/21/20 14:59  1/24/20 05:48


 


 


 Morphine Sulfate


  (Morphine


 Sulfate)  2 mg  Q2H  PRN


 IVP


 For Pain  1/23/20 17:15


 1/30/20 17:14  1/23/20 18:03


 


 


 Nitroglycerin


  (Ntg)  1 patch  Q24H


 TDERMAL


   1/23/20 13:00


 2/22/20 12:59  1/23/20 13:00


 


 


 Pantoprazole


  (Protonix)  40 mg  EVERY 12  HOURS


 IVP


   1/23/20 21:00


 2/22/20 20:59  1/23/20 22:04


 


 


 Risperidone


  (RisperDAL)  2 mg  QHS


 ORAL


   1/23/20 21:00


 2/21/20 20:59  1/23/20 22:07


 


 


 Trazodone HCl


  (Desyrel)  100 mg  BEDTIME


 ORAL


   1/23/20 21:00


 2/21/20 20:59  1/23/20 22:04


 

















Nathen Joiner MD Jan 24, 2020 07:08

## 2020-01-24 NOTE — NUR
NURSE NOTES:

Spoke to Dr. Jama regarding Ammonia level, no orders received. Will continue to monitor 
patient.

## 2020-01-24 NOTE — GENERAL PROGRESS NOTE
Assessment/Plan


Problem List:  


(1) Acute respiratory failure


ICD Codes:  J96.00 - Acute respiratory failure, unspecified whether with 

hypoxia or hypercapnia


SNOMED:  72640004


(2) Electrolyte imbalance


ICD Codes:  E87.8 - Other disorders of electrolyte and fluid balance, not 

elsewhere classified


SNOMED:  461411047


(3) Elevated troponin


ICD Codes:  R79.89 - Other specified abnormal findings of blood chemistry


SNOMED:  103753343, 901280362, 131643131


(4) Diabetes mellitus


ICD Codes:  E11.9 - Type 2 diabetes mellitus without complications


SNOMED:  48144125


Status:  unchanged


Status Narrative





HyperKalemia- no clear cut etiology . ? acidosis- RESOLVED


Acute respiratory failure


periodic hypoglycemia


Metabolic encephalopathy.


Chronic obstructive pulmonary disease with exacerbation. Now on BiPAP


Acute myocardial infarction.


Hyponatremia.


Hypomagnesemia.


Transaminitis.


Acute on chronic congestive heart failure, possibly systolic and diastolic. j 

Fx 50%


Assessment/Plan:


int med coverage for Dr Tati gonzalez and phos IV


per consultants


Monitor renal parameters


Per consultants





Subjective


Date patient seen:  Jan 24, 2020


Time patient seen:  09:00


ROS Limited/Unobtainable:  Yes


HEENT:  Reports: other - intubated on Vent


Allergies:  


Coded Allergies:  


     No Known Allergies (Unverified , 1/16/18)





Objective





Last 24 Hour Vital Signs








  Date Time  Temp Pulse Resp B/P (MAP) Pulse Ox O2 Delivery O2 Flow Rate FiO2


 


1/24/20 08:30  72 28     50


 


1/24/20 08:21   28   Mechanical Ventilator 12.0 50


 


1/24/20 06:32  65 28     50


 


1/24/20 06:00   28   Mechanical Ventilator  100


 


1/24/20 06:00  58 23 142/66 (91) 96   


 


1/24/20 05:45  55 28 152/80 (104) 100   


 


1/24/20 05:30  54 28 147/81 (103) 100   


 


1/24/20 05:15  56 28 151/71 (97) 100   


 


1/24/20 05:07  73 28     50


 


1/24/20 05:00  58 28 148/70 (96) 100   


 


1/24/20 05:00   28   Mechanical Ventilator  100


 


1/24/20 04:51 97.9       


 


1/24/20 04:45  57 28 146/68 (94) 100   


 


1/24/20 04:30  58 28 146/71 (96) 100   


 


1/24/20 04:21   28   Mechanical Ventilator  100


 


1/24/20 04:15  57 21 143/76 (98) 100   


 


1/24/20 04:00  63      


 


1/24/20 04:00 97.9 57 21 149/77 (101) 100   


 


1/24/20 04:00   28   Mechanical Ventilator  100


 


1/24/20 04:00        100


 


1/24/20 04:00      Mechanical Ventilator  


 


1/24/20 03:45  57 12 153/79 (103) 100   


 


1/24/20 03:30  67 21 162/86 (111) 96   


 


1/24/20 03:15  74 20 143/77 (99) 99   


 


1/24/20 03:13  73 28 138/97 (111) 100   


 


1/24/20 03:05  69 28     50


 


1/24/20 03:00  54 28 155/91 (112) 100   


 


1/24/20 03:00   28   Mechanical Ventilator  100


 


1/24/20 02:45  62 28 152/83 (106) 100   


 


1/24/20 02:30  57 28 147/83 (104) 100   


 


1/24/20 02:15  64 28 152/79 (103) 100   


 


1/24/20 02:00  61 28 149/83 (105) 100   


 


1/24/20 02:00   28   Mechanical Ventilator  100


 


1/24/20 01:45  68 28 152/80 (104) 100   


 


1/24/20 01:30  59 28 149/83 (105) 100   


 


1/24/20 01:15  55 28 144/78 (100) 100   


 


1/24/20 01:03  76 26     50


 


1/24/20 01:00   28   Mechanical Ventilator  100


 


1/24/20 01:00  55 28 142/77 (98) 99   


 


1/24/20 00:45  56 28 142/76 (98) 99   


 


1/24/20 00:30  55 28 139/74 (95) 99   


 


1/24/20 00:15  56 27 139/77 (97) 99   


 


1/24/20 00:00 97.9 57 28 144/75 (98) 100   


 


1/24/20 00:00   28   Mechanical Ventilator  100


 


1/24/20 00:00      Mechanical Ventilator  


 


1/24/20 00:00  54      


 


1/24/20 00:00        100


 


1/23/20 23:45  57 23 163/85 (111) 99   


 


1/23/20 23:30  58 28 147/83 (104) 100   


 


1/23/20 23:15  64 28 143/77 (99) 99   


 


1/23/20 23:06  78 24     50


 


1/23/20 23:00   28   Mechanical Ventilator  100


 


1/23/20 23:00  57 27 144/86 (105) 98   


 


1/23/20 22:45  63 28 131/71 (91) 98   


 


1/23/20 22:45   28   Endotracheal Tube  100


 


1/23/20 22:30  60 28 128/65 (86) 99   


 


1/23/20 22:15  60 28 113/58 (76) 99   


 


1/23/20 22:00      Mechanical Ventilator  100


 


1/23/20 22:00  60 28 110/57 (74) 99   


 


1/23/20 21:45  100 22 128/72 (90) 99   


 


1/23/20 21:30  60 28 109/59 (76) 98   


 


1/23/20 21:18        100


 


1/23/20 21:15  63 20 127/71 (89) 100   


 


1/23/20 21:04  79 26     50


 


1/23/20 21:00   28   Mechanical Ventilator  100


 


1/23/20 21:00  59 18 104/57 (73) 100   


 


1/23/20 20:45  62 18 105/57 (73) 100   


 


1/23/20 20:30  69 18 108/65 (79) 100   


 


1/23/20 20:15  63 18 103/51 (68) 100   


 


1/23/20 20:00  63      


 


1/23/20 20:00        100


 


1/23/20 20:00   28   Mechanical Ventilator  100


 


1/23/20 20:00 97.7 123 18 119/66 (83) 100   


 


1/23/20 20:00      Mechanical Ventilator  


 


1/23/20 19:48  65 28 94/50 (65) 99   


 


1/23/20 19:45  63 28 88/53 (65) 99   


 


1/23/20 19:30  87 26 99/59 (72) 97   


 


1/23/20 19:29  75 27 93/54 (67) 100   


 


1/23/20 19:21   21   Endotracheal Tube  100


 


1/23/20 19:15  131 27  99   


 


1/23/20 19:01     100 Mechanical Ventilator  


 


1/23/20 19:01  77 28     50


 


1/23/20 19:00  73 25  95   


 


1/23/20 18:50 98.5       


 


1/23/20 18:00  67 21 115/74 (88) 100   


 


1/23/20 17:08  71 24     50


 


1/23/20 17:00  77 11 116/70 (85) 91   


 


1/23/20 16:00      Endotracheal Tube  


 


1/23/20 16:00  78      


 


1/23/20 16:00        100


 


1/23/20 16:00 98.5 86 18 131/78 (95) 94   


 


1/23/20 15:54  79 21  97 Mechanical Ventilator  45


 


1/23/20 15:15  86 20     50


 


1/23/20 15:00  74 18 118/75 (89) 97   


 


1/23/20 14:40  72 22  76 Facial  50


 


1/23/20 14:00  78 16 112/73 (86) 96   


 


1/23/20 13:19  77 18  76 Facial  50


 


1/23/20 13:00    123/72    


 


1/23/20 13:00  77 15 121/72 (88) 96   


 


1/23/20 12:42 98.6 78 17 123/72 (89)    


 


1/23/20 12:00        50


 


1/23/20 12:00      Bi-pap  


 


1/23/20 11:25  76      


 


1/23/20 10:58  73 21  95 Facial  50

















Intake and Output  


 


 1/23/20 1/24/20





 19:00 07:00


 


Intake Total  270 ml


 


Output Total 0 ml 580 ml


 


Balance 0 ml -310 ml


 


  


 


Intake IV Total  270 ml


 


Output Urine Total 0 ml 580 ml








Laboratory Tests


1/23/20 11:00: 


Arterial Blood pH 7.201*L, Arterial Blood Partial Pressure CO2 123.5*H, 

Arterial Blood Partial Pressure O2 96.8, Arterial Blood HCO3 47.3*H, Arterial 

Blood Oxygen Saturation 96.1, Arterial Blood Base Excess 13.1*H, Frandy Test 

Positive


1/23/20 13:56: 


Arterial Blood pH 7.216*L, Arterial Blood Partial Pressure CO2 119.9*H, 

Arterial Blood Partial Pressure O2 81.8, Arterial Blood HCO3 47.5*H, Arterial 

Blood Oxygen Saturation 95.3, Arterial Blood Base Excess 13.7*H, Frandy Test 

Positive


1/23/20 16:14: 


Arterial Blood pH 7.209*L, Arterial Blood Partial Pressure CO2 118.2*H, 

Arterial Blood Partial Pressure O2 105.9H, Arterial Blood HCO3 46.1*H, Arterial 

Blood Oxygen Saturation 97.6, Arterial Blood Base Excess 12.1*H, Frandy Test 

Positive


1/24/20 05:00: 


White Blood Count 8.6, Red Blood Count 5.57, Hemoglobin 14.7, Hematocrit 47.2, 

Mean Corpuscular Volume 85, Mean Corpuscular Hemoglobin 26.4L, Mean Corpuscular 

Hemoglobin Concent 31.3L, Red Cell Distribution Width 17.2H, Platelet Count 129L

, Mean Platelet Volume 7.3, Neutrophils (%) (Auto) , Lymphocytes (%) (Auto) , 

Monocytes (%) (Auto) , Eosinophils (%) (Auto) , Basophils (%) (Auto) , Sodium 

Level 137, Potassium Level 4.5, Chloride Level 96L, Carbon Dioxide Level 37H, 

Anion Gap 4L, Blood Urea Nitrogen 15, Creatinine 0.7, Estimat Glomerular 

Filtration Rate > 60, Glucose Level 235#H, Lactic Acid Level 1.30, Uric Acid 6.3

, Calcium Level 8.7, Phosphorus Level 1.0L, Magnesium Level 1.2L, Total 

Bilirubin 1.8H, Direct Bilirubin 0.8H, Gamma Glutamyl Transpeptidase 14, 

Aspartate Amino Transf (AST/SGOT) 99H, Alanine Aminotransferase (ALT/SGPT) 592H

, Alkaline Phosphatase 84, Ammonia 54H, Troponin I 0.482H, C-Reactive Protein, 

Quantitative 1.3H, Pro-B-Type Natriuretic Peptide [Pending], Total Protein 5.0L

, Albumin 2.3L, Globulin 2.7, Albumin/Globulin Ratio 0.9L


1/24/20 08:30: 


Arterial Blood pH 7.563*H, Arterial Blood Partial Pressure CO2 44.1, Arterial 

Blood Partial Pressure O2 50.9L, Arterial Blood HCO3 38.9H, Arterial Blood 

Oxygen Saturation 91.3L, Arterial Blood Base Excess 14.9*H, Frandy Test Positive


Height (Feet):  6


Height (Inches):  3.00


Weight (Pounds):  235


General Appearance:  no apparent distress, other - on fentanyl


Cardiovascular:  normal rate


Respiratory/Chest:  decreased breath sounds


Abdomen:  distended











Lawson Vergara MD Jan 24, 2020 09:52

## 2020-01-24 NOTE — NUR
NURSE NOTES:

Endorsement received from MILADIS Gasca. Patient on Fentanyl drip 180 mcg/hr, with RASS goal of 
-2. Patient awake and calm at this time. Answers questions by nodding and shaking head. Will 
titrate Fentanyl drip as per protocol. Orally intubated with ET 8.0, 29 lipline. AC 28, 600, 
PEEP 5, 60%. OGT present. Placement rechecked per auscultation. With left upper arm g 20, 
right AC 20. With bilateral soft wrists restraints. Skin warm and dry, intact skin. Hernandez 
catheter present. SCDs in place.  Head of bed elevated. Bed locked and in low position. Bed 
alarm on. Call light within reach.

## 2020-01-24 NOTE — NUR
NURSE NOTES:

No shortness of breath. 100% saturation on the monitor.Secretions suctioned. Noted large 
amount of thick white secretion from the mouth. With small amount of blood coming out from 
the nose.

## 2020-01-24 NOTE — NUR
NURSE NOTES:

Bed bath, oral care done. Patient restless, attempting to get up from the bed and pull out 
ETT. PRN ativan given, Fentanyl drip increased to 400mcg/hr.

## 2020-01-24 NOTE — NUR
*-* INSURANCE *-*



ALL AVAILABLE CLINICALS AND REVIEWS  HAVE BEEN FAXED TO:



[facility]

[place]

P: [phone]

F: [phone]

REF# [id number]

## 2020-01-24 NOTE — NUR
RD ASSESSMENT & RECOMMENDATIONS

SEE CARE ACTIVITY FOR COMPLETE ASSESSMENT



DAILY ESTIMATED NEEDS:

Needs based on obesity, DM, critical care, obesity 91.8kg  

11-14kcal/kg actual body wt (106.6kg)  kcals/kg 

5725-7771  total kcals

1.2-2g/kg abw  g protein/kg

110-184  g total protein 

25-30ml/kg abw  mL/kg

8050-0732  total fluid mLs



NUTRITION DIAGNOSIS:

* Swallowing difficulty R/T respiratory status as evidenced by pt orally

intubated and sedated, w/ OGT in place, NPO at this time.

* Altered nutrition related lab values R/T diabetes, liver dysfunction,

clinical status as evidenced by hypoglycemia and hyperglycemia (POC glu

251 [phone] 339), low mg (1.2), low Phos (1.0), elev T bili and LFTs.





CURRENT DIET:NPO    

 



PO DIET RECOMMENDATIONS:

SLP eval post extubation -> CARDIAC, CCHO MED 







ENTERAL NUTRITION RECOMMENDATIONS:

Vital AF 1.2 @ 50ml/hr x 24 hrs + Prosource 1pkt BID  to provide 1200ml, 1440kcal, 90g + 22 
prot, 973ml free water 



* When medically appropriate, initiate Vital AF 1.2 @ 20ml/hr x 6 hrs

* Advance 10ml q 4-6 hrs as tolerated to goal rate

* HOB over 30 degrees/ water flush per MD.

* Add Prosource 1pkt BID to meet protein needs



 



ADDITIONAL RECOMMENDATIONS:

1) Calibrated bedscale wt 

2) Monitor for hypoglycemia while pt is NPO 

3) Monitor lytes, replete as needed (low phos and mag) 

4) TF rec as above if medically appropriate to start feeding via OGT

## 2020-01-25 VITALS — DIASTOLIC BLOOD PRESSURE: 98 MMHG | SYSTOLIC BLOOD PRESSURE: 136 MMHG

## 2020-01-25 VITALS — SYSTOLIC BLOOD PRESSURE: 128 MMHG | DIASTOLIC BLOOD PRESSURE: 92 MMHG

## 2020-01-25 VITALS — DIASTOLIC BLOOD PRESSURE: 102 MMHG | SYSTOLIC BLOOD PRESSURE: 140 MMHG

## 2020-01-25 VITALS — SYSTOLIC BLOOD PRESSURE: 120 MMHG | DIASTOLIC BLOOD PRESSURE: 95 MMHG

## 2020-01-25 VITALS — SYSTOLIC BLOOD PRESSURE: 136 MMHG | DIASTOLIC BLOOD PRESSURE: 97 MMHG

## 2020-01-25 VITALS — SYSTOLIC BLOOD PRESSURE: 122 MMHG | DIASTOLIC BLOOD PRESSURE: 105 MMHG

## 2020-01-25 VITALS — SYSTOLIC BLOOD PRESSURE: 109 MMHG | DIASTOLIC BLOOD PRESSURE: 77 MMHG

## 2020-01-25 VITALS — DIASTOLIC BLOOD PRESSURE: 107 MMHG | SYSTOLIC BLOOD PRESSURE: 135 MMHG

## 2020-01-25 VITALS — DIASTOLIC BLOOD PRESSURE: 95 MMHG | SYSTOLIC BLOOD PRESSURE: 132 MMHG

## 2020-01-25 VITALS — SYSTOLIC BLOOD PRESSURE: 149 MMHG | DIASTOLIC BLOOD PRESSURE: 102 MMHG

## 2020-01-25 VITALS — SYSTOLIC BLOOD PRESSURE: 130 MMHG | DIASTOLIC BLOOD PRESSURE: 99 MMHG

## 2020-01-25 VITALS — SYSTOLIC BLOOD PRESSURE: 98 MMHG | DIASTOLIC BLOOD PRESSURE: 71 MMHG

## 2020-01-25 VITALS — DIASTOLIC BLOOD PRESSURE: 112 MMHG | SYSTOLIC BLOOD PRESSURE: 145 MMHG

## 2020-01-25 VITALS — DIASTOLIC BLOOD PRESSURE: 98 MMHG | SYSTOLIC BLOOD PRESSURE: 147 MMHG

## 2020-01-25 VITALS — SYSTOLIC BLOOD PRESSURE: 149 MMHG | DIASTOLIC BLOOD PRESSURE: 105 MMHG

## 2020-01-25 VITALS — SYSTOLIC BLOOD PRESSURE: 120 MMHG | DIASTOLIC BLOOD PRESSURE: 87 MMHG

## 2020-01-25 VITALS — DIASTOLIC BLOOD PRESSURE: 89 MMHG | SYSTOLIC BLOOD PRESSURE: 130 MMHG

## 2020-01-25 VITALS — SYSTOLIC BLOOD PRESSURE: 147 MMHG | DIASTOLIC BLOOD PRESSURE: 103 MMHG

## 2020-01-25 VITALS — DIASTOLIC BLOOD PRESSURE: 80 MMHG | SYSTOLIC BLOOD PRESSURE: 111 MMHG

## 2020-01-25 VITALS — SYSTOLIC BLOOD PRESSURE: 114 MMHG | DIASTOLIC BLOOD PRESSURE: 94 MMHG

## 2020-01-25 VITALS — SYSTOLIC BLOOD PRESSURE: 142 MMHG | DIASTOLIC BLOOD PRESSURE: 104 MMHG

## 2020-01-25 VITALS — SYSTOLIC BLOOD PRESSURE: 125 MMHG | DIASTOLIC BLOOD PRESSURE: 91 MMHG

## 2020-01-25 VITALS — DIASTOLIC BLOOD PRESSURE: 95 MMHG | SYSTOLIC BLOOD PRESSURE: 149 MMHG

## 2020-01-25 VITALS — DIASTOLIC BLOOD PRESSURE: 90 MMHG | SYSTOLIC BLOOD PRESSURE: 128 MMHG

## 2020-01-25 VITALS — SYSTOLIC BLOOD PRESSURE: 109 MMHG | DIASTOLIC BLOOD PRESSURE: 68 MMHG

## 2020-01-25 VITALS — SYSTOLIC BLOOD PRESSURE: 114 MMHG | DIASTOLIC BLOOD PRESSURE: 85 MMHG

## 2020-01-25 VITALS — DIASTOLIC BLOOD PRESSURE: 77 MMHG | SYSTOLIC BLOOD PRESSURE: 110 MMHG

## 2020-01-25 VITALS — DIASTOLIC BLOOD PRESSURE: 93 MMHG | SYSTOLIC BLOOD PRESSURE: 127 MMHG

## 2020-01-25 VITALS — DIASTOLIC BLOOD PRESSURE: 76 MMHG | SYSTOLIC BLOOD PRESSURE: 114 MMHG

## 2020-01-25 VITALS — DIASTOLIC BLOOD PRESSURE: 91 MMHG | SYSTOLIC BLOOD PRESSURE: 121 MMHG

## 2020-01-25 VITALS — DIASTOLIC BLOOD PRESSURE: 96 MMHG | SYSTOLIC BLOOD PRESSURE: 145 MMHG

## 2020-01-25 VITALS — SYSTOLIC BLOOD PRESSURE: 154 MMHG | DIASTOLIC BLOOD PRESSURE: 103 MMHG

## 2020-01-25 VITALS — SYSTOLIC BLOOD PRESSURE: 141 MMHG | DIASTOLIC BLOOD PRESSURE: 103 MMHG

## 2020-01-25 VITALS — DIASTOLIC BLOOD PRESSURE: 93 MMHG | SYSTOLIC BLOOD PRESSURE: 129 MMHG

## 2020-01-25 VITALS — SYSTOLIC BLOOD PRESSURE: 117 MMHG | DIASTOLIC BLOOD PRESSURE: 80 MMHG

## 2020-01-25 VITALS — DIASTOLIC BLOOD PRESSURE: 98 MMHG | SYSTOLIC BLOOD PRESSURE: 128 MMHG

## 2020-01-25 VITALS — DIASTOLIC BLOOD PRESSURE: 89 MMHG | SYSTOLIC BLOOD PRESSURE: 120 MMHG

## 2020-01-25 VITALS — SYSTOLIC BLOOD PRESSURE: 136 MMHG | DIASTOLIC BLOOD PRESSURE: 106 MMHG

## 2020-01-25 VITALS — DIASTOLIC BLOOD PRESSURE: 87 MMHG | SYSTOLIC BLOOD PRESSURE: 127 MMHG

## 2020-01-25 VITALS — DIASTOLIC BLOOD PRESSURE: 81 MMHG | SYSTOLIC BLOOD PRESSURE: 134 MMHG

## 2020-01-25 VITALS — SYSTOLIC BLOOD PRESSURE: 108 MMHG | DIASTOLIC BLOOD PRESSURE: 76 MMHG

## 2020-01-25 VITALS — SYSTOLIC BLOOD PRESSURE: 128 MMHG | DIASTOLIC BLOOD PRESSURE: 94 MMHG

## 2020-01-25 VITALS — SYSTOLIC BLOOD PRESSURE: 147 MMHG | DIASTOLIC BLOOD PRESSURE: 104 MMHG

## 2020-01-25 VITALS — SYSTOLIC BLOOD PRESSURE: 115 MMHG | DIASTOLIC BLOOD PRESSURE: 83 MMHG

## 2020-01-25 VITALS — SYSTOLIC BLOOD PRESSURE: 135 MMHG | DIASTOLIC BLOOD PRESSURE: 99 MMHG

## 2020-01-25 VITALS — DIASTOLIC BLOOD PRESSURE: 91 MMHG | SYSTOLIC BLOOD PRESSURE: 115 MMHG

## 2020-01-25 VITALS — DIASTOLIC BLOOD PRESSURE: 97 MMHG | SYSTOLIC BLOOD PRESSURE: 136 MMHG

## 2020-01-25 VITALS — DIASTOLIC BLOOD PRESSURE: 81 MMHG | SYSTOLIC BLOOD PRESSURE: 113 MMHG

## 2020-01-25 VITALS — SYSTOLIC BLOOD PRESSURE: 120 MMHG | DIASTOLIC BLOOD PRESSURE: 85 MMHG

## 2020-01-25 VITALS — SYSTOLIC BLOOD PRESSURE: 123 MMHG | DIASTOLIC BLOOD PRESSURE: 87 MMHG

## 2020-01-25 VITALS — DIASTOLIC BLOOD PRESSURE: 103 MMHG | SYSTOLIC BLOOD PRESSURE: 146 MMHG

## 2020-01-25 VITALS — SYSTOLIC BLOOD PRESSURE: 134 MMHG | DIASTOLIC BLOOD PRESSURE: 95 MMHG

## 2020-01-25 VITALS — SYSTOLIC BLOOD PRESSURE: 118 MMHG | DIASTOLIC BLOOD PRESSURE: 101 MMHG

## 2020-01-25 VITALS — DIASTOLIC BLOOD PRESSURE: 93 MMHG | SYSTOLIC BLOOD PRESSURE: 150 MMHG

## 2020-01-25 VITALS — DIASTOLIC BLOOD PRESSURE: 81 MMHG | SYSTOLIC BLOOD PRESSURE: 111 MMHG

## 2020-01-25 VITALS — SYSTOLIC BLOOD PRESSURE: 131 MMHG | DIASTOLIC BLOOD PRESSURE: 95 MMHG

## 2020-01-25 VITALS — DIASTOLIC BLOOD PRESSURE: 92 MMHG | SYSTOLIC BLOOD PRESSURE: 130 MMHG

## 2020-01-25 VITALS — SYSTOLIC BLOOD PRESSURE: 130 MMHG | DIASTOLIC BLOOD PRESSURE: 92 MMHG

## 2020-01-25 VITALS — SYSTOLIC BLOOD PRESSURE: 133 MMHG | DIASTOLIC BLOOD PRESSURE: 89 MMHG

## 2020-01-25 VITALS — SYSTOLIC BLOOD PRESSURE: 116 MMHG | DIASTOLIC BLOOD PRESSURE: 85 MMHG

## 2020-01-25 VITALS — SYSTOLIC BLOOD PRESSURE: 127 MMHG | DIASTOLIC BLOOD PRESSURE: 93 MMHG

## 2020-01-25 VITALS — SYSTOLIC BLOOD PRESSURE: 127 MMHG | DIASTOLIC BLOOD PRESSURE: 80 MMHG

## 2020-01-25 VITALS — DIASTOLIC BLOOD PRESSURE: 91 MMHG | SYSTOLIC BLOOD PRESSURE: 120 MMHG

## 2020-01-25 VITALS — SYSTOLIC BLOOD PRESSURE: 149 MMHG | DIASTOLIC BLOOD PRESSURE: 103 MMHG

## 2020-01-25 VITALS — DIASTOLIC BLOOD PRESSURE: 102 MMHG | SYSTOLIC BLOOD PRESSURE: 132 MMHG

## 2020-01-25 VITALS — DIASTOLIC BLOOD PRESSURE: 107 MMHG | SYSTOLIC BLOOD PRESSURE: 149 MMHG

## 2020-01-25 VITALS — SYSTOLIC BLOOD PRESSURE: 123 MMHG | DIASTOLIC BLOOD PRESSURE: 91 MMHG

## 2020-01-25 VITALS — DIASTOLIC BLOOD PRESSURE: 81 MMHG | SYSTOLIC BLOOD PRESSURE: 101 MMHG

## 2020-01-25 VITALS — SYSTOLIC BLOOD PRESSURE: 123 MMHG | DIASTOLIC BLOOD PRESSURE: 86 MMHG

## 2020-01-25 VITALS — DIASTOLIC BLOOD PRESSURE: 85 MMHG | SYSTOLIC BLOOD PRESSURE: 122 MMHG

## 2020-01-25 LAB
ADD MANUAL DIFF: YES
ALBUMIN SERPL-MCNC: 2.5 G/DL (ref 3.4–5)
ALP SERPL-CCNC: 90 U/L (ref 46–116)
ALT SERPL-CCNC: 521 U/L (ref 12–78)
ANION GAP SERPL CALC-SCNC: 5 MMOL/L (ref 5–15)
AST SERPL-CCNC: 59 U/L (ref 15–37)
BILIRUB DIRECT SERPL-MCNC: 0.6 MG/DL (ref 0–0.3)
BILIRUB SERPL-MCNC: 1.5 MG/DL (ref 0.2–1)
BUN SERPL-MCNC: 22 MG/DL (ref 7–18)
CALCIUM SERPL-MCNC: 8.9 MG/DL (ref 8.5–10.1)
CHLORIDE SERPL-SCNC: 101 MMOL/L (ref 98–107)
CO2 SERPL-SCNC: 35 MMOL/L (ref 21–32)
CREAT SERPL-MCNC: 0.8 MG/DL (ref 0.55–1.3)
ERYTHROCYTE [DISTWIDTH] IN BLOOD BY AUTOMATED COUNT: 17.8 % (ref 11.6–14.8)
HCT VFR BLD CALC: 51.9 % (ref 42–52)
HGB BLD-MCNC: 16.3 G/DL (ref 14.2–18)
MCV RBC AUTO: 83 FL (ref 80–99)
PHOSPHATE SERPL-MCNC: 3.2 MG/DL (ref 2.5–4.9)
PLATELET # BLD: 155 K/UL (ref 150–450)
POTASSIUM SERPL-SCNC: 3.4 MMOL/L (ref 3.5–5.1)
RBC # BLD AUTO: 6.23 M/UL (ref 4.7–6.1)
SODIUM SERPL-SCNC: 141 MMOL/L (ref 136–145)
WBC # BLD AUTO: 9.7 K/UL (ref 4.8–10.8)

## 2020-01-25 RX ADMIN — IPRATROPIUM BROMIDE AND ALBUTEROL SULFATE PRN ML: .5; 3 SOLUTION RESPIRATORY (INHALATION) at 10:29

## 2020-01-25 RX ADMIN — PANTOPRAZOLE SODIUM SCH MG: 40 INJECTION, POWDER, FOR SOLUTION INTRAVENOUS at 21:05

## 2020-01-25 RX ADMIN — PHENYLEPHRINE HYDROCHLORIDE SCH MLS/HR: 10 INJECTION, SOLUTION INTRAMUSCULAR; INTRAVENOUS; SUBCUTANEOUS at 22:41

## 2020-01-25 RX ADMIN — SODIUM CHLORIDE SCH MLS/HR: 0.9 INJECTION INTRAVENOUS at 16:23

## 2020-01-25 RX ADMIN — TRAZODONE HYDROCHLORIDE SCH MG: 100 TABLET ORAL at 21:05

## 2020-01-25 RX ADMIN — PANTOPRAZOLE SODIUM SCH MG: 40 INJECTION, POWDER, FOR SOLUTION INTRAVENOUS at 08:50

## 2020-01-25 RX ADMIN — INSULIN ASPART SCH UNITS: 100 INJECTION, SOLUTION INTRAVENOUS; SUBCUTANEOUS at 05:24

## 2020-01-25 RX ADMIN — ASPIRIN 81 MG SCH MG: 81 TABLET ORAL at 08:59

## 2020-01-25 RX ADMIN — METHYLPREDNISOLONE SODIUM SUCCINATE SCH MG: 40 INJECTION, POWDER, LYOPHILIZED, FOR SOLUTION INTRAMUSCULAR; INTRAVENOUS at 13:24

## 2020-01-25 RX ADMIN — INSULIN ASPART SCH UNITS: 100 INJECTION, SOLUTION INTRAVENOUS; SUBCUTANEOUS at 11:31

## 2020-01-25 RX ADMIN — VASOPRESSIN SCH MLS/HR: 20 INJECTION, SOLUTION INTRAMUSCULAR; SUBCUTANEOUS at 01:10

## 2020-01-25 RX ADMIN — INSULIN ASPART SCH UNITS: 100 INJECTION, SOLUTION INTRAVENOUS; SUBCUTANEOUS at 16:25

## 2020-01-25 RX ADMIN — VASOPRESSIN SCH MLS/HR: 20 INJECTION, SOLUTION INTRAMUSCULAR; SUBCUTANEOUS at 05:16

## 2020-01-25 RX ADMIN — INSULIN ASPART SCH UNITS: 100 INJECTION, SOLUTION INTRAVENOUS; SUBCUTANEOUS at 21:07

## 2020-01-25 RX ADMIN — METHYLPREDNISOLONE SODIUM SUCCINATE SCH MG: 40 INJECTION, POWDER, LYOPHILIZED, FOR SOLUTION INTRAMUSCULAR; INTRAVENOUS at 22:40

## 2020-01-25 RX ADMIN — NITROGLYCERIN SCH PATCH: 0.4 PATCH TRANSDERMAL at 13:25

## 2020-01-25 RX ADMIN — METHYLPREDNISOLONE SODIUM SUCCINATE SCH MG: 40 INJECTION, POWDER, LYOPHILIZED, FOR SOLUTION INTRAMUSCULAR; INTRAVENOUS at 05:15

## 2020-01-25 RX ADMIN — INSULIN DETEMIR SCH UNITS: 100 INJECTION, SOLUTION SUBCUTANEOUS at 08:52

## 2020-01-25 RX ADMIN — PHENYLEPHRINE HYDROCHLORIDE SCH MLS/HR: 10 INJECTION, SOLUTION INTRAMUSCULAR; INTRAVENOUS; SUBCUTANEOUS at 13:20

## 2020-01-25 RX ADMIN — VASOPRESSIN SCH MLS/HR: 20 INJECTION, SOLUTION INTRAMUSCULAR; SUBCUTANEOUS at 08:51

## 2020-01-25 NOTE — PROGRESS NOTE
DATE:  01/24/2020

CRITICAL CARE NOTE



SUBJECTIVE:  The patient is a 49-year-old male.  The patient is intubated,

currently still critical in the ICU.  Care discussed with nursing staff.

The patient is on still high FiO2 at 30%, AC 28.



PHYSICAL EXAMINATION:

VITAL SIGNS:  Reviewed in detail, blood pressure 120/65, respiratory rate

27, pulse 59, temperature 98.5, sats are 95%.

HEENT:  Negative.  The patient is orally intubated.

LUNGS:  Coarse breath sounds.  Moderate air entry.

CARDIAC:  S1 and S2.  Regular rate and rhythm without murmurs, rubs, or

gallops.

ABDOMEN:  Soft.  Feeding tube in place.

EXTREMITIES:  No cyanosis, clubbing, mild edema.

NEUROLOGIC:  The patient is sedated.



LABORATORY DATA:  Otherwise all reviewed, see attached note.  Chemistries

noted, see attached note.  Blood gases reviewed, 7.56/44/50.



IMPRESSION:

1. Respiratory failure, acute.

2. Chronic obstructive pulmonary disease with exacerbation.

3. Hyponatremia.

4. Hypochloremia

5. Transaminitis.

6. Congestive heart failure.

7. Hypoxemia.

8. Severe protein-calorie malnutrition.



RECOMMENDATIONS:

1. Supportive care.

2. Ventilatory support.

3. Taper FiO2 as able.

4. Monitor clinically for further changes and interventions.

5. The patient remains acutely ill and requires ICU care and management.

 

6. We will follow clinically for changes.

7. supportive for now.

8. DVT prophylaxis.

9. Monitor imaging.

10. Monitor x-ray and assess clinically.









  ______________________________________________

  Marcos Jama M.D.





DR:  Whitney

D:  01/24/2020 18:01

T:  01/25/2020 01:51

JOB#:  [id number]

CC:



ROLAND

## 2020-01-25 NOTE — NUR
NURSE NOTES:

Received patient from Yojana REDDING, patient is sedated RASS score -2. Sinus Rhythm on the Heart 
Monitor, HR 89. Receiving oxygen via ET-tube size 8.0m, 29cm at the lip line, vent settings: 
 AC 28, , FiO2 100%, PEEP 5. IV site is right AC 20g receiving Fentanyl at 240mcg/hr, 
Left Upper Arm 20g patent and intact. Hernandez catheter is intact and draining. Bed is locked, 
placed in lowest position, side rails up x3, bed alarm on, head of bed elevated, call light 
within reach. Will continue to monitor.

-------------------------------------------------------------------------------

Addendum: 01/25/20 at 0938 by Bashir Sweet RN

-------------------------------------------------------------------------------

NURSE NOTES:

Received patient from Yojana REDDING, patient is sedated RASS score -2. Sinus Rhythm on the Heart 
Monitor, HR 89. Receiving oxygen via ET-tube size 8.0m, 29cm at the lip line, right side, 
vent settings:  AC 28, , FiO2 60%, PEEP 5. IV site is right AC 20g receiving Fentanyl 
at 240mcg/hr, Left Upper Arm 20g patent and intact. Hernandez catheter is intact and draining. 
Bed is locked, placed in lowest position, side rails up x3, bed alarm on, head of bed 
elevated, call light within reach. Will continue to monitor.

## 2020-01-25 NOTE — NUR
RESPIRATORY NOTE:

received pt orally intubated with ETT 8.0, placed 29cm at the lip. ETT is secured via anchor 
fast with no visible redness around facial area. pt on current vent settings and in no 
apparent resp distress. etCO2 attached with alarms set appropriately. vent alarms also set 
and audible with ambu bag at bedside. will cont to monitor.

## 2020-01-25 NOTE — DIAGNOSTIC IMAGING REPORT
EXAM:

  XR Chest, 1 View

 

CLINICAL HISTORY:

  TUBE PLCMT

 

TECHNIQUE:

  Frontal view of the chest.

 

COMPARISON:

  Chest x-ray dated 1/23/2020

 

FINDINGS:

  Lungs:  Diffuse airspace opacities which may represent mild pulmonary 

vascular congestion versus an infectious process.

  Pleural space:  Unremarkable.  No pneumothorax.

  Heart:  Heart is enlarged.

  Mediastinum:  Unremarkable.

  Bones/joints:  Unremarkable.

  Tubes, lines and devices:  Endotracheal tube terminates 4 cm above the 

paige.

 

IMPRESSION:     

1.  Endotracheal tube terminates 4 cm above the paige.

2.  Diffuse airspace opacities which may represent mild pulmonary 

vascular congestion versus an infectious process.

3.  Heart is enlarged.

## 2020-01-25 NOTE — PULMONOLOGY PROGRESS NOTE
Assessment/Plan


Assessment/Plan


IMPRESSION:


1. Hypoglycemia. Seen by endocrine


2. Metabolic encephalopathy.


3. Chronic obstructive pulmonary disease with exacerbation. Now intubated 


4. Acute myocardial infarction.


5. Hyponatremia.


6. Hypochloremia.


7. Hypomagnesemia.


8. Transaminitis.


9. Acute on chronic congestive heart failure, possibly systolic and


diastolic.





PLAN:


1. Continue vent


2. Serial troponin.


3. Anti-platelet therapy.


4. On Fentanyl


5. Current present care


6. DVT prophylaxis.


7. Empiric antimicrobials.


8. Echocardiogram.


9. Metabolic profile.


10. Abdominal ultrasound.





GI, endocrine and cardiology consults appreciated














  ______________________________________________


True duffy M.D.





Subjective


Interval Events:  Awake; on Fentanyl gtt in ICU; On vent; currently 100% FiO2


Constitutional:  Reports: no symptoms


HEENT:  Repors: no symptoms


Respiratory:  Reports: no symptoms


Cardiovascular:  Reports: no symptoms


Gastrointestinal/Abdominal:  Reports: no symptoms


Genitourinary:  Reports: no symptoms


Allergies:  


Coded Allergies:  


     No Known Allergies (Unverified , 1/16/18)





Objective





Last 24 Hour Vital Signs








  Date Time  Temp Pulse Resp B/P (MAP) Pulse Ox O2 Delivery O2 Flow Rate FiO2


 


1/25/20 15:30   27   Endotracheal Tube  50


 


1/25/20 15:27  76 28     50


 


1/25/20 15:00   20   Endotracheal Tube  50


 


1/25/20 14:45  85 22 127/93 (104) 97   


 


1/25/20 14:30  95 19 118/101 (107) 94   


 


1/25/20 14:15  69 25 120/87 (98) 92   


 


1/25/20 14:00   27   Endotracheal Tube  60


 


1/25/20 14:00  73 27 120/89 (99) 94   


 


1/25/20 13:45  75 27 122/85 (97) 95   


 


1/25/20 13:30  75 27 114/94 (101) 98   


 


1/25/20 13:25    122/91    


 


1/25/20 13:20   28   Mechanical Ventilator 12.0 50


 


1/25/20 13:15  87 17 125/91 (102) 92   


 


1/25/20 13:10  109 22 135/107 (116) 97   


 


1/25/20 13:03  81 28     50


 


1/25/20 13:00  100 23 135/107 (116) 97   


 


1/25/20 13:00   21   Endotracheal Tube  60


 


1/25/20 12:45  74 27 120/85 (97) 96   


 


1/25/20 12:30  73 25 123/87 (99) 96   


 


1/25/20 12:15  93 23 121/91 (101) 99   


 


1/25/20 12:00      Mechanical Ventilator  


 


1/25/20 12:00 98.1 83 10 120/91 (101) 91   


 


1/25/20 12:00        60


 


1/25/20 12:00   18   Endotracheal Tube  60


 


1/25/20 12:00        60


 


1/25/20 11:46  84      


 


1/25/20 11:45  100 24 127/93 (104) 95   


 


1/25/20 11:40    149/107    


 


1/25/20 11:30  97 19 149/107 (121) 97   


 


1/25/20 11:00   17   Endotracheal Tube  40


 


1/25/20 11:00  80 17 130/92 (105) 96   


 


1/25/20 10:45  85 22 130/92 (105) 95   


 


1/25/20 10:33  88 28     40


 


1/25/20 10:30  94 21 135/99 (111) 100   


 


1/25/20 10:29  99 28  100 Mechanical Ventilator  50





  98 28  100   


 


1/25/20 10:15  108 17 145/112 (123) 95   


 


1/25/20 10:00  98 21 142/104 (117) 98   


 


1/25/20 10:00   21   Endotracheal Tube  60


 


1/25/20 09:45  96 20 122/105 (111) 96   


 


1/25/20 09:30  73 27 128/92 (104) 96   


 


1/25/20 09:15  75 21 128/94 (105) 95   


 


1/25/20 09:00  80 26 136/98 (111) 97   


 


1/25/20 09:00   28   Endotracheal Tube  60


 


1/25/20 08:51   28   Endotracheal Tube 12.0 60


 


1/25/20 08:50   15   Endotracheal Tube  60


 


1/25/20 08:45  87 24 132/102 (112) 98   


 


1/25/20 08:43  71 28     50


 


1/25/20 08:30  81 26 130/89 (103) 98   


 


1/25/20 08:15  89 23 133/89 (104) 100   


 


1/25/20 08:00      Mechanical Ventilator  


 


1/25/20 08:00        60


 


1/25/20 08:00   27   Endotracheal Tube  60


 


1/25/20 08:00 98.6 77 27 134/81 (98) 99   


 


1/25/20 07:53  88      


 


1/25/20 07:30  67 26 134/95 (108) 100   


 


1/25/20 07:28  67 28     60


 


1/25/20 07:00   18   Endotracheal Tube  60


 


1/25/20 07:00  69 12 136/97 (110) 97   


 


1/25/20 06:00  70 19 136/97 (110) 98   


 


1/25/20 06:00      Mechanical Ventilator  60


 


1/25/20 05:48 97.9       


 


1/25/20 05:30  68 26 129/93 (105) 97   


 


1/25/20 05:16      Mechanical Ventilator  60


 


1/25/20 05:04  71 28     60


 


1/25/20 05:00   28   Mechanical Ventilator  60


 


1/25/20 05:00  70 27 127/87 (100) 98   


 


1/25/20 04:30  70 20 145/96 (112) 100   


 


1/25/20 04:00   28   Mechanical Ventilator  60


 


1/25/20 04:00      Mechanical Ventilator  


 


1/25/20 04:00        60


 


1/25/20 04:00 97.9 72 26 140/102 (115) 96   


 


1/25/20 04:00  73      


 


1/25/20 03:30  72 21 141/103 (116) 98   


 


1/25/20 03:15  71 26 147/103 (118) 99   


 


1/25/20 03:11  74 28     60


 


1/25/20 03:00  72 23 147/104 (118) 97   


 


1/25/20 03:00      Mechanical Ventilator  60


 


1/25/20 02:45  70 25 149/105 (120) 98   


 


1/25/20 02:30  68 28 149/103 (118) 97   


 


1/25/20 02:00   28   Mechanical Ventilator  60


 


1/25/20 02:00  65 26 147/98 (114) 97   


 


1/25/20 01:30  62 25 149/102 (118) 97   


 


1/25/20 01:10   28   Mechanical Ventilator  60


 


1/25/20 01:00  62 26 154/103 (120) 96   


 


1/25/20 01:00   28   Mechanical Ventilator  60


 


1/25/20 00:39  60 28     60


 


1/25/20 00:30  60 16 149/95 (113) 96   


 


1/25/20 00:00        60


 


1/25/20 00:00  58      


 


1/25/20 00:00 97.6 65 21 150/93 (112) 96   


 


1/25/20 00:00      Mechanical Ventilator  


 


1/25/20 00:00   28   Mechanical Ventilator  60


 


1/24/20 23:45  57 16 150/98 (115) 96   


 


1/24/20 23:30  59 16 149/101 (117) 97   


 


1/24/20 23:15  56 15 152/92 (112) 97   


 


1/24/20 23:12  56 28     60


 


1/24/20 23:06  62 14 149/96 (113) 98   


 


1/24/20 23:00   28   Mechanical Ventilator  60


 


1/24/20 23:00  56 12 171/86 (114) 96   


 


1/24/20 22:45  57 17 164/87 (112) 96   


 


1/24/20 22:30  57 13 168/82 (110) 96   


 


1/24/20 22:15  62 16 170/82 (111) 96   


 


1/24/20 22:00  58 13 161/79 (106) 95   


 


1/24/20 22:00   22   Mechanical Ventilator  60


 


1/24/20 21:45  59 17 164/85 (111) 95   


 


1/24/20 21:30  60 27 163/93 (116) 98   


 


1/24/20 21:29  60 28     60


 


1/24/20 21:15  59 26 167/92 (117) 96   


 


1/24/20 21:00  58 12 167/85 (112) 96   


 


1/24/20 21:00   23   Mechanical Ventilator  60


 


1/24/20 20:48  60 14 164/83 (110) 97   


 


1/24/20 20:45  60 21 170/87 (114) 97   


 


1/24/20 20:30  61 22 167/84 (111) 98   


 


1/24/20 20:28   28   Mechanical Ventilator  60


 


1/24/20 20:15  60 20 158/90 (112) 97   


 


1/24/20 20:00      Mechanical Ventilator  


 


1/24/20 20:00 97.6 57 18 161/80 (107) 97   


 


1/24/20 20:00   20   Mechanical Ventilator  60


 


1/24/20 20:00  59      


 


1/24/20 20:00        60


 


1/24/20 19:45  60 23 154/85 (108) 97   


 


1/24/20 19:30  59 24 145/74 (97) 97   


 


1/24/20 19:02  58 28     60


 


1/24/20 19:00  59 16 133/79 (97) 96   


 


1/24/20 19:00   15   Endotracheal Tube  100


 


1/24/20 18:00   19   Endotracheal Tube  100


 


1/24/20 18:00  65 19 133/89 (104) 97   


 


1/24/20 17:30  58 14 122/72 (89) 95   


 


1/24/20 17:00   24   Endotracheal Tube  100


 


1/24/20 17:00  59 24 120/65 (83) 95   


 


1/24/20 16:56  63 28     60

















Intake and Output  


 


 1/24/20 1/25/20





 19:00 07:00


 


Intake Total 704.75 ml 284 ml


 


Output Total 790 ml 410 ml


 


Balance -85.25 ml -126 ml


 


  


 


Intake IV Total 704.75 ml 284 ml


 


Output Urine Total 790 ml 410 ml








General Appearance:  no acute distress


HEENT:  normocephalic


Respiratory/Chest:  chest wall non-tender, decreased breath sounds


Cardiovascular:  normal peripheral pulses, normal rate


Abdomen:  normal bowel sounds


Extremities:  no cyanosis


Laboratory Tests


1/25/20 06:00: 


Hemoglobin A1c 7.2H, Total Bilirubin 1.5H, Direct Bilirubin 0.6H, Aspartate 

Amino Transf (AST/SGOT) 59H, Alanine Aminotransferase (ALT/SGPT) 521H, Alkaline 

Phosphatase 90, Troponin I 1.453H, Total Protein 5.5L, Albumin 2.5L


1/25/20 09:07: 


White Blood Count 9.7, Red Blood Count 6.23H, Hemoglobin 16.3, Hematocrit 51.9, 

Mean Corpuscular Volume 83, Mean Corpuscular Hemoglobin 26.3L, Mean Corpuscular 

Hemoglobin Concent 31.5L, Red Cell Distribution Width 17.8H, Platelet Count 155

, Mean Platelet Volume 6.8, Neutrophils (%) (Auto) , Lymphocytes (%) (Auto) , 

Monocytes (%) (Auto) , Eosinophils (%) (Auto) , Basophils (%) (Auto) , 

Differential Total Cells Counted 100, Neutrophils % (Manual) 90H, Lymphocytes % 

(Manual) 8L, Monocytes % (Manual) 2, Eosinophils % (Manual) 0, Basophils % (

Manual) 0, Band Neutrophils 0, Platelet Estimate Adequate, Platelet Morphology 

Normal, Anisocytosis 1+, Sodium Level 141, Potassium Level 3.4L, Chloride Level 

101, Carbon Dioxide Level 35H, Anion Gap 5, Blood Urea Nitrogen 22H, Creatinine 

0.8, Estimat Glomerular Filtration Rate > 60, Glucose Level 185H, Calcium Level 

8.9, Phosphorus Level 3.2, Magnesium Level 1.6L





Current Medications








 Medications


  (Trade)  Dose


 Ordered  Sig/German


 Route


 PRN Reason  Start Time


 Stop Time Status Last Admin


Dose Admin


 


 Acetaminophen


  (Tylenol)  650 mg  Q6H  PRN


 ORAL


 Mild Pain/Temp > 100.5  1/23/20 12:00


 2/21/20 17:59   


 


 


 Albuterol/


 Ipratropium


  (Albuterol/


 Ipratropium)  3 ml  Q6HRT  PRN


 HHN


 Shortness of Breath  1/23/20 13:00


 1/27/20 00:59  1/25/20 10:29


 


 


 Aspirin


  (ASA)  81 mg  DAILY


 ORAL


   1/24/20 09:00


 2/21/20 08:59   


 


 


 Ceftriaxone


 Sodium 1 gm/


 Sodium Chloride  55 ml @ 


 110 mls/hr  Q24H


 IVPB


   1/24/20 16:00


 1/30/20 15:59  1/25/20 16:23


 


 


 Dextrose


  (Dextrose 50%)  25 ml  Q30M  PRN


 IV


 Hypoglycemia  1/24/20 07:15


 2/23/20 07:14   


 


 


 Dextrose


  (Dextrose 50%)  50 ml  Q30M  PRN


 IV


 Hypoglycemia  1/24/20 07:15


 2/23/20 07:14   


 


 


 Fentanyl Citrate


 2500 mcg/Sodium


 Chloride  250 ml @ 0


 mls/hr  Q24H


 IV


   1/25/20 09:00


 2/1/20 08:59  1/25/20 13:20


 


 


 Guaifenesin/


 Dextromethorphan


  (Robitussin DM


 Syrup)  5 ml  Q6H  PRN


 ORAL


 For Cough  1/23/20 12:00


 2/21/20 17:59   


 


 


 Haloperidol


 Lactate


  (Haldol)  5 mg  Q4H  PRN


 IM


 Agitation  1/23/20 13:00


 2/22/20 12:59  1/23/20 16:49


 


 


 Insulin Aspart


  (NovoLOG)    BEFORE MEALS AND  HS


 SUBQ


   1/24/20 21:00


 2/23/20 20:59  1/25/20 16:25


 


 


 Insulin Detemir


  (Levemir)  10 units  DAILY


 SUBQ


   1/24/20 09:00


 2/23/20 08:59  1/25/20 08:52


 


 


 Isosorbide


 Dinitrate


  (Isordil)  10 mg  Q6HR


 ORAL


   1/25/20 11:00


 2/24/20 10:59  1/25/20 11:40


 


 


 Lorazepam


  (Ativan 2mg/ml


 1ml)  2 mg  Q2H  PRN


 IV


 For Anxiety  1/23/20 17:15


 1/30/20 17:14  1/24/20 03:13


 


 


 Methylprednisolone


 Sodium Succinate


  (Solu-MEDROL)  40 mg  EVERY 8  HOURS


 IVP


   1/25/20 14:00


 2/21/20 14:59  1/25/20 13:24


 


 


 Morphine Sulfate


  (Morphine


 Sulfate)  2 mg  Q2H  PRN


 IVP


 For Pain  1/23/20 17:15


 1/30/20 17:14  1/23/20 18:03


 


 


 Nitroglycerin


  (Ntg)  1 patch  Q24H


 TDERMAL


   1/23/20 13:00


 2/22/20 12:59  1/25/20 13:25


 


 


 Pantoprazole


  (Protonix)  40 mg  EVERY 12  HOURS


 IVP


   1/23/20 21:00


 2/22/20 20:59  1/25/20 08:50


 


 


 Risperidone


  (RisperDAL)  2 mg  QHS


 ORAL


   1/23/20 21:00


 2/21/20 20:59  1/24/20 20:29


 


 


 Trazodone HCl


  (Desyrel)  100 mg  BEDTIME


 ORAL


   1/23/20 21:00


 2/21/20 20:59  1/24/20 20:29


 

















True Duffy MD Jan 25, 2020 16:49

## 2020-01-25 NOTE — NUR
NURSE NOTES:

Received pt in no acute distress. Asleep, sedated but opens eyes to name. Orally intubated 
with #8ETT placed over left lip at 29cm. Noted vent settings of AC 12  fiO2.50 peep5 
saturating 100%. Secretions small amt, pinkish; chest sounds with scattered rhonchi. OGT in 
place, currently clamped, remains NPO.Afebrile, NSR; BP stable. PIV site on RAC intact, with 
Fentanyl gtt infusing at 300mcg/h to RASS-2. FC intact. Skin warm and dry. Bilat soft wrist 
restraints on as pt had history of self extubation yesterday. Will continue to monitor 
sedation status maintaining at RASS-2; monitor vital signs

## 2020-01-25 NOTE — PROGRESS NOTE
DATE:  01/24/2020

CARDIOLOGY PROGRESS NOTE



SUBJECTIVE:  The patient remains on ventilator support.  Blood pressure

parameters stable.  Monitored sinus bradycardia and sinus rhythm.



OBJECTIVE:

LUNGS:  Coarse breath sounds.  Rhonchi.

CARDIAC:  Regular rhythm and rate.  Normal S1, S2.

ABDOMEN:  Soft.

EXTREMITIES:  Trace edema.



LABORATORY DATA:  White count 8.6, hemoglobin 14.7.  Sodium 137, potassium

4.5, bicarb 37, BUN 15, creatinine 0.7, lactic acid 1.3, glucose 235,

magnesium 1.2.  Troponin down to 0.482.  Pro-natriuretic peptide 1282.



IMPRESSION:

1. Respiratory failure.

2. Acute on chronic respiratory acidosis.

3. Cirrhosis.

4. Acute myocardial infarction.

5. Hypomagnesemia.

6. Metabolic acidosis.

7. Resolved hyperkalemia.



PLAN:

1. Adjust intravenous fluids.

2. Insulin titration.

3. IV magnesium.

4. Ventilator support.

5. No diuresis at this time.

6. Continue anti-platelet therapy and nitrates.

7. No beta-blockers in view of severe obstructive lung disease and

bronchospasm.









  ______________________________________________

  Franklin Murphy M.D. DR:  TANG

D:  01/25/2020 00:24

T:  01/25/2020 00:33

JOB#:  [id number]

CC:

## 2020-01-25 NOTE — NUR
NURSE NOTES:

ETT placement noted to be at 26cm when it was reported to be at 29. CXR ordered for 
confirmation.

## 2020-01-25 NOTE — NUR
NURSE NOTES:

Patient is resting comfortably in bed, no signs of distress. Will continue to monitor.

## 2020-01-25 NOTE — NUR
NURSE NOTES:

When patient would start coughing, Cardiac Monitor shows patient going into V-tach, . 
EKG was done during episodes of "V-tach" on the cardiac monitor, EKG shows Normal Sinus 
Rhythm/Sinus Tachycardia. Results placed in patient's chart. Will continue to monitor.

## 2020-01-25 NOTE — NUR
NURSE NOTES:

Called Dr. Murphy's office and left message regarding troponin level.

-------------------------------------------------------------------------------

Addendum: 01/25/20 at 0759 by Bashir Sweet RN

-------------------------------------------------------------------------------

NURSE NOTES:

Called Dr. Murphy's office and left message to on call Dr. Yung's voicemail regarding 
troponin level.

## 2020-01-25 NOTE — NEPHROLOGY PROGRESS NOTE
Assessment/Plan


Problem List:  


(1) Electrolyte imbalance


(2) Acute respiratory failure


(3) Elevated troponin


(4) Diabetes mellitus


Plan





add nitrates


mag and K supplement


taper steroids


per orders





Subjective


ROS Limited/Unobtainable:  Yes





Objective


Objective





Last 24 Hour Vital Signs








  Date Time  Temp Pulse Resp B/P (MAP) Pulse Ox O2 Delivery O2 Flow Rate FiO2


 


1/25/20 10:45  85 22 130/92 (105) 95   


 


1/25/20 10:33  88 28     40


 


1/25/20 10:30  94 21 135/99 (111) 100   


 


1/25/20 10:29  99 28  100 Mechanical Ventilator  50





  98 28  100   


 


1/25/20 10:15  108 17 145/112 (123) 95   


 


1/25/20 10:00  98 21 142/104 (117) 98   


 


1/25/20 10:00   21   Endotracheal Tube  60


 


1/25/20 09:45  96 20 122/105 (111) 96   


 


1/25/20 09:30  73 27 128/92 (104) 96   


 


1/25/20 09:15  75 21 128/94 (105) 95   


 


1/25/20 09:00  80 26 136/98 (111) 97   


 


1/25/20 09:00   28   Endotracheal Tube  60


 


1/25/20 08:51   28   Endotracheal Tube 12.0 60


 


1/25/20 08:50   15   Endotracheal Tube  60


 


1/25/20 08:45  87 24 132/102 (112) 98   


 


1/25/20 08:43  71 28     50


 


1/25/20 08:30  81 26 130/89 (103) 98   


 


1/25/20 08:15  89 23 133/89 (104) 100   


 


1/25/20 08:00      Mechanical Ventilator  


 


1/25/20 08:00        60


 


1/25/20 08:00   27   Endotracheal Tube  60


 


1/25/20 08:00 98.6 77 27 134/81 (98) 99   


 


1/25/20 07:53  88      


 


1/25/20 07:30  67 26 134/95 (108) 100   


 


1/25/20 07:28  67 28     60


 


1/25/20 07:00   18   Endotracheal Tube  60


 


1/25/20 07:00  69 12 136/97 (110) 97   


 


1/25/20 06:00  70 19 136/97 (110) 98   


 


1/25/20 06:00      Mechanical Ventilator  60


 


1/25/20 05:48 97.9       


 


1/25/20 05:30  68 26 129/93 (105) 97   


 


1/25/20 05:16      Mechanical Ventilator  60


 


1/25/20 05:04  71 28     60


 


1/25/20 05:00   28   Mechanical Ventilator  60


 


1/25/20 05:00  70 27 127/87 (100) 98   


 


1/25/20 04:30  70 20 145/96 (112) 100   


 


1/25/20 04:00   28   Mechanical Ventilator  60


 


1/25/20 04:00      Mechanical Ventilator  


 


1/25/20 04:00        60


 


1/25/20 04:00 97.9 72 26 140/102 (115) 96   


 


1/25/20 04:00  73      


 


1/25/20 03:30  72 21 141/103 (116) 98   


 


1/25/20 03:15  71 26 147/103 (118) 99   


 


1/25/20 03:11  74 28     60


 


1/25/20 03:00  72 23 147/104 (118) 97   


 


1/25/20 03:00      Mechanical Ventilator  60


 


1/25/20 02:45  70 25 149/105 (120) 98   


 


1/25/20 02:30  68 28 149/103 (118) 97   


 


1/25/20 02:00   28   Mechanical Ventilator  60


 


1/25/20 02:00  65 26 147/98 (114) 97   


 


1/25/20 01:30  62 25 149/102 (118) 97   


 


1/25/20 01:10   28   Mechanical Ventilator  60


 


1/25/20 01:00  62 26 154/103 (120) 96   


 


1/25/20 01:00   28   Mechanical Ventilator  60


 


1/25/20 00:39  60 28     60


 


1/25/20 00:30  60 16 149/95 (113) 96   


 


1/25/20 00:00        60


 


1/25/20 00:00  58      


 


1/25/20 00:00 97.6 65 21 150/93 (112) 96   


 


1/25/20 00:00      Mechanical Ventilator  


 


1/25/20 00:00   28   Mechanical Ventilator  60


 


1/24/20 23:45  57 16 150/98 (115) 96   


 


1/24/20 23:30  59 16 149/101 (117) 97   


 


1/24/20 23:15  56 15 152/92 (112) 97   


 


1/24/20 23:12  56 28     60


 


1/24/20 23:06  62 14 149/96 (113) 98   


 


1/24/20 23:00   28   Mechanical Ventilator  60


 


1/24/20 23:00  56 12 171/86 (114) 96   


 


1/24/20 22:45  57 17 164/87 (112) 96   


 


1/24/20 22:30  57 13 168/82 (110) 96   


 


1/24/20 22:15  62 16 170/82 (111) 96   


 


1/24/20 22:00  58 13 161/79 (106) 95   


 


1/24/20 22:00   22   Mechanical Ventilator  60


 


1/24/20 21:45  59 17 164/85 (111) 95   


 


1/24/20 21:30  60 27 163/93 (116) 98   


 


1/24/20 21:29  60 28     60


 


1/24/20 21:15  59 26 167/92 (117) 96   


 


1/24/20 21:00  58 12 167/85 (112) 96   


 


1/24/20 21:00   23   Mechanical Ventilator  60


 


1/24/20 20:48  60 14 164/83 (110) 97   


 


1/24/20 20:45  60 21 170/87 (114) 97   


 


1/24/20 20:30  61 22 167/84 (111) 98   


 


1/24/20 20:28   28   Mechanical Ventilator  60


 


1/24/20 20:15  60 20 158/90 (112) 97   


 


1/24/20 20:00      Mechanical Ventilator  


 


1/24/20 20:00 97.6 57 18 161/80 (107) 97   


 


1/24/20 20:00   20   Mechanical Ventilator  60


 


1/24/20 20:00  59      


 


1/24/20 20:00        60


 


1/24/20 19:45  60 23 154/85 (108) 97   


 


1/24/20 19:30  59 24 145/74 (97) 97   


 


1/24/20 19:02  58 28     60


 


1/24/20 19:00  59 16 133/79 (97) 96   


 


1/24/20 19:00   15   Endotracheal Tube  100


 


1/24/20 18:00   19   Endotracheal Tube  100


 


1/24/20 18:00  65 19 133/89 (104) 97   


 


1/24/20 17:30  58 14 122/72 (89) 95   


 


1/24/20 17:00   24   Endotracheal Tube  100


 


1/24/20 17:00  59 24 120/65 (83) 95   


 


1/24/20 16:56  63 28     60


 


1/24/20 16:30  58 13 130/69 (89) 96   


 


1/24/20 16:00 98.5 59 27 137/68 (91) 95   


 


1/24/20 16:00   27   Endotracheal Tube  100


 


1/24/20 16:00      Mechanical Ventilator  


 


1/24/20 16:00        100


 


1/24/20 15:45  60 20 129/68 (88) 95   


 


1/24/20 15:32  59      


 


1/24/20 15:30  61 24 128/62 (84) 96   


 


1/24/20 15:15  62 22 132/69 (90) 99   


 


1/24/20 15:13  64 28     60


 


1/24/20 15:00  60 23 143/97 (112) 96   


 


1/24/20 15:00   23   Endotracheal Tube  100


 


1/24/20 14:30  59 26 146/76 (99) 97   


 


1/24/20 14:25   25   Endotracheal Tube 12.0 100


 


1/24/20 14:00  59 25 134/76 (95) 96   


 


1/24/20 14:00   25   Endotracheal Tube  100


 


1/24/20 13:30  57 28 130/71 (90) 95   


 


1/24/20 13:14  60 28     60


 


1/24/20 13:00  56 27 127/65 (85) 94   


 


1/24/20 13:00   27   Endotracheal Tube  100


 


1/24/20 12:45  60 28 128/66 (86) 95   


 


1/24/20 12:33    128/67    


 


1/24/20 12:30  61 27 128/67 (87) 94   


 


1/24/20 12:15  61 28 130/68 (88) 95   


 


1/24/20 12:04        100


 


1/24/20 12:01  63      


 


1/24/20 12:00      Mechanical Ventilator  


 


1/24/20 12:00   26   Endotracheal Tube  100


 


1/24/20 12:00 98.4 60 26 131/66 (87) 95   


 


1/24/20 11:45  59 27 145/67 (93) 94   


 


1/24/20 11:30  63 22 143/80 (101) 95   


 


1/24/20 11:15  65 28 139/69 (92) 95   


 


1/24/20 11:00  68 22 143/72 (95) 95   


 


1/24/20 11:00   22   Endotracheal Tube  100

















Intake and Output  


 


 1/24/20 1/25/20





 19:00 07:00


 


Intake Total 704.75 ml 284 ml


 


Output Total 790 ml 410 ml


 


Balance -85.25 ml -126 ml


 


  


 


Intake IV Total 704.75 ml 284 ml


 


Output Urine Total 790 ml 410 ml








Laboratory Tests


1/25/20 06:00: 


Hemoglobin A1c 7.2H, Troponin I 1.453H


1/25/20 09:07: 


White Blood Count 9.7, Red Blood Count 6.23H, Hemoglobin 16.3, Hematocrit 51.9, 

Mean Corpuscular Volume 83, Mean Corpuscular Hemoglobin 26.3L, Mean Corpuscular 

Hemoglobin Concent 31.5L, Red Cell Distribution Width 17.8H, Platelet Count 155

, Mean Platelet Volume 6.8, Neutrophils (%) (Auto) , Lymphocytes (%) (Auto) , 

Monocytes (%) (Auto) , Eosinophils (%) (Auto) , Basophils (%) (Auto) , 

Neutrophils % (Manual) [Pending], Lymphocytes % (Manual) [Pending], Platelet 

Estimate [Pending], Platelet Morphology [Pending], Sodium Level 141, Potassium 

Level 3.4L, Chloride Level 101, Carbon Dioxide Level 35H, Anion Gap 5, Blood 

Urea Nitrogen 22H, Creatinine 0.8, Estimat Glomerular Filtration Rate > 60, 

Glucose Level 185H, Calcium Level 8.9, Phosphorus Level 3.2, Magnesium Level 

1.6L


Height (Feet):  6


Height (Inches):  3.00


Weight (Pounds):  235


General Appearance:  no apparent distress


EENT:  other - vented


Cardiovascular:  tachycardia


Respiratory/Chest:  decreased breath sounds


Abdomen:  soft











Lawson Vergara MD Jan 25, 2020 10:57

## 2020-01-25 NOTE — NUR
NURSE NOTES:

ETT placement is at 4cm above the paige per Xray.Secretions pimkish with occasional blood 
tinge. No active bleeding noted

## 2020-01-25 NOTE — NUR
NURSE NOTES:

Pt extremely restless and attempting to get up. Fentanyl gtt increased to 400mcg/h. Will 
continue to monitor sedation status and vial signs.

## 2020-01-25 NOTE — NUR
: REVIEW









SI: RESP FAILURE .  HYPOXEMIA . ACUTE MI . METABOLIC ACIDOSIS

T 98.1 HR 83 RR 28  /107 SAT 97% ETT FIO2 60%

TROP 1.453 T-BILI 1.5 D-BILI 0.6 AST 59  

ABG: PH 7.563 PO2 50.9 HCO3 38.9 SAT 91.3 BASE EXCESS 14.9 





IS:  CEFTRIAXONE IV Q24HR 

FENTANYL IV Q24HR 

MAGNESIUM IV QHR 

SOLU-MEDROL IV Q8HR 

PROTONIX IV Q12HR 

NITRO PATCH Q24HR

ISOSORBIDE PO Q6HR 





***ICU STATUS***

DCP: PATIENT IS FROM [facility]

## 2020-01-26 VITALS — DIASTOLIC BLOOD PRESSURE: 65 MMHG | SYSTOLIC BLOOD PRESSURE: 104 MMHG

## 2020-01-26 VITALS — DIASTOLIC BLOOD PRESSURE: 99 MMHG | SYSTOLIC BLOOD PRESSURE: 134 MMHG

## 2020-01-26 VITALS — DIASTOLIC BLOOD PRESSURE: 70 MMHG | SYSTOLIC BLOOD PRESSURE: 102 MMHG

## 2020-01-26 VITALS — DIASTOLIC BLOOD PRESSURE: 87 MMHG | SYSTOLIC BLOOD PRESSURE: 121 MMHG

## 2020-01-26 VITALS — DIASTOLIC BLOOD PRESSURE: 75 MMHG | SYSTOLIC BLOOD PRESSURE: 117 MMHG

## 2020-01-26 VITALS — DIASTOLIC BLOOD PRESSURE: 65 MMHG | SYSTOLIC BLOOD PRESSURE: 98 MMHG

## 2020-01-26 VITALS — DIASTOLIC BLOOD PRESSURE: 77 MMHG | SYSTOLIC BLOOD PRESSURE: 92 MMHG

## 2020-01-26 VITALS — SYSTOLIC BLOOD PRESSURE: 97 MMHG | DIASTOLIC BLOOD PRESSURE: 72 MMHG

## 2020-01-26 VITALS — DIASTOLIC BLOOD PRESSURE: 73 MMHG | SYSTOLIC BLOOD PRESSURE: 106 MMHG

## 2020-01-26 VITALS — SYSTOLIC BLOOD PRESSURE: 113 MMHG | DIASTOLIC BLOOD PRESSURE: 71 MMHG

## 2020-01-26 VITALS — DIASTOLIC BLOOD PRESSURE: 77 MMHG | SYSTOLIC BLOOD PRESSURE: 114 MMHG

## 2020-01-26 VITALS — DIASTOLIC BLOOD PRESSURE: 72 MMHG | SYSTOLIC BLOOD PRESSURE: 111 MMHG

## 2020-01-26 VITALS — DIASTOLIC BLOOD PRESSURE: 84 MMHG | SYSTOLIC BLOOD PRESSURE: 115 MMHG

## 2020-01-26 VITALS — DIASTOLIC BLOOD PRESSURE: 67 MMHG | SYSTOLIC BLOOD PRESSURE: 99 MMHG

## 2020-01-26 VITALS — DIASTOLIC BLOOD PRESSURE: 77 MMHG | SYSTOLIC BLOOD PRESSURE: 93 MMHG

## 2020-01-26 VITALS — SYSTOLIC BLOOD PRESSURE: 117 MMHG | DIASTOLIC BLOOD PRESSURE: 87 MMHG

## 2020-01-26 VITALS — SYSTOLIC BLOOD PRESSURE: 105 MMHG | DIASTOLIC BLOOD PRESSURE: 71 MMHG

## 2020-01-26 VITALS — DIASTOLIC BLOOD PRESSURE: 80 MMHG | SYSTOLIC BLOOD PRESSURE: 113 MMHG

## 2020-01-26 VITALS — SYSTOLIC BLOOD PRESSURE: 113 MMHG | DIASTOLIC BLOOD PRESSURE: 82 MMHG

## 2020-01-26 VITALS — SYSTOLIC BLOOD PRESSURE: 95 MMHG | DIASTOLIC BLOOD PRESSURE: 67 MMHG

## 2020-01-26 VITALS — SYSTOLIC BLOOD PRESSURE: 99 MMHG | DIASTOLIC BLOOD PRESSURE: 65 MMHG

## 2020-01-26 VITALS — DIASTOLIC BLOOD PRESSURE: 69 MMHG | SYSTOLIC BLOOD PRESSURE: 104 MMHG

## 2020-01-26 VITALS — DIASTOLIC BLOOD PRESSURE: 106 MMHG | SYSTOLIC BLOOD PRESSURE: 146 MMHG

## 2020-01-26 VITALS — SYSTOLIC BLOOD PRESSURE: 99 MMHG | DIASTOLIC BLOOD PRESSURE: 68 MMHG

## 2020-01-26 VITALS — SYSTOLIC BLOOD PRESSURE: 106 MMHG | DIASTOLIC BLOOD PRESSURE: 72 MMHG

## 2020-01-26 VITALS — DIASTOLIC BLOOD PRESSURE: 72 MMHG | SYSTOLIC BLOOD PRESSURE: 107 MMHG

## 2020-01-26 VITALS — SYSTOLIC BLOOD PRESSURE: 104 MMHG | DIASTOLIC BLOOD PRESSURE: 62 MMHG

## 2020-01-26 VITALS — DIASTOLIC BLOOD PRESSURE: 77 MMHG | SYSTOLIC BLOOD PRESSURE: 111 MMHG

## 2020-01-26 VITALS — SYSTOLIC BLOOD PRESSURE: 113 MMHG | DIASTOLIC BLOOD PRESSURE: 79 MMHG

## 2020-01-26 VITALS — SYSTOLIC BLOOD PRESSURE: 90 MMHG | DIASTOLIC BLOOD PRESSURE: 66 MMHG

## 2020-01-26 VITALS — DIASTOLIC BLOOD PRESSURE: 65 MMHG | SYSTOLIC BLOOD PRESSURE: 93 MMHG

## 2020-01-26 VITALS — DIASTOLIC BLOOD PRESSURE: 64 MMHG | SYSTOLIC BLOOD PRESSURE: 94 MMHG

## 2020-01-26 VITALS — DIASTOLIC BLOOD PRESSURE: 78 MMHG | SYSTOLIC BLOOD PRESSURE: 112 MMHG

## 2020-01-26 VITALS — DIASTOLIC BLOOD PRESSURE: 67 MMHG | SYSTOLIC BLOOD PRESSURE: 102 MMHG

## 2020-01-26 VITALS — DIASTOLIC BLOOD PRESSURE: 70 MMHG | SYSTOLIC BLOOD PRESSURE: 109 MMHG

## 2020-01-26 VITALS — SYSTOLIC BLOOD PRESSURE: 105 MMHG | DIASTOLIC BLOOD PRESSURE: 75 MMHG

## 2020-01-26 VITALS — DIASTOLIC BLOOD PRESSURE: 80 MMHG | SYSTOLIC BLOOD PRESSURE: 106 MMHG

## 2020-01-26 VITALS — DIASTOLIC BLOOD PRESSURE: 69 MMHG | SYSTOLIC BLOOD PRESSURE: 99 MMHG

## 2020-01-26 VITALS — DIASTOLIC BLOOD PRESSURE: 66 MMHG | SYSTOLIC BLOOD PRESSURE: 100 MMHG

## 2020-01-26 VITALS — DIASTOLIC BLOOD PRESSURE: 72 MMHG | SYSTOLIC BLOOD PRESSURE: 105 MMHG

## 2020-01-26 VITALS — SYSTOLIC BLOOD PRESSURE: 116 MMHG | DIASTOLIC BLOOD PRESSURE: 84 MMHG

## 2020-01-26 VITALS — DIASTOLIC BLOOD PRESSURE: 65 MMHG | SYSTOLIC BLOOD PRESSURE: 99 MMHG

## 2020-01-26 LAB
ADD MANUAL DIFF: YES
ALBUMIN SERPL-MCNC: 2.5 G/DL (ref 3.4–5)
ALBUMIN/GLOB SERPL: 1 {RATIO} (ref 1–2.7)
ALP SERPL-CCNC: 86 U/L (ref 46–116)
ALT SERPL-CCNC: 395 U/L (ref 12–78)
ANION GAP SERPL CALC-SCNC: 5 MMOL/L (ref 5–15)
AST SERPL-CCNC: 34 U/L (ref 15–37)
BILIRUB DIRECT SERPL-MCNC: 0.5 MG/DL (ref 0–0.3)
BILIRUB SERPL-MCNC: 1.3 MG/DL (ref 0.2–1)
BUN SERPL-MCNC: 23 MG/DL (ref 7–18)
CALCIUM SERPL-MCNC: 8.3 MG/DL (ref 8.5–10.1)
CHLORIDE SERPL-SCNC: 102 MMOL/L (ref 98–107)
CO2 SERPL-SCNC: 37 MMOL/L (ref 21–32)
CREAT SERPL-MCNC: 0.6 MG/DL (ref 0.55–1.3)
ERYTHROCYTE [DISTWIDTH] IN BLOOD BY AUTOMATED COUNT: 17.7 % (ref 11.6–14.8)
GLOBULIN SER-MCNC: 2.6 G/DL
HCT VFR BLD CALC: 52.3 % (ref 42–52)
HGB BLD-MCNC: 16.3 G/DL (ref 14.2–18)
MCV RBC AUTO: 84 FL (ref 80–99)
PHOSPHATE SERPL-MCNC: 6 MG/DL (ref 2.5–4.9)
PLATELET # BLD: 153 K/UL (ref 150–450)
POTASSIUM SERPL-SCNC: 3.5 MMOL/L (ref 3.5–5.1)
RBC # BLD AUTO: 6.2 M/UL (ref 4.7–6.1)
SODIUM SERPL-SCNC: 144 MMOL/L (ref 136–145)
WBC # BLD AUTO: 11.2 K/UL (ref 4.8–10.8)

## 2020-01-26 RX ADMIN — INSULIN ASPART SCH UNITS: 100 INJECTION, SOLUTION INTRAVENOUS; SUBCUTANEOUS at 17:36

## 2020-01-26 RX ADMIN — TRAZODONE HYDROCHLORIDE SCH MG: 100 TABLET ORAL at 20:47

## 2020-01-26 RX ADMIN — PHENYLEPHRINE HYDROCHLORIDE SCH MLS/HR: 10 INJECTION, SOLUTION INTRAMUSCULAR; INTRAVENOUS; SUBCUTANEOUS at 16:45

## 2020-01-26 RX ADMIN — LORAZEPAM PRN MG: 2 INJECTION, SOLUTION INTRAMUSCULAR; INTRAVENOUS at 09:53

## 2020-01-26 RX ADMIN — NITROGLYCERIN SCH PATCH: 0.4 PATCH TRANSDERMAL at 12:46

## 2020-01-26 RX ADMIN — INSULIN DETEMIR SCH UNITS: 100 INJECTION, SOLUTION SUBCUTANEOUS at 09:55

## 2020-01-26 RX ADMIN — METHYLPREDNISOLONE SODIUM SUCCINATE SCH MG: 40 INJECTION, POWDER, LYOPHILIZED, FOR SOLUTION INTRAMUSCULAR; INTRAVENOUS at 22:08

## 2020-01-26 RX ADMIN — PANTOPRAZOLE SODIUM SCH MG: 40 INJECTION, POWDER, FOR SOLUTION INTRAVENOUS at 09:56

## 2020-01-26 RX ADMIN — ASPIRIN 81 MG SCH MG: 81 TABLET ORAL at 09:56

## 2020-01-26 RX ADMIN — INSULIN ASPART SCH UNITS: 100 INJECTION, SOLUTION INTRAVENOUS; SUBCUTANEOUS at 05:51

## 2020-01-26 RX ADMIN — SODIUM CHLORIDE SCH MLS/HR: 0.9 INJECTION INTRAVENOUS at 16:18

## 2020-01-26 RX ADMIN — INSULIN DETEMIR SCH UNITS: 100 INJECTION, SOLUTION SUBCUTANEOUS at 21:13

## 2020-01-26 RX ADMIN — METHYLPREDNISOLONE SODIUM SUCCINATE SCH MG: 40 INJECTION, POWDER, LYOPHILIZED, FOR SOLUTION INTRAMUSCULAR; INTRAVENOUS at 12:47

## 2020-01-26 RX ADMIN — INSULIN ASPART SCH UNITS: 100 INJECTION, SOLUTION INTRAVENOUS; SUBCUTANEOUS at 21:13

## 2020-01-26 RX ADMIN — METHYLPREDNISOLONE SODIUM SUCCINATE SCH MG: 40 INJECTION, POWDER, LYOPHILIZED, FOR SOLUTION INTRAMUSCULAR; INTRAVENOUS at 05:49

## 2020-01-26 RX ADMIN — PANTOPRAZOLE SODIUM SCH MG: 40 INJECTION, POWDER, FOR SOLUTION INTRAVENOUS at 20:47

## 2020-01-26 RX ADMIN — LORAZEPAM PRN MG: 2 INJECTION, SOLUTION INTRAMUSCULAR; INTRAVENOUS at 15:52

## 2020-01-26 RX ADMIN — INSULIN ASPART SCH UNITS: 100 INJECTION, SOLUTION INTRAVENOUS; SUBCUTANEOUS at 12:48

## 2020-01-26 RX ADMIN — PHENYLEPHRINE HYDROCHLORIDE SCH MLS/HR: 10 INJECTION, SOLUTION INTRAMUSCULAR; INTRAVENOUS; SUBCUTANEOUS at 08:06

## 2020-01-26 NOTE — NUR
NURSE NOTES:

Pt converted back to SR, HR is 71, BP 89/62. Pt is sedated, no signs of distress noted.

## 2020-01-26 NOTE — NUR
NURSE NOTES:

telephone consent for IV contrast obtained by father, Darren Fritz. Second RN witnessed by 
MILADIS Montana.

## 2020-01-26 NOTE — NUR
NURSE NOTES:

Tube feeding started per Dr. Duffy. Vital AF 1.2@30ml/hr running via OGT, HOB 35 degrees.

## 2020-01-26 NOTE — NUR
NURSE NOTES:

Pt's resting in bed, comfortably, afebrile, in no acute distress. Fentanyl is titrating 
down. Will continue to monitor.

## 2020-01-26 NOTE — NEPHROLOGY PROGRESS NOTE
Assessment/Plan


Problem List:  


(1) Electrolyte imbalance


(2) Acute respiratory failure


(3) Elevated troponin


(4) Diabetes mellitus


Plan





per cardiologist


add nitrates


mag and K supplement as needed


taper steroids


per orders





Subjective


ROS Limited/Unobtainable:  Yes





Objective


Objective





Last 24 Hour Vital Signs








  Date Time  Temp Pulse Resp B/P (MAP) Pulse Ox O2 Delivery O2 Flow Rate FiO2


 


1/26/20 10:55  70  89/62    


 


1/26/20 10:38  70 14     60


 


1/26/20 10:04  144      


 


1/26/20 10:00  137 15 93/77 (82) 96   


 


1/26/20 09:41  134 13 111/77 (88) 97   


 


1/26/20 09:30  133 12 90/66 (74) 97   


 


1/26/20 09:00  137 17 97/72 (80) 94   


 


1/26/20 08:44  131 14     60


 


1/26/20 08:36 97.4       


 


1/26/20 08:31  167 22 92/77 (82) 95   


 


1/26/20 08:27  216      


 


1/26/20 08:26  219  117/75    


 


1/26/20 08:06   20   Mechanical Ventilator 12.0 60


 


1/26/20 08:00  171 14 105/75 (85) 94   


 


1/26/20 08:00      Mechanical Ventilator  


 


1/26/20 08:00  96      


 


1/26/20 08:00        70


 


1/26/20 07:30  142 18 146/106 (119) 90   


 


1/26/20 07:00 97.2 92 11 114/77 (89) 93   


 


1/26/20 06:49  97 19     60


 


1/26/20 06:22  88 14     


 


1/26/20 06:00  91 10 117/75 (89) 92   


 


1/26/20 06:00   16   Mechanical Ventilator  60


 


1/26/20 05:50    113/79    


 


1/26/20 05:30  95 11 113/79 (90) 96   


 


1/26/20 05:24  99 13     60


 


1/26/20 05:00  98 12 121/87 (98) 93   


 


1/26/20 05:00   14   Mechanical Ventilator  60


 


1/26/20 04:30  96 17 134/99 (111) 100   


 


1/26/20 04:00      Mechanical Ventilator  


 


1/26/20 04:00        60


 


1/26/20 04:00   14   Mechanical Ventilator  60


 


1/26/20 04:00 97.4 85 12 113/71 (85) 96   


 


1/26/20 04:00  96      


 


1/26/20 03:31  84 12     60


 


1/26/20 03:30  80 12 116/84 (95) 97   


 


1/26/20 03:00  78 13 115/84 (94) 98   


 


1/26/20 03:00   15   Mechanical Ventilator  60


 


1/26/20 02:30  77 12 113/80 (91) 96   


 


1/26/20 02:00  80 12 113/82 (92) 95   


 


1/26/20 02:00   14   Mechanical Ventilator  60


 


1/26/20 01:30  75 13     60


 


1/26/20 01:30  77 12 112/78 (89) 98   


 


1/26/20 01:00  78 13 106/80 (89) 98   


 


1/26/20 01:00   14   Mechanical Ventilator  60


 


1/26/20 00:30  74 12 117/87 (97) 96   


 


1/26/20 00:03    109/70    


 


1/26/20 00:00  73      


 


1/26/20 00:00   14   Mechanical Ventilator  60


 


1/26/20 00:00 98.5 73 11 109/70 (83) 96   


 


1/26/20 00:00      Mechanical Ventilator  


 


1/26/20 00:00        60


 


1/25/20 23:30  75 13 110/77 (88) 96   


 


1/25/20 23:00  73 12 109/77 (88) 94   


 


1/25/20 23:00   18   Mechanical Ventilator  60


 


1/25/20 23:00  74 12     60


 


1/25/20 22:41   14   Mechanical Ventilator  60


 


1/25/20 22:30  78 13 113/81 (92) 94   


 


1/25/20 22:00  81 13 114/76 (89) 93   


 


1/25/20 22:00   18   Mechanical Ventilator  60


 


1/25/20 21:30  82 12     60


 


1/25/20 21:30  85 12 115/83 (94) 97   


 


1/25/20 21:00  113 18 127/80 (96) 99   


 


1/25/20 21:00   22   Mechanical Ventilator  50


 


1/25/20 20:30  76 15 115/91 (99) 97   


 


1/25/20 20:00        50


 


1/25/20 20:00  88 17 120/95 (103) 97   


 


1/25/20 20:00      Mechanical Ventilator  


 


1/25/20 20:00   18   Mechanical Ventilator  50


 


1/25/20 20:00  89      


 


1/25/20 19:30  64 26 101/81 (88) 97   


 


1/25/20 19:30  85 12     60


 


1/25/20 19:00   28   Endotracheal Tube  60


 


1/25/20 19:00 97.5 62 28 98/71 (80) 94   


 


1/25/20 18:45  76 22 117/80 (92) 97   


 


1/25/20 18:30  65 26 109/68 (82) 92   


 


1/25/20 18:15  70 28 108/76 (87) 91   


 


1/25/20 18:00   18   Endotracheal Tube  60


 


1/25/20 18:00  73 18 111/81 (91) 93   


 


1/25/20 17:47    114/85    


 


1/25/20 17:45  73 27 114/85 (95) 95   


 


1/25/20 17:30  96 21 146/103 (117) 100   


 


1/25/20 17:15  73 26 130/99 (109) 100   


 


1/25/20 17:00  102 19 128/98 (108) 79   


 


1/25/20 17:00   18   Endotracheal Tube  100


 


1/25/20 16:57  101 29     60


 


1/25/20 16:45  83 22 123/91 (102) 100   


 


1/25/20 16:30  77 19 131/95 (107) 100   


 


1/25/20 16:19  72      


 


1/25/20 16:15  68 18 111/80 (90) 97   


 


1/25/20 16:00 98.2 78 27 116/85 (95) 93   


 


1/25/20 16:00   12   Endotracheal Tube  100


 


1/25/20 16:00      Mechanical Ventilator  


 


1/25/20 16:00        100


 


1/25/20 15:45  86 22 132/95 (107) 97   


 


1/25/20 15:30   27   Endotracheal Tube  50


 


1/25/20 15:30  85 27 128/90 (103) 96   


 


1/25/20 15:27  76 28     50


 


1/25/20 15:15  101 23 136/106 (116) 98   


 


1/25/20 15:00  75 20 123/86 (98) 94   


 


1/25/20 15:00   20   Endotracheal Tube  50


 


1/25/20 14:45  85 22 127/93 (104) 97   


 


1/25/20 14:30  95 19 118/101 (107) 94   


 


1/25/20 14:15  69 25 120/87 (98) 92   


 


1/25/20 14:00   27   Endotracheal Tube  60


 


1/25/20 14:00  73 27 120/89 (99) 94   


 


1/25/20 13:45  75 27 122/85 (97) 95   


 


1/25/20 13:30  75 27 114/94 (101) 98   


 


1/25/20 13:25    122/91    


 


1/25/20 13:20   28   Mechanical Ventilator 12.0 50


 


1/25/20 13:15  87 17 125/91 (102) 92   


 


1/25/20 13:10  109 22 135/107 (116) 97   


 


1/25/20 13:03  81 28     50


 


1/25/20 13:00  100 23 135/107 (116) 97   


 


1/25/20 13:00   21   Endotracheal Tube  60


 


1/25/20 12:45  74 27 120/85 (97) 96   


 


1/25/20 12:30  73 25 123/87 (99) 96   


 


1/25/20 12:15  93 23 121/91 (101) 99   


 


1/25/20 12:00      Mechanical Ventilator  


 


1/25/20 12:00 98.1 83 10 120/91 (101) 91   


 


1/25/20 12:00        60


 


1/25/20 12:00   18   Endotracheal Tube  60


 


1/25/20 12:00        60


 


1/25/20 11:46  84      


 


1/25/20 11:45  100 24 127/93 (104) 95   


 


1/25/20 11:40    149/107    


 


1/25/20 11:30  97 19 149/107 (121) 97   

















Intake and Output  


 


 1/25/20 1/26/20





 19:00 07:00


 


Intake Total 964.99 ml 460 ml


 


Output Total 935 ml 400 ml


 


Balance 29.99 ml 60 ml


 


  


 


Intake Oral  0 ml


 


IV Total 964.99 ml 400 ml


 


Other  60 ml


 


Output Urine Total 935 ml 400 ml








Laboratory Tests


1/25/20 17:30: 


Arterial Blood pH 7.458H, Arterial Blood Partial Pressure CO2 46.0H, Arterial 

Blood Partial Pressure O2 102.3H, Arterial Blood HCO3 31.8H, Arterial Blood 

Oxygen Saturation 97.2, Arterial Blood Base Excess 6.8H, Frandy Test Positive


1/26/20 04:05: 


White Blood Count 11.2H, Red Blood Count 6.20H, Hemoglobin 16.3, Hematocrit 

52.3H, Mean Corpuscular Volume 84, Mean Corpuscular Hemoglobin 26.3L, Mean 

Corpuscular Hemoglobin Concent 31.2L, Red Cell Distribution Width 17.7H, 

Platelet Count 153, Mean Platelet Volume 6.3L, Neutrophils (%) (Auto) , 

Lymphocytes (%) (Auto) , Monocytes (%) (Auto) , Eosinophils (%) (Auto) , 

Basophils (%) (Auto) , Differential Total Cells Counted 100, Neutrophils % (

Manual) 93H, Lymphocytes % (Manual) 5L, Monocytes % (Manual) 2, Eosinophils % (

Manual) 0, Basophils % (Manual) 0, Band Neutrophils 0, Platelet Estimate 

Adequate, Platelet Morphology Normal, Anisocytosis 1+, Sodium Level 144, 

Potassium Level 3.5, Chloride Level 102, Carbon Dioxide Level 37H, Anion Gap 5, 

Blood Urea Nitrogen 23H, Creatinine 0.6, Estimat Glomerular Filtration Rate > 60

, Glucose Level 158H, Uric Acid 7.0, Calcium Level 8.3L, Phosphorus Level 6.0H, 

Magnesium Level 1.7L, Total Bilirubin 1.3H, Direct Bilirubin 0.5H, Aspartate 

Amino Transf (AST/SGOT) 34, Alanine Aminotransferase (ALT/SGPT) 395H, Alkaline 

Phosphatase 86, Troponin I 1.068H, C-Reactive Protein, Quantitative 0.8, Pro-B-

Type Natriuretic Peptide 60928O, Total Protein 5.1L, Albumin 2.5L, Globulin 2.6

, Albumin/Globulin Ratio 1.0


1/26/20 08:25: 


Arterial Blood pH 7.269L, Arterial Blood Partial Pressure CO2 75.9*H, Arterial 

Blood Partial Pressure O2 109.8H, Arterial Blood HCO3 34.0H, Arterial Blood 

Oxygen Saturation 97.0, Arterial Blood Base Excess 3.6H, Frandy Test Positive, 

Troponin I 0.697H


Height (Feet):  6


Height (Inches):  3.00


Weight (Pounds):  240


General Appearance:  mild distress, agitated


Cardiovascular:  tachycardia


Respiratory/Chest:  decreased breath sounds


Abdomen:  distended











Lawson Vergara MD Jan 26, 2020 11:03

## 2020-01-26 NOTE — NUR
NURSE NOTES:

Urine output tea colored, 30ml/h. No bleeding noted. Pt remains NPO. Dr Lozano here saw pt.

## 2020-01-26 NOTE — NUR
NURSE NOTES:

Awakened for AM care. Oral care done as well. Pt follow commands but still impulsive. Skin 
warm, dry and intact. Decresed Fentanyl gtt down to 300mcg/h. IV sites intact. No BM

## 2020-01-26 NOTE — PULMONOLOGY PROGRESS NOTE
Assessment/Plan


Assessment/Plan


IMPRESSION:


1. Hypoglycemia. Seen by endocrine; now resolved


2. Metabolic encephalopathy.


3. Chronic obstructive pulmonary disease with exacerbation. Now intubated 


4. Acute myocardial infarction.


5. Hyponatremia. Corrected


6.Acute on chronic congestive heart failure, possibly systolic and


diastolic.





PLAN:


1. Continue vent


2. Serial troponin.


3. Anti-platelet therapy.


4. On Fentanyl


5. Current present care


6. DVT prophylaxis.


7. Empiric antimicrobials.


8. Echocardiogram.


9. Metabolic profile.


10. Abdominal ultrasound.





GI, endocrine and cardiology consults appreciated


Will discuss psych medication regimen with Dr Harris














  ______________________________________________


True duffy M.D.





Subjective


Interval Events:  SVT this AM; received metoprolol and digoxin


Constitutional:  Reports: no symptoms


HEENT:  Repors: no symptoms


Respiratory:  Reports: no symptoms


Cardiovascular:  Reports: no symptoms


Gastrointestinal/Abdominal:  Reports: no symptoms


Genitourinary:  Reports: no symptoms


Neurologic:  Reports: no symptoms


Allergies:  


Coded Allergies:  


     No Known Allergies (Unverified , 1/16/18)





Objective





Last 24 Hour Vital Signs








  Date Time  Temp Pulse Resp B/P (MAP) Pulse Ox O2 Delivery O2 Flow Rate FiO2


 


1/26/20 11:00   20   Mechanical Ventilator  60


 


1/26/20 10:55  70  89/62    


 


1/26/20 10:38  70 14     60


 


1/26/20 10:04  144      


 


1/26/20 10:00   16   Mechanical Ventilator  60


 


1/26/20 10:00  137 15 93/77 (82) 96   


 


1/26/20 09:41  134 13 111/77 (88) 97   


 


1/26/20 09:30  133 12 90/66 (74) 97   


 


1/26/20 09:00   24   Mechanical Ventilator  60


 


1/26/20 09:00  137 17 97/72 (80) 94   


 


1/26/20 08:44  131 14     60


 


1/26/20 08:36 97.4       


 


1/26/20 08:31  167 22 92/77 (82) 95   


 


1/26/20 08:27  216      


 


1/26/20 08:26  219  117/75    


 


1/26/20 08:06   20   Mechanical Ventilator 12.0 60


 


1/26/20 08:00  171 14 105/75 (85) 94   


 


1/26/20 08:00      Mechanical Ventilator  


 


1/26/20 08:00  96      


 


1/26/20 08:00   20   Mechanical Ventilator  60


 


1/26/20 08:00        70


 


1/26/20 07:30  142 18 146/106 (119) 90   


 


1/26/20 07:00   20   Mechanical Ventilator  60


 


1/26/20 07:00 97.2 92 11 114/77 (89) 93   


 


1/26/20 06:49  97 19     60


 


1/26/20 06:22  88 14     


 


1/26/20 06:00  91 10 117/75 (89) 92   


 


1/26/20 06:00   16   Mechanical Ventilator  60


 


1/26/20 05:50    113/79    


 


1/26/20 05:30  95 11 113/79 (90) 96   


 


1/26/20 05:24  99 13     60


 


1/26/20 05:00  98 12 121/87 (98) 93   


 


1/26/20 05:00   14   Mechanical Ventilator  60


 


1/26/20 04:30  96 17 134/99 (111) 100   


 


1/26/20 04:00      Mechanical Ventilator  


 


1/26/20 04:00        60


 


1/26/20 04:00   14   Mechanical Ventilator  60


 


1/26/20 04:00 97.4 85 12 113/71 (85) 96   


 


1/26/20 04:00  96      


 


1/26/20 03:31  84 12     60


 


1/26/20 03:30  80 12 116/84 (95) 97   


 


1/26/20 03:00  78 13 115/84 (94) 98   


 


1/26/20 03:00   15   Mechanical Ventilator  60


 


1/26/20 02:30  77 12 113/80 (91) 96   


 


1/26/20 02:00  80 12 113/82 (92) 95   


 


1/26/20 02:00   14   Mechanical Ventilator  60


 


1/26/20 01:30  75 13     60


 


1/26/20 01:30  77 12 112/78 (89) 98   


 


1/26/20 01:00  78 13 106/80 (89) 98   


 


1/26/20 01:00   14   Mechanical Ventilator  60


 


1/26/20 00:30  74 12 117/87 (97) 96   


 


1/26/20 00:03    109/70    


 


1/26/20 00:00  73      


 


1/26/20 00:00   14   Mechanical Ventilator  60


 


1/26/20 00:00 98.5 73 11 109/70 (83) 96   


 


1/26/20 00:00      Mechanical Ventilator  


 


1/26/20 00:00        60


 


1/25/20 23:30  75 13 110/77 (88) 96   


 


1/25/20 23:00  73 12 109/77 (88) 94   


 


1/25/20 23:00   18   Mechanical Ventilator  60


 


1/25/20 23:00  74 12     60


 


1/25/20 22:41   14   Mechanical Ventilator  60


 


1/25/20 22:30  78 13 113/81 (92) 94   


 


1/25/20 22:00  81 13 114/76 (89) 93   


 


1/25/20 22:00   18   Mechanical Ventilator  60


 


1/25/20 21:30  82 12     60


 


1/25/20 21:30  85 12 115/83 (94) 97   


 


1/25/20 21:00  113 18 127/80 (96) 99   


 


1/25/20 21:00   22   Mechanical Ventilator  50


 


1/25/20 20:30  76 15 115/91 (99) 97   


 


1/25/20 20:00        50


 


1/25/20 20:00  88 17 120/95 (103) 97   


 


1/25/20 20:00      Mechanical Ventilator  


 


1/25/20 20:00   18   Mechanical Ventilator  50


 


1/25/20 20:00  89      


 


1/25/20 19:30  64 26 101/81 (88) 97   


 


1/25/20 19:30  85 12     60


 


1/25/20 19:00   28   Endotracheal Tube  60


 


1/25/20 19:00 97.5 62 28 98/71 (80) 94   


 


1/25/20 18:45  76 22 117/80 (92) 97   


 


1/25/20 18:30  65 26 109/68 (82) 92   


 


1/25/20 18:15  70 28 108/76 (87) 91   


 


1/25/20 18:00   18   Endotracheal Tube  60


 


1/25/20 18:00  73 18 111/81 (91) 93   


 


1/25/20 17:47    114/85    


 


1/25/20 17:45  73 27 114/85 (95) 95   


 


1/25/20 17:30  96 21 146/103 (117) 100   


 


1/25/20 17:15  73 26 130/99 (109) 100   


 


1/25/20 17:00  102 19 128/98 (108) 79   


 


1/25/20 17:00   18   Endotracheal Tube  100


 


1/25/20 16:57  101 29     60


 


1/25/20 16:45  83 22 123/91 (102) 100   


 


1/25/20 16:30  77 19 131/95 (107) 100   


 


1/25/20 16:19  72      


 


1/25/20 16:15  68 18 111/80 (90) 97   


 


1/25/20 16:00 98.2 78 27 116/85 (95) 93   


 


1/25/20 16:00   12   Endotracheal Tube  100


 


1/25/20 16:00      Mechanical Ventilator  


 


1/25/20 16:00        100


 


1/25/20 15:45  86 22 132/95 (107) 97   


 


1/25/20 15:30   27   Endotracheal Tube  50


 


1/25/20 15:30  85 27 128/90 (103) 96   


 


1/25/20 15:27  76 28     50


 


1/25/20 15:15  101 23 136/106 (116) 98   


 


1/25/20 15:00  75 20 123/86 (98) 94   


 


1/25/20 15:00   20   Endotracheal Tube  50


 


1/25/20 14:45  85 22 127/93 (104) 97   


 


1/25/20 14:30  95 19 118/101 (107) 94   


 


1/25/20 14:15  69 25 120/87 (98) 92   


 


1/25/20 14:00   27   Endotracheal Tube  60


 


1/25/20 14:00  73 27 120/89 (99) 94   


 


1/25/20 13:45  75 27 122/85 (97) 95   


 


1/25/20 13:30  75 27 114/94 (101) 98   


 


1/25/20 13:25    122/91    


 


1/25/20 13:20   28   Mechanical Ventilator 12.0 50


 


1/25/20 13:15  87 17 125/91 (102) 92   


 


1/25/20 13:10  109 22 135/107 (116) 97   


 


1/25/20 13:03  81 28     50


 


1/25/20 13:00  100 23 135/107 (116) 97   


 


1/25/20 13:00   21   Endotracheal Tube  60


 


1/25/20 12:45  74 27 120/85 (97) 96   


 


1/25/20 12:30  73 25 123/87 (99) 96   


 


1/25/20 12:15  93 23 121/91 (101) 99   

















Intake and Output  


 


 1/25/20 1/26/20





 19:00 07:00


 


Intake Total 964.99 ml 490 ml


 


Output Total 935 ml 400 ml


 


Balance 29.99 ml 90 ml


 


  


 


Intake Oral  0 ml


 


IV Total 964.99 ml 430 ml


 


Other  60 ml


 


Output Urine Total 935 ml 400 ml








General Appearance:  no acute distress


HEENT:  normocephalic


Respiratory/Chest:  chest wall non-tender


Cardiovascular:  normal peripheral pulses


Abdomen:  normal bowel sounds


Laboratory Tests


1/25/20 17:30: 


Arterial Blood pH 7.458H, Arterial Blood Partial Pressure CO2 46.0H, Arterial 

Blood Partial Pressure O2 102.3H, Arterial Blood HCO3 31.8H, Arterial Blood 

Oxygen Saturation 97.2, Arterial Blood Base Excess 6.8H, Frandy Test Positive


1/26/20 04:05: 


White Blood Count 11.2H, Red Blood Count 6.20H, Hemoglobin 16.3, Hematocrit 

52.3H, Mean Corpuscular Volume 84, Mean Corpuscular Hemoglobin 26.3L, Mean 

Corpuscular Hemoglobin Concent 31.2L, Red Cell Distribution Width 17.7H, 

Platelet Count 153, Mean Platelet Volume 6.3L, Neutrophils (%) (Auto) , 

Lymphocytes (%) (Auto) , Monocytes (%) (Auto) , Eosinophils (%) (Auto) , 

Basophils (%) (Auto) , Differential Total Cells Counted 100, Neutrophils % (

Manual) 93H, Lymphocytes % (Manual) 5L, Monocytes % (Manual) 2, Eosinophils % (

Manual) 0, Basophils % (Manual) 0, Band Neutrophils 0, Platelet Estimate 

Adequate, Platelet Morphology Normal, Anisocytosis 1+, Sodium Level 144, 

Potassium Level 3.5, Chloride Level 102, Carbon Dioxide Level 37H, Anion Gap 5, 

Blood Urea Nitrogen 23H, Creatinine 0.6, Estimat Glomerular Filtration Rate > 60

, Glucose Level 158H, Uric Acid 7.0, Calcium Level 8.3L, Phosphorus Level 6.0H, 

Magnesium Level 1.7L, Total Bilirubin 1.3H, Direct Bilirubin 0.5H, Aspartate 

Amino Transf (AST/SGOT) 34, Alanine Aminotransferase (ALT/SGPT) 395H, Alkaline 

Phosphatase 86, Troponin I 1.068H, C-Reactive Protein, Quantitative 0.8, Pro-B-

Type Natriuretic Peptide 54356C, Total Protein 5.1L, Albumin 2.5L, Globulin 2.6

, Albumin/Globulin Ratio 1.0


1/26/20 08:25: 


Arterial Blood pH 7.269L, Arterial Blood Partial Pressure CO2 75.9*H, Arterial 

Blood Partial Pressure O2 109.8H, Arterial Blood HCO3 34.0H, Arterial Blood 

Oxygen Saturation 97.0, Arterial Blood Base Excess 3.6H, Frandy Test Positive, 

Troponin I 0.697H





Current Medications








 Medications


  (Trade)  Dose


 Ordered  Sig/German


 Route


 PRN Reason  Start Time


 Stop Time Status Last Admin


Dose Admin


 


 Acetaminophen


  (Tylenol)  650 mg  Q6H  PRN


 ORAL


 Mild Pain/Temp > 100.5  1/23/20 12:00


 2/21/20 17:59   


 


 


 Albuterol/


 Ipratropium


  (Albuterol/


 Ipratropium)  3 ml  Q6HRT  PRN


 HHN


 Shortness of Breath  1/23/20 13:00


 1/27/20 00:59  1/25/20 10:29


 


 


 Aspirin


  (ASA)  81 mg  DAILY


 ORAL


   1/24/20 09:00


 2/21/20 08:59  1/26/20 09:56


 


 


 Ceftriaxone


 Sodium 1 gm/


 Sodium Chloride  55 ml @ 


 110 mls/hr  Q24H


 IVPB


   1/24/20 16:00


 1/30/20 15:59  1/25/20 16:23


 


 


 Dextrose


  (Dextrose 50%)  25 ml  Q30M  PRN


 IV


 Hypoglycemia  1/24/20 07:15


 2/23/20 07:14   


 


 


 Dextrose


  (Dextrose 50%)  50 ml  Q30M  PRN


 IV


 Hypoglycemia  1/24/20 07:15


 2/23/20 07:14   


 


 


 Digoxin


  (Lanoxin)  0.125 mg  ONCE


 IVP


   1/26/20 09:45


 1/26/20 12:00  1/26/20 10:04


 


 


 Fentanyl Citrate


 2500 mcg/Sodium


 Chloride  250 ml @ 0


 mls/hr  Q24H


 IV


   1/25/20 09:00


 2/1/20 08:59  1/26/20 08:06


 


 


 Guaifenesin/


 Dextromethorphan


  (Robitussin DM


 Syrup)  5 ml  Q6H  PRN


 ORAL


 For Cough  1/23/20 12:00


 2/21/20 17:59   


 


 


 Haloperidol


 Lactate


  (Haldol)  5 mg  Q4H  PRN


 IM


 Agitation  1/23/20 13:00


 2/22/20 12:59  1/23/20 16:49


 


 


 Insulin Aspart


  (NovoLOG)    BEFORE MEALS AND  HS


 SUBQ


   1/24/20 21:00


 2/23/20 20:59  1/26/20 05:51


 


 


 Insulin Detemir


  (Levemir)  10 units  DAILY


 SUBQ


   1/24/20 09:00


 2/23/20 08:59  1/26/20 09:55


 


 


 Isosorbide


 Dinitrate


  (Isordil)  10 mg  Q6HR


 ORAL


   1/25/20 11:00


 2/24/20 10:59  1/26/20 05:50


 


 


 Lorazepam


  (Ativan 2mg/ml


 1ml)  2 mg  Q2H  PRN


 IV


 For Anxiety  1/23/20 17:15


 1/30/20 17:14  1/26/20 09:53


 


 


 Magnesium Sulfate  100 ml @ 


 100 mls/hr  Q1H


 IVPB


   1/26/20 09:30


 1/26/20 13:29  1/26/20 11:18


 


 


 Methylprednisolone


 Sodium Succinate


  (Solu-MEDROL)  40 mg  EVERY 8  HOURS


 IVP


   1/26/20 13:00


 2/25/20 12:59   


 


 


 Morphine Sulfate


  (Morphine


 Sulfate)  2 mg  Q2H  PRN


 IVP


 For Pain  1/23/20 17:15


 1/30/20 17:14  1/23/20 18:03


 


 


 Nitroglycerin


  (Ntg)  1 patch  Q24H


 TDERMAL


   1/23/20 13:00


 2/22/20 12:59  1/25/20 13:25


 


 


 Pantoprazole


  (Protonix)  40 mg  EVERY 12  HOURS


 IVP


   1/23/20 21:00


 2/22/20 20:59  1/26/20 09:56


 


 


 Risperidone


  (RisperDAL)  2 mg  QHS


 ORAL


   1/23/20 21:00


 2/21/20 20:59  1/25/20 21:06


 


 


 Trazodone HCl


  (Desyrel)  100 mg  BEDTIME


 ORAL


   1/23/20 21:00


 2/21/20 20:59  1/25/20 21:05


 

















True Duffy MD Jan 26, 2020 12:02

## 2020-01-26 NOTE — NUR
NURSE NOTES:

Received patient and report from Naveen Goddard RN. Patient is sedated RASS score -2. SB with 
PVC on the Heart Monitor, HR 58. Pt's intubated with ET-tube size 8.0m, 26cm at the lip 
line, right side, vent settings:  AC 14, , FiO2 60%, PEEP 5. IV site is right AC 20g 
receiving Fentanyl at 250mcg/hr, Left Upper Arm 20g patent and intact. Hernandez catheter is 
intact and draining dark stuart colored urine. Bed is low and locked with side rails up x3, 
bed alarm on, head of bed elevated, call light within reach. Will continue to monitor.

## 2020-01-26 NOTE — NUR
NURSE NOTES:

New Fentanyl bag replaced, previous bag wasted downstairs with pharmacist Isabella. 15ml 
remaining.

## 2020-01-26 NOTE — NUR
NURSE NOTES:

Notified Dr. Duffy of patient's current status. Pt still in SVT, HR in high 130's. 
Received orders to give Metoprolol 5mg IVP once now.

## 2020-01-26 NOTE — NUR
NURSE NOTES:

SVT, HR in 170's-245 since 0730. Pulse present. patient is diaphoretic, able to open eyes 
and follow simple commands but restless. Paged Dr. la and Dr. Murphy on emergency line 
but no reply. Called Dr. Yung but hes unable to reach Dr. Murphy as well. Paged Dr. La again, received called back at 0812. Received orders for Digoxin .25mg IVP once now 
and Metoprolol 5mg IVP now. Read back given and verified. Pt's HR is 124, sinus tachycardia. 
BP 92/77. 

-------------------------------------------------------------------------------

Addendum: 01/26/20 at 0838 by Nitza Hodge RN

-------------------------------------------------------------------------------

EKG done at 0745, see chart.

## 2020-01-26 NOTE — NUR
RESPIRATORY NOTE:

received pt orally intubated with ETT 8.0 placed 26 cm at the lip. ETT is secured via anchor 
fast with no visible redness or skin irritation. pt in no resp distress. etCO2 attached with 
alarms set. vent alarms are set and audible with ambu bag at bedside. will cont to monitor.

## 2020-01-27 VITALS — SYSTOLIC BLOOD PRESSURE: 111 MMHG | DIASTOLIC BLOOD PRESSURE: 73 MMHG

## 2020-01-27 VITALS — DIASTOLIC BLOOD PRESSURE: 75 MMHG | SYSTOLIC BLOOD PRESSURE: 119 MMHG

## 2020-01-27 VITALS — SYSTOLIC BLOOD PRESSURE: 132 MMHG | DIASTOLIC BLOOD PRESSURE: 99 MMHG

## 2020-01-27 VITALS — DIASTOLIC BLOOD PRESSURE: 100 MMHG | SYSTOLIC BLOOD PRESSURE: 133 MMHG

## 2020-01-27 VITALS — DIASTOLIC BLOOD PRESSURE: 85 MMHG | SYSTOLIC BLOOD PRESSURE: 119 MMHG

## 2020-01-27 VITALS — SYSTOLIC BLOOD PRESSURE: 129 MMHG | DIASTOLIC BLOOD PRESSURE: 89 MMHG

## 2020-01-27 VITALS — SYSTOLIC BLOOD PRESSURE: 115 MMHG | DIASTOLIC BLOOD PRESSURE: 72 MMHG

## 2020-01-27 VITALS — SYSTOLIC BLOOD PRESSURE: 121 MMHG | DIASTOLIC BLOOD PRESSURE: 90 MMHG

## 2020-01-27 VITALS — DIASTOLIC BLOOD PRESSURE: 70 MMHG | SYSTOLIC BLOOD PRESSURE: 109 MMHG

## 2020-01-27 VITALS — SYSTOLIC BLOOD PRESSURE: 104 MMHG | DIASTOLIC BLOOD PRESSURE: 69 MMHG

## 2020-01-27 VITALS — DIASTOLIC BLOOD PRESSURE: 75 MMHG | SYSTOLIC BLOOD PRESSURE: 114 MMHG

## 2020-01-27 VITALS — DIASTOLIC BLOOD PRESSURE: 83 MMHG | SYSTOLIC BLOOD PRESSURE: 121 MMHG

## 2020-01-27 VITALS — SYSTOLIC BLOOD PRESSURE: 133 MMHG | DIASTOLIC BLOOD PRESSURE: 90 MMHG

## 2020-01-27 VITALS — DIASTOLIC BLOOD PRESSURE: 66 MMHG | SYSTOLIC BLOOD PRESSURE: 100 MMHG

## 2020-01-27 VITALS — DIASTOLIC BLOOD PRESSURE: 90 MMHG | SYSTOLIC BLOOD PRESSURE: 128 MMHG

## 2020-01-27 VITALS — DIASTOLIC BLOOD PRESSURE: 75 MMHG | SYSTOLIC BLOOD PRESSURE: 112 MMHG

## 2020-01-27 VITALS — DIASTOLIC BLOOD PRESSURE: 91 MMHG | SYSTOLIC BLOOD PRESSURE: 123 MMHG

## 2020-01-27 VITALS — DIASTOLIC BLOOD PRESSURE: 87 MMHG | SYSTOLIC BLOOD PRESSURE: 130 MMHG

## 2020-01-27 VITALS — SYSTOLIC BLOOD PRESSURE: 119 MMHG | DIASTOLIC BLOOD PRESSURE: 77 MMHG

## 2020-01-27 VITALS — SYSTOLIC BLOOD PRESSURE: 131 MMHG | DIASTOLIC BLOOD PRESSURE: 93 MMHG

## 2020-01-27 VITALS — SYSTOLIC BLOOD PRESSURE: 136 MMHG | DIASTOLIC BLOOD PRESSURE: 97 MMHG

## 2020-01-27 VITALS — DIASTOLIC BLOOD PRESSURE: 86 MMHG | SYSTOLIC BLOOD PRESSURE: 121 MMHG

## 2020-01-27 VITALS — SYSTOLIC BLOOD PRESSURE: 132 MMHG | DIASTOLIC BLOOD PRESSURE: 92 MMHG

## 2020-01-27 VITALS — SYSTOLIC BLOOD PRESSURE: 118 MMHG | DIASTOLIC BLOOD PRESSURE: 82 MMHG

## 2020-01-27 VITALS — DIASTOLIC BLOOD PRESSURE: 75 MMHG | SYSTOLIC BLOOD PRESSURE: 107 MMHG

## 2020-01-27 VITALS — SYSTOLIC BLOOD PRESSURE: 120 MMHG | DIASTOLIC BLOOD PRESSURE: 90 MMHG

## 2020-01-27 VITALS — DIASTOLIC BLOOD PRESSURE: 85 MMHG | SYSTOLIC BLOOD PRESSURE: 125 MMHG

## 2020-01-27 VITALS — DIASTOLIC BLOOD PRESSURE: 63 MMHG | SYSTOLIC BLOOD PRESSURE: 96 MMHG

## 2020-01-27 LAB
ADD MANUAL DIFF: NO
ALBUMIN SERPL-MCNC: 2.2 G/DL (ref 3.4–5)
ALBUMIN/GLOB SERPL: 0.7 {RATIO} (ref 1–2.7)
ALP SERPL-CCNC: 79 U/L (ref 46–116)
ALT SERPL-CCNC: 277 U/L (ref 12–78)
ANION GAP SERPL CALC-SCNC: 1 MMOL/L (ref 5–15)
AST SERPL-CCNC: 23 U/L (ref 15–37)
BILIRUB DIRECT SERPL-MCNC: 0.5 MG/DL (ref 0–0.3)
BILIRUB SERPL-MCNC: 1.2 MG/DL (ref 0.2–1)
BUN SERPL-MCNC: 25 MG/DL (ref 7–18)
CALCIUM SERPL-MCNC: 8 MG/DL (ref 8.5–10.1)
CHLORIDE SERPL-SCNC: 105 MMOL/L (ref 98–107)
CO2 SERPL-SCNC: 36 MMOL/L (ref 21–32)
CREAT SERPL-MCNC: 0.7 MG/DL (ref 0.55–1.3)
ERYTHROCYTE [DISTWIDTH] IN BLOOD BY AUTOMATED COUNT: 17.8 % (ref 11.6–14.8)
GLOBULIN SER-MCNC: 3 G/DL
HCT VFR BLD CALC: 52 % (ref 42–52)
HGB BLD-MCNC: 15.9 G/DL (ref 14.2–18)
MCV RBC AUTO: 85 FL (ref 80–99)
PHOSPHATE SERPL-MCNC: 4.1 MG/DL (ref 2.5–4.9)
PLATELET # BLD: 160 K/UL (ref 150–450)
POTASSIUM SERPL-SCNC: 3.8 MMOL/L (ref 3.5–5.1)
RBC # BLD AUTO: 6.14 M/UL (ref 4.7–6.1)
SODIUM SERPL-SCNC: 142 MMOL/L (ref 136–145)
WBC # BLD AUTO: 10.6 K/UL (ref 4.8–10.8)

## 2020-01-27 RX ADMIN — METHYLPREDNISOLONE SODIUM SUCCINATE SCH MG: 40 INJECTION, POWDER, LYOPHILIZED, FOR SOLUTION INTRAMUSCULAR; INTRAVENOUS at 14:47

## 2020-01-27 RX ADMIN — INSULIN DETEMIR SCH UNITS: 100 INJECTION, SOLUTION SUBCUTANEOUS at 22:14

## 2020-01-27 RX ADMIN — INSULIN ASPART SCH UNITS: 100 INJECTION, SOLUTION INTRAVENOUS; SUBCUTANEOUS at 21:00

## 2020-01-27 RX ADMIN — SODIUM CHLORIDE SCH MLS/HR: 0.9 INJECTION INTRAVENOUS at 16:35

## 2020-01-27 RX ADMIN — METHYLPREDNISOLONE SODIUM SUCCINATE SCH MG: 40 INJECTION, POWDER, LYOPHILIZED, FOR SOLUTION INTRAMUSCULAR; INTRAVENOUS at 05:48

## 2020-01-27 RX ADMIN — PANTOPRAZOLE SODIUM SCH MG: 40 INJECTION, POWDER, FOR SOLUTION INTRAVENOUS at 10:00

## 2020-01-27 RX ADMIN — INSULIN DETEMIR SCH UNITS: 100 INJECTION, SOLUTION SUBCUTANEOUS at 10:02

## 2020-01-27 RX ADMIN — INSULIN ASPART SCH UNITS: 100 INJECTION, SOLUTION INTRAVENOUS; SUBCUTANEOUS at 16:30

## 2020-01-27 RX ADMIN — NITROGLYCERIN SCH INCH: 20 OINTMENT TOPICAL at 18:09

## 2020-01-27 RX ADMIN — ASPIRIN 81 MG SCH MG: 81 TABLET ORAL at 10:00

## 2020-01-27 RX ADMIN — TRAZODONE HYDROCHLORIDE SCH MG: 100 TABLET ORAL at 21:23

## 2020-01-27 RX ADMIN — INSULIN ASPART SCH UNITS: 100 INJECTION, SOLUTION INTRAVENOUS; SUBCUTANEOUS at 11:30

## 2020-01-27 RX ADMIN — PANTOPRAZOLE SODIUM SCH MG: 40 INJECTION, POWDER, FOR SOLUTION INTRAVENOUS at 21:22

## 2020-01-27 RX ADMIN — METHYLPREDNISOLONE SODIUM SUCCINATE SCH MG: 40 INJECTION, POWDER, LYOPHILIZED, FOR SOLUTION INTRAMUSCULAR; INTRAVENOUS at 22:13

## 2020-01-27 RX ADMIN — INSULIN ASPART SCH UNITS: 100 INJECTION, SOLUTION INTRAVENOUS; SUBCUTANEOUS at 05:50

## 2020-01-27 NOTE — NUR
NURSE NOTES:

Received patient from Bach RN. Patient is awake, alert and oriented x3. Sinus Rhythm on the 
heart monitor, HR 77. Receiving oxygen via ET tube size 8.0, 29cm at the lip line. Vent 
settings: AC 14, , FiO2 40%, PEEP 5. IV site is Right AC 20g and Left Upper Arm 20g 
both patent and intact. OGT is intact and receiving Vital AF 1.2 at 30cc/hr. Hernandez Catheter 
is intact and draining. Bed is locked, placed in lowest position, side rails up x3, head of 
bed elevated, bed alarm on, call light within reach. Will continue to monitor.

## 2020-01-27 NOTE — DIAGNOSTIC IMAGING REPORT
Indication: Dyspnea

 

Comparison:  1/26/2020

 

A single view chest radiograph was obtained.

 

Findings:

 

Mild pulmonary vascular congestion suspected though findings appear relatively

similar on the prior occasion. The heart is enlarged. Tubes and lines are stable.

 

IMPRESSION:

 

No change from the prior exam

## 2020-01-27 NOTE — NUR
HAND-OFF: 

Report given to MILADIS Garcia.

-------------------------------------------------------------------------------

Addendum: 01/27/20 at 0720 by ROBINA GARDNER RN RN

-------------------------------------------------------------------------------

change to MILADIS Gasca.

## 2020-01-27 NOTE — NUR
NURSE NOTES:

Pt's resting in bed, comfortably, afebrile, in no acute distress. Fentanyl is titrating 
down, currently at 150mcg/hr. Will continue to monitor.

## 2020-01-27 NOTE — PULMONOLOGY PROGRESS NOTE
Assessment/Plan


Assessment/Plan


IMPRESSION:


1. Hypoglycemia. Seen by endocrine; now resolved


2. Metabolic encephalopathy.


3. Chronic obstructive pulmonary disease with exacerbation. Now intubated 


4. Acute myocardial infarction.


5. Hyponatremia. Corrected


6.Acute on chronic congestive heart failure, possibly systolic and


diastolic.





PLAN:


1. Continue vent; will begin weaning


2. Serial troponin.


3. Anti-platelet therapy.


4. Off Fentanyl


5. Current present care


6. DVT prophylaxis.


7. Empiric antimicrobials.








GI, endocrine and cardiology consults appreciated


Discussed psych medication regimen with Dr Harris














  ______________________________________________


True Duffy M.D.





Subjective


Interval Events:  Off fentanyl; remains on vent


Constitutional:  Reports: no symptoms


HEENT:  Repors: no symptoms


Respiratory:  Reports: no symptoms


Cardiovascular:  Reports: no symptoms


Gastrointestinal/Abdominal:  Reports: no symptoms


Allergies:  


Coded Allergies:  


     No Known Allergies (Unverified , 1/16/18)





Objective





Last 24 Hour Vital Signs








  Date Time  Temp Pulse Resp B/P (MAP) Pulse Ox O2 Delivery O2 Flow Rate FiO2


 


1/27/20 09:40  63 10     40





        40


 


1/27/20 09:28  66 18     60


 


1/27/20 08:20        60


 


1/27/20 08:00 98.2 64 13 121/83 (96) 94   


 


1/27/20 08:00      Mechanical Ventilator  


 


1/27/20 08:00        60


 


1/27/20 07:35  76      


 


1/27/20 07:00  77 14 119/85 (96) 95   


 


1/27/20 06:53  84 18     40





        40


 


1/27/20 06:30  67 14     


 


1/27/20 06:00   14   Mechanical Ventilator  


 


1/27/20 06:00  69 14 120/90 (100) 94   


 


1/27/20 05:49    112/75    


 


1/27/20 05:19  89 16     60


 


1/27/20 05:00  73 14 114/75 (88) 92   


 


1/27/20 05:00   14   Mechanical Ventilator  


 


1/27/20 04:30  83 13 119/77 (91) 93   


 


1/27/20 04:00      Mechanical Ventilator  


 


1/27/20 04:00        60


 


1/27/20 04:00  68      


 


1/27/20 04:00   14   Mechanical Ventilator  


 


1/27/20 04:00  68 15 115/72 (86) 94   


 


1/27/20 03:30  75 20     60


 


1/27/20 03:00   15   Mechanical Ventilator  


 


1/27/20 03:00  66 14 112/75 (87) 94   


 


1/27/20 02:30  65 11 109/70 (83) 95   


 


1/27/20 02:01  69      


 


1/27/20 02:00   14   Mechanical Ventilator  


 


1/27/20 02:00  66 14 107/75 (86) 95   


 


1/27/20 01:30  67 6 104/69 (81) 93   


 


1/27/20 01:02  58 14  93 Mechanical Ventilator  60


 


1/27/20 01:00 98.7 75 14 111/73 (86) 97   


 


1/27/20 01:00   14   Mechanical Ventilator  


 


1/27/20 00:49  61 14     60


 


1/27/20 00:30  59 14 96/63 (74) 93   


 


1/27/20 00:19    100/66    


 


1/27/20 00:00      Mechanical Ventilator  


 


1/27/20 00:00  57 14 100/66 (77) 95   


 


1/27/20 00:00        60


 


1/27/20 00:00  70      


 


1/27/20 00:00   14   Mechanical Ventilator  


 


1/26/20 23:30  57 14 99/67 (78) 95   


 


1/26/20 23:10  57 14     60


 


1/26/20 23:00  58 14 93/65 (74) 94   


 


1/26/20 23:00   14   Mechanical Ventilator  


 


1/26/20 22:30  59 14 102/67 (79) 94   


 


1/26/20 22:00   14   Mechanical Ventilator  


 


1/26/20 22:00  62 14 99/65 (76) 93   


 


1/26/20 21:30  66 14     60


 


1/26/20 21:30  66 14 98/65 (76) 95   


 


1/26/20 21:00   14   Mechanical Ventilator  


 


1/26/20 21:00  70 5 105/71 (82) 95   


 


1/26/20 20:30  66 11 107/72 (84) 95   


 


1/26/20 20:00   16   Mechanical Ventilator  


 


1/26/20 20:00  64 14 104/69 (81) 96   


 


1/26/20 20:00  56      


 


1/26/20 20:00      Mechanical Ventilator  


 


1/26/20 20:00        60


 


1/26/20 19:30 98.7 62 14 106/72 (83) 96   


 


1/26/20 19:14  57 14     60


 


1/26/20 19:00  55 14 99/68 (78) 95   


 


1/26/20 19:00   16   Mechanical Ventilator  


 


1/26/20 18:00  56 14 100/66 (77) 94   


 


1/26/20 18:00   14   Mechanical Ventilator  60


 


1/26/20 17:35    98/69    


 


1/26/20 17:15 98.6       


 


1/26/20 17:00  57 14 104/62 (76) 93   


 


1/26/20 17:00   16   Mechanical Ventilator  60


 


1/26/20 16:45   14   Mechanical Ventilator 12.0 60


 


1/26/20 16:43  57 14     60


 


1/26/20 16:00 98.6 67 14 111/72 (85) 93   


 


1/26/20 16:00  59      


 


1/26/20 16:00      Mechanical Ventilator  


 


1/26/20 16:00        60


 


1/26/20 16:00   16   Mechanical Ventilator  60


 


1/26/20 15:00   16   Mechanical Ventilator  60


 


1/26/20 15:00  64 14 106/73 (84) 99   


 


1/26/20 14:49  71 14     60


 


1/26/20 14:30  58 15 105/72 (83) 99   


 


1/26/20 14:00  59 14 102/70 (81) 99   


 


1/26/20 14:00   16   Mechanical Ventilator  60


 


1/26/20 13:00   16   Mechanical Ventilator  60


 


1/26/20 13:00  60 14 99/69 (79) 99   


 


1/26/20 12:48    104/65    


 


1/26/20 12:46    104/65    


 


1/26/20 12:43  63 14     60


 


1/26/20 12:30  63 14 104/65 (78) 99   


 


1/26/20 12:00   16   Mechanical Ventilator  60


 


1/26/20 12:00  59      


 


1/26/20 12:00      Mechanical Ventilator  


 


1/26/20 12:00        60


 


1/26/20 12:00 98.6 58 14 95/67 (76) 99   


 


1/26/20 11:30  66 14 99/65 (76) 98   


 


1/26/20 11:00  70 14 94/64 (74) 96   


 


1/26/20 11:00   20   Mechanical Ventilator  60


 


1/26/20 10:55  70  89/62    


 


1/26/20 10:38  70 14     60

















Intake and Output  


 


 1/26/20 1/27/20





 19:00 07:00


 


Intake Total 658 ml 590 ml


 


Output Total 360 ml 410 ml


 


Balance 298 ml 180 ml


 


  


 


Intake Oral 0 ml 


 


Free Water 120 ml 60 ml


 


IV Total 418 ml 170 ml


 


Tube Feeding 120 ml 360 ml


 


Output Urine Total 360 ml 410 ml








General Appearance:  no acute distress


HEENT:  normocephalic


Respiratory/Chest:  chest wall non-tender, decreased breath sounds


Cardiovascular:  normal peripheral pulses, normal rate


Abdomen:  normal bowel sounds, soft, non tender


Laboratory Tests


1/27/20 05:20: 


White Blood Count 10.6, Red Blood Count 6.14H, Hemoglobin 15.9, Hematocrit 52.0

, Mean Corpuscular Volume 85, Mean Corpuscular Hemoglobin 26.0L, Mean 

Corpuscular Hemoglobin Concent 30.6L, Red Cell Distribution Width 17.8H, 

Platelet Count 160, Mean Platelet Volume 7.3, Neutrophils (%) (Auto) , 

Lymphocytes (%) (Auto) , Monocytes (%) (Auto) , Eosinophils (%) (Auto) , 

Basophils (%) (Auto) , Sodium Level 142, Potassium Level 3.8, Chloride Level 105

, Carbon Dioxide Level 36H, Anion Gap 1L, Blood Urea Nitrogen 25H, Creatinine 

0.7, Estimat Glomerular Filtration Rate > 60, Glucose Level 153H, Uric Acid 7.2

, Calcium Level 8.0L, Phosphorus Level 4.1, Magnesium Level 1.9, Total 

Bilirubin 1.2H, Direct Bilirubin 0.5H, Aspartate Amino Transf (AST/SGOT) 23, 

Alanine Aminotransferase (ALT/SGPT) 277H, Alkaline Phosphatase 79, Troponin I 

0.820H, C-Reactive Protein, Quantitative 0.5, Pro-B-Type Natriuretic Peptide 

6019H, Total Protein 5.2L, Albumin 2.2L, Globulin 3.0, Albumin/Globulin Ratio 

0.7L


1/27/20 09:14: 


Arterial Blood pH 7.418, Arterial Blood Partial Pressure CO2 60.1*H, Arterial 

Blood Partial Pressure O2 115.6H, Arterial Blood HCO3 37.9H, Arterial Blood 

Oxygen Saturation 98.1, Arterial Blood Base Excess 10.6*H, Frandy Test Positive





Current Medications








 Medications


  (Trade)  Dose


 Ordered  Sig/German


 Route


 PRN Reason  Start Time


 Stop Time Status Last Admin


Dose Admin


 


 Acetaminophen


  (Tylenol)  650 mg  Q6H  PRN


 ORAL


 Mild Pain/Temp > 100.5  1/23/20 12:00


 2/21/20 17:59   


 


 


 Aspirin


  (ASA)  81 mg  DAILY


 ORAL


   1/24/20 09:00


 2/21/20 08:59  1/27/20 10:00


 


 


 Ceftriaxone


 Sodium 1 gm/


 Sodium Chloride  55 ml @ 


 110 mls/hr  Q24H


 IVPB


   1/24/20 16:00


 1/30/20 15:59  1/26/20 16:18


 


 


 Dextrose


  (Dextrose 50%)  25 ml  Q30M  PRN


 IV


 Hypoglycemia  1/24/20 07:15


 2/23/20 07:14   


 


 


 Dextrose


  (Dextrose 50%)  50 ml  Q30M  PRN


 IV


 Hypoglycemia  1/24/20 07:15


 2/23/20 07:14   


 


 


 Fentanyl Citrate


 2500 mcg/Sodium


 Chloride  250 ml @ 0


 mls/hr  Q24H


 IV


   1/25/20 09:00


 2/1/20 08:59  1/26/20 16:45


 


 


 Guaifenesin/


 Dextromethorphan


  (Robitussin DM


 Syrup)  5 ml  Q6H  PRN


 ORAL


 For Cough  1/23/20 12:00


 2/21/20 17:59   


 


 


 Haloperidol


 Lactate


  (Haldol)  5 mg  Q4H  PRN


 IM


 Agitation  1/23/20 13:00


 2/22/20 12:59  1/23/20 16:49


 


 


 Insulin Aspart


  (NovoLOG)    BEFORE MEALS AND  HS


 SUBQ


   1/24/20 21:00


 2/23/20 20:59  1/27/20 05:50


 


 


 Insulin Detemir


  (Levemir)  10 units  Q12HR


 SUBQ


   1/26/20 21:00


 2/25/20 20:59  1/27/20 10:02


 


 


 Iohexol


  (Omnipaque)  100 mg  NOW  PRN


 INJ


 Radiology Procedure  1/26/20 12:15


 1/28/20 12:07   


 


 


 Lorazepam


  (Ativan 2mg/ml


 1ml)  2 mg  Q2H  PRN


 IV


 For Anxiety  1/23/20 17:15


 1/30/20 17:14  1/26/20 15:52


 


 


 Methylprednisolone


 Sodium Succinate


  (Solu-MEDROL)  40 mg  EVERY 8  HOURS


 IVP


   1/26/20 13:00


 2/25/20 12:59  1/27/20 05:48


 


 


 Morphine Sulfate


  (Morphine


 Sulfate)  2 mg  Q2H  PRN


 IVP


 For Pain  1/23/20 17:15


 1/30/20 17:14  1/23/20 18:03


 


 


 Nitroglycerin


  (Nitro-Bid)  1 inch  TID@0600,1200,1800


 TOPIC


   1/27/20 06:00


 2/26/20 05:59  1/27/20 05:49


 


 


 Pantoprazole


  (Protonix)  40 mg  EVERY 12  HOURS


 IVP


   1/23/20 21:00


 2/22/20 20:59  1/27/20 10:00


 


 


 Risperidone


  (RisperDAL)  2 mg  QHS


 ORAL


   1/23/20 21:00


 2/21/20 20:59  1/26/20 20:48


 


 


 Trazodone HCl


  (Desyrel)  100 mg  BEDTIME


 ORAL


   1/23/20 21:00


 2/21/20 20:59  1/26/20 20:47


 

















True Duffy MD Jan 27, 2020 10:21

## 2020-01-27 NOTE — DIAGNOSTIC IMAGING REPORT
Indication: Dyspnea

 

Comparison:  1/25/2020

 

A single view chest radiograph was obtained.

 

Findings:

 

Pulmonary vascular congestion demonstrated with cardiomegaly. Tubes and lines are

stable.

 

IMPRESSION:

 

CHF. No change

## 2020-01-27 NOTE — PROGRESS NOTE
DATE:  01/26/2020

CARDIOLOGY PROGRESS NOTE



SUBJECTIVE:  The patient remains orally intubated.  Mechanically

ventilated.  He has had episodes of rapid atrial arrhythmias today.  IV

metoprolol was effective as well as digitalis intravenously.



OBJECTIVE:

VITAL SIGNS:  Blood pressure 98/65, pulse 66, respirations 14.

LUNGS:  Bilateral breath sounds.  Rhonchi.

CARDIAC:  Regular rhythm and rate.  Normal S1, S2.

ABDOMEN:  Soft.

EXTREMITIES:  Trace edema.  Moderate ascites.



LABORATORY DATA:  Troponin 0.697.  Natriuretic peptide 11,000.  BUN 23,

creatinine 0.6, potassium 3.5.



IMPRESSION:

1. Paroxysmal supraventricular tachyarrhythmias.

2. Acute myocardial infarction.

3. Hypomagnesemia.

4. Hypokalemia.

5. Acute on chronic right-sided congestive heart failure.

6. Severe protein-calorie malnutrition.



PLAN:

1. Topical nitrates.

2. Replace electrolytes.

3. Antiplatelet therapy with aspirin.

4. Avoid long-acting beta-blockers in view of a paroxysmal

bronchospasm.

5. Continue digoxin.









  ______________________________________________

  Franklin Murphy M.D.





DR:  TANG

D:  01/27/2020 01:18

T:  01/27/2020 02:33

JOB#:  [id number]

CC:

## 2020-01-27 NOTE — NUR
RESPIRATORY NOTES:

Successfully extubated patient at 1402 with Luiz RN bedside. No strider noted. Placed 
patient on High Flow Nasal Cannula 40LPM 45% FIO2. Skin intact. Tolerating well.

## 2020-01-27 NOTE — NUR
RESPIRATORY NOTES:

Weaning started at 0653. Placed Patient on PS +8 PEEP +5 FIO2 40%. Patient tolerating well. 
Will continue to monitor. RSBI 21, RR 12, , Ve 9, NIF -35.

## 2020-01-27 NOTE — NUR
SI;     RESP FAILURE ETT/VENT SUPPORT

T. 98.2 HR 64 RR 13 B/P 121/83

CPAP FIO2 40% PEEP 5 PS 10







IS:    TRAZODONE

WEANING 

********ICU STATUS******

## 2020-01-27 NOTE — NUR
NURSE NOTES:

Pt's resting in bed, comfortably, afebrile, in no acute distress. Fentanyl is titrating 
down, currently at 100mcg/hr. Will continue to monitor.

## 2020-01-27 NOTE — GENERAL PROGRESS NOTE
Assessment/Plan


Problem List:  


(1) Diabetes mellitus


ICD Codes:  E11.9 - Type 2 diabetes mellitus without complications


SNOMED:  70960169


(2) Hypoglycemia


ICD Codes:  E16.2 - Hypoglycemia, unspecified


SNOMED:  577581074


(3) Schizophrenia


ICD Codes:  F20.9 - Schizophrenia, unspecified


SNOMED:  10395909


Qualifiers:  


   Qualified Codes:  F20.9 - Schizophrenia, unspecified


(4) Elevated troponin


ICD Codes:  R79.89 - Other specified abnormal findings of blood chemistry


SNOMED:  466183977, 171457506, 848541335


(5) COPD exacerbation


ICD Codes:  J44.1 - Chronic obstructive pulmonary disease with (acute) 

exacerbation


SNOMED:  437339459


(6) Abnormal LFTs


ICD Codes:  R94.5 - Abnormal results of liver function studies


SNOMED:  253726215


Status:  unchanged


Assessment/Plan:


Levemir 10 units bid - half dose while NPO 


continue NISS 


seems to be ready to start diet





Subjective


Allergies:  


Coded Allergies:  


     No Known Allergies (Unverified , 1/16/18)


All Systems:  reviewed and negative except above


Subjective


events noted 


interval notes reviewed 


extubated today 


OGT dislodged 


he is NPO and he is hungry 


denies SOB 











Item Value  Date Time


 


Bedside Blood Glucose 139 mg/dl H 1/27/20 1630


 


Bedside Blood Glucose 117 mg/dl 1/27/20 1130


 


Bedside Blood Glucose 104 mg/dl 1/27/20 1002


 


Bedside Blood Glucose 146 mg/dl H 1/27/20 0630


 


Bedside Blood Glucose 162 mg/dl H 1/26/20 2113











Objective





Last 24 Hour Vital Signs








  Date Time  Temp Pulse Resp B/P (MAP) Pulse Ox O2 Delivery O2 Flow Rate FiO2


 


1/27/20 18:09    129/95    


 


1/27/20 18:00  76 17 133/90 (104) 96   


 


1/27/20 17:00  79 15 136/97 (110) 95   


 


1/27/20 16:00       40.0 45


 


1/27/20 16:00  70      


 


1/27/20 16:00      Mechanical Ventilator  


 


1/27/20 16:00  71 14 125/85 (98) 93   


 


1/27/20 15:00  79  121/90 (100) 91   


 


1/27/20 14:18     95 High Flow 40.0 45


 


1/27/20 14:17      Nasal Cannula 40.0 45


 


1/27/20 14:00  68 14 133/100 (111) 96   


 


1/27/20 13:00  69 15 131/93 (106) 96   


 


1/27/20 12:57  68 14     40


 


1/27/20 12:06  98      


 


1/27/20 12:00      Mechanical Ventilator  


 


1/27/20 12:00        60


 


1/27/20 12:00 98.0 72 11 121/86 (98) 91   


 


1/27/20 11:20  74 12     40





        40


 


1/27/20 11:00  100 19 132/99 (110) 97   


 


1/27/20 10:00  80 23 132/92 (105) 94   


 


1/27/20 09:40  63 10     40





        40


 


1/27/20 09:28  66 18     60


 


1/27/20 09:00  60 12 123/91 (102) 99   


 


1/27/20 08:20        60


 


1/27/20 08:00 98.2 64 13 121/83 (96) 94   


 


1/27/20 08:00      Mechanical Ventilator  


 


1/27/20 08:00        60


 


1/27/20 07:35  76      


 


1/27/20 07:00  77 14 119/85 (96) 95   


 


1/27/20 06:53  84 18     40





        40


 


1/27/20 06:30  67 14     


 


1/27/20 06:00   14   Mechanical Ventilator  


 


1/27/20 06:00  69 14 120/90 (100) 94   


 


1/27/20 05:49    112/75    


 


1/27/20 05:19  89 16     60


 


1/27/20 05:00  73 14 114/75 (88) 92   


 


1/27/20 05:00   14   Mechanical Ventilator  


 


1/27/20 04:30  83 13 119/77 (91) 93   


 


1/27/20 04:00      Mechanical Ventilator  


 


1/27/20 04:00        60


 


1/27/20 04:00  68      


 


1/27/20 04:00   14   Mechanical Ventilator  


 


1/27/20 04:00  68 15 115/72 (86) 94   


 


1/27/20 03:30  75 20     60


 


1/27/20 03:00   15   Mechanical Ventilator  


 


1/27/20 03:00  66 14 112/75 (87) 94   


 


1/27/20 02:30  65 11 109/70 (83) 95   


 


1/27/20 02:01  69      


 


1/27/20 02:00   14   Mechanical Ventilator  


 


1/27/20 02:00  66 14 107/75 (86) 95   


 


1/27/20 01:30  67 6 104/69 (81) 93   


 


1/27/20 01:02  58 14  93 Mechanical Ventilator  60


 


1/27/20 01:00 98.7 75 14 111/73 (86) 97   


 


1/27/20 01:00   14   Mechanical Ventilator  


 


1/27/20 00:49  61 14     60


 


1/27/20 00:30  59 14 96/63 (74) 93   


 


1/27/20 00:19    100/66    


 


1/27/20 00:00      Mechanical Ventilator  


 


1/27/20 00:00  57 14 100/66 (77) 95   


 


1/27/20 00:00        60


 


1/27/20 00:00  70      


 


1/27/20 00:00   14   Mechanical Ventilator  


 


1/26/20 23:30  57 14 99/67 (78) 95   


 


1/26/20 23:10  57 14     60


 


1/26/20 23:00  58 14 93/65 (74) 94   


 


1/26/20 23:00   14   Mechanical Ventilator  


 


1/26/20 22:30  59 14 102/67 (79) 94   


 


1/26/20 22:00   14   Mechanical Ventilator  


 


1/26/20 22:00  62 14 99/65 (76) 93   


 


1/26/20 21:30  66 14     60


 


1/26/20 21:30  66 14 98/65 (76) 95   


 


1/26/20 21:00   14   Mechanical Ventilator  


 


1/26/20 21:00  70 5 105/71 (82) 95   


 


1/26/20 20:30  66 11 107/72 (84) 95   


 


1/26/20 20:00   16   Mechanical Ventilator  


 


1/26/20 20:00  64 14 104/69 (81) 96   


 


1/26/20 20:00  56      


 


1/26/20 20:00      Mechanical Ventilator  


 


1/26/20 20:00        60


 


1/26/20 19:30 98.7 62 14 106/72 (83) 96   


 


1/26/20 19:14  57 14     60

















Intake and Output  


 


 1/26/20 1/27/20





 18:59 06:59


 


Intake Total 578 ml 670 ml


 


Output Total 360 ml 410 ml


 


Balance 218 ml 260 ml


 


  


 


Intake Oral 0 ml 


 


Free Water 120 ml 60 ml


 


IV Total 368 ml 250 ml


 


Tube Feeding 90 ml 360 ml


 


Output Urine Total 360 ml 410 ml








Laboratory Tests


1/27/20 05:20: 


White Blood Count 10.6, Red Blood Count 6.14H, Hemoglobin 15.9, Hematocrit 52.0

, Mean Corpuscular Volume 85, Mean Corpuscular Hemoglobin 26.0L, Mean 

Corpuscular Hemoglobin Concent 30.6L, Red Cell Distribution Width 17.8H, 

Platelet Count 160, Mean Platelet Volume 7.3, Neutrophils (%) (Auto) , 

Lymphocytes (%) (Auto) , Monocytes (%) (Auto) , Eosinophils (%) (Auto) , 

Basophils (%) (Auto) , Sodium Level 142, Potassium Level 3.8, Chloride Level 105

, Carbon Dioxide Level 36H, Anion Gap 1L, Blood Urea Nitrogen 25H, Creatinine 

0.7, Estimat Glomerular Filtration Rate > 60, Glucose Level 153H, Uric Acid 7.2

, Calcium Level 8.0L, Phosphorus Level 4.1, Magnesium Level 1.9, Total 

Bilirubin 1.2H, Direct Bilirubin 0.5H, Aspartate Amino Transf (AST/SGOT) 23, 

Alanine Aminotransferase (ALT/SGPT) 277H, Alkaline Phosphatase 79, Troponin I 

0.820H, C-Reactive Protein, Quantitative 0.5, Pro-B-Type Natriuretic Peptide 

6019H, Total Protein 5.2L, Albumin 2.2L, Globulin 3.0, Albumin/Globulin Ratio 

0.7L


1/27/20 09:14: 


Arterial Blood pH 7.418, Arterial Blood Partial Pressure CO2 60.1*H, Arterial 

Blood Partial Pressure O2 115.6H, Arterial Blood HCO3 37.9H, Arterial Blood 

Oxygen Saturation 98.1, Arterial Blood Base Excess 10.6*H, Frandy Test Positive


1/27/20 12:40: 


Arterial Blood pH 7.370, Arterial Blood Partial Pressure CO2 63.7*H, Arterial 

Blood Partial Pressure O2 72.6L, Arterial Blood HCO3 36.0H, Arterial Blood 

Oxygen Saturation 93.6L, Arterial Blood Base Excess 8.0H, Frandy Test Positive


Height (Feet):  6


Height (Inches):  3.00


Weight (Pounds):  240


General Appearance:  no apparent distress


Neck:  normal alignment


Cardiovascular:  normal rate


Respiratory/Chest:  decreased breath sounds


Abdomen:  normal bowel sounds


Pelvis:  normal external exam


Edema:  1+ Arm (L), 1+ Arm (R), 1+ Leg (L), 1+ Leg (R), 1+ Pedal (L), 1+ Pedal (

R), 1+ Generalized


Objective





Current Medications








 Medications


  (Trade)  Dose


 Ordered  Sig/German


 Route


 PRN Reason  Start Time


 Stop Time Status Last Admin


Dose Admin


 


 Acetaminophen


  (Tylenol)  650 mg  Q6H  PRN


 ORAL


 Mild Pain/Temp > 100.5  1/27/20 13:30


 2/21/20 13:29   


 


 


 Aspirin


  (ASA)  81 mg  DAILY


 ORAL


   1/28/20 09:00


 2/21/20 08:59   


 


 


 Ceftriaxone


 Sodium 1 gm/


 Sodium Chloride  55 ml @ 


 110 mls/hr  Q24H


 IVPB


   1/27/20 16:00


 1/30/20 15:59  1/27/20 16:35


 


 


 Dextrose


  (Dextrose 50%)  25 ml  Q30M  PRN


 IV


 Hypoglycemia  1/27/20 12:45


 2/23/20 07:14   


 


 


 Dextrose


  (Dextrose 50%)  50 ml  Q30M  PRN


 IV


 Hypoglycemia  1/27/20 12:45


 2/23/20 07:14   


 


 


 Fentanyl Citrate


 2500 mcg/Sodium


 Chloride  250 ml @ 0


 mls/hr  Q24H


 IV


   1/27/20 13:30


 2/3/20 13:29   


 


 


 Guaifenesin/


 Dextromethorphan


  (Robitussin DM


 Syrup)  5 ml  Q6H  PRN


 ORAL


 For Cough  1/27/20 13:30


 2/21/20 13:29   


 


 


 Haloperidol


 Lactate


  (Haldol)  5 mg  Q4H  PRN


 IM


 Agitation  1/27/20 13:00


 2/22/20 12:59   


 


 


 Insulin Aspart


  (NovoLOG)    BEFORE MEALS AND  HS


 SUBQ


   1/27/20 16:30


 2/23/20 20:59   


 


 


 Insulin Detemir


  (Levemir)  10 units  Q12HR


 SUBQ


   1/27/20 21:00


 2/25/20 20:59   


 


 


 Iohexol


  (Omnipaque)  100 mg  NOW  PRN


 INJ


 Radiology Procedure  1/28/20 12:15


 1/31/20 12:07   


 


 


 Lorazepam


  (Ativan 2mg/ml


 1ml)  2 mg  Q2H  PRN


 IV


 For Anxiety  1/27/20 13:15


 1/30/20 17:14   


 


 


 Methylprednisolone


 Sodium Succinate


  (Solu-MEDROL)  40 mg  EVERY 8  HOURS


 IVP


   1/27/20 14:00


 2/25/20 12:59  1/27/20 14:47


 


 


 Morphine Sulfate


  (Morphine


 Sulfate)  2 mg  Q2H  PRN


 IVP


 For Pain  1/27/20 13:15


 1/30/20 17:14   


 


 


 Nitroglycerin


  (Nitro-Bid)  1 inch  TID@0600,1200,1800


 TOPIC


   1/27/20 18:00


 2/26/20 05:59  1/27/20 18:09


 


 


 Pantoprazole


  (Protonix)  40 mg  EVERY 12  HOURS


 IVP


   1/27/20 21:00


 2/22/20 20:59   


 


 


 Risperidone


  (RisperDAL)  2 mg  QHS


 ORAL


   1/27/20 21:00


 2/21/20 20:59   


 


 


 Trazodone HCl


  (Desyrel)  100 mg  BEDTIME


 ORAL


   1/27/20 21:00


 2/21/20 20:59   


 

















Nathen Joiner MD Jan 27, 2020 19:15

## 2020-01-27 NOTE — NUR
RESPIRATORY NOTES:

Weaning started again per Dr. Duffy at 0940. Placed on CPAP PS +10 PEEP +5 FIO2 40%. Will 
continue to closely monitor.

## 2020-01-27 NOTE — NUR
NURSE NOTES:

Pt's resting in bed, watching TV, alert and verbally responsive, VS stable. Afebrile, in no 
acute distress. Will continue to monitor.

## 2020-01-27 NOTE — NUR
RESPIRATORY NOTES:

Weaning stopped. Patients oxygen saturations dropped to 85% and remained 86% - 88% for about 
15 minutes. Placed back onto ACVC settings. MILADIS Dee aware.

## 2020-01-27 NOTE — NUR
RESPIRATORY NOTES:

Received Patient on ACVC settings RR 14, , FIO2 60%, PEEP +5. Patient intubated with a 
8.0 ETT at 26cm at the lip, secured with anchorfast. Suctioned large amount of thick tan 
secretions through ETT and large amount of clear secretions thorough mouth Q2 and PRN. 
Patient alert and aware, however confused. Alarms are on and audible. Vent plugged into red 
outlet. Will continue to monitor Patient throughout the day.

## 2020-01-27 NOTE — PROGRESS NOTE
DATE:  01/25/2020

CARDIOLOGY PROGRESS NOTE



Late entry for January 25, 2020.



SUBJECTIVE:  The patient was seen and evaluated in the intensive care unit

and status reviewed with staff.  The patient has had episodes of

tachyarrhythmias and an elevated troponin level.  He remains orally

intubated and mechanically ventilated.  Blood pressure is labile _____

mood.



PHYSICAL EXAMINATION:

VITAL SIGNS:  Blood pressure 120/87, heart rate 69, respiratory rate 25,

and afebrile.

LUNGS:  Diminished breath sounds with scattered rhonchi.

CARDIAC:  Regular rhythm and rate.  Normal S1 and S2.

ABDOMEN:  Soft.

EXTREMITIES:  Trace edema.



LABORATORY DATA:  White count 9.7 and hemoglobin 16.3.  ABG - 7.46, 46, and

102.  Magnesium 1.6, sodium 141, potassium 3.4, bicarbonate 35, BUN 22,

and creatinine 0.8.  Troponin level now 1.453.



IMPRESSION:

1. Acute myocardial infarction.

2. Acute respiratory failure.

3. COPD exacerbation.

4. Hypomagnesemia.

5. Hypokalemia.

6. Paroxysmal atrial arrhythmias.

7. Paroxysmal bronchospasm.

8. Cirrhosis.

9. Right heart failure.



PLAN:

1. IV magnesium.

2. Potassium per NG tube.

3. Antimicrobials.

4. Ventilator support.

5. Avoid beta-blockers due to severity of obstructive lung disease.

6. Neuroleptic therapy for agitation.

7. Nitrates.

8. Anti-platelet therapy.









  ______________________________________________

  Franklin Murphy M.D.





DR:  LILY

D:  01/27/2020 01:13

T:  01/27/2020 02:39

JOB#:  [id number]

CC:

## 2020-01-27 NOTE — NUR
NURSE NOTES:

Pt's resting in bed, Fentanyl on hold for weaning, pt's calm and alert. VS stable. Will 
continue to monitor.

## 2020-01-27 NOTE — NUR
NURSE NOTES:

Received patient and report from IMLADIS Gasca. Extubated in the AM. Patient is awake, AOx3, 
verbally responsive.  SR with PVC on the heart monitor, HR 68. Afebrile. VS stable. O2 sat 
99% on high flow NC 40L, fiO2 45%, goal to keep O2 sat above 92%. Right AC 20G 
saline-locked, left forearm 20G patent and intact TKO. Hernandez catheter is intact and draining 
dark stuart colored urine. Bed is low and locked with side rails up x3, bed alarm on, head of 
bed elevated, call light within reach. Will continue to monitor.

## 2020-01-27 NOTE — NUR
RD ASSESSMENT & RECOMMENDATIONS

SEE CARE ACTIVITY FOR COMPLETE ASSESSMENT



DAILY ESTIMATED NEEDS:

Needs based on obesity, DM, critical care, obesity 91.8kg  

11-14kcal/kg actual body wt (106.6kg)  kcals/kg 

6150-5968  total kcals

1.2-2g/kg abw  g protein/kg

110-184  g total protein 

25-30ml/kg abw  mL/kg

3904-6663  total fluid mLs



NUTRITION DIAGNOSIS:

* Swallowing difficulty R/T respiratory status as evidenced by pt orally

intubated and sedated, w/ OGT in place, NPO at this time.

* Altered nutrition related lab values R/T diabetes, liver dysfunction,

clinical status as evidenced by hypoglycemia and hyperglycemia (POC glu

251 [phone]), low mg (1.2), low Phos (1.0), elev T bili and LFTs.





CURRENT TF:Vital 1.2 @30ml /hr 



(PO DIET RECOMMENDATIONS:

SLP eval post extubation -> CARDIAC, CCHO MED)





ENTERAL NUTRITION RECOMMENDATIONS:

Vital AF 1.2 @ 50ml/hr x 24 hrs + Prosource 1pkt BID  

to provide 1200ml, 1440kcal, 90g + 22 prot, 973ml free water 



* As medically appropriate, advance 10ml q 4-6 hrs as tolerated to goal rate

* HOB over 30 degrees/ water flush per MD.

* Add Prosource 1pkt BID to meet protein needs







ADDITIONAL RECOMMENDATIONS:

1) Calibrated bedscale wt 

2) Monitor for hypoglycemia while pt is NPO 

3) Monitor lytes, replete as needed 

4) TF rec as above if medically appropriate to start feeding via OGT

## 2020-01-27 NOTE — NEPHROLOGY PROGRESS NOTE
Assessment/Plan


Problem List:  


(1) Electrolyte imbalance


(2) Acute respiratory failure


(3) Elevated troponin


(4) Diabetes mellitus


Plan





per cardiologist


add nitrates


mag and K supplement as needed


taper steroids


per orders





Subjective


ROS Limited/Unobtainable:  Yes





Objective


Objective





Last 24 Hour Vital Signs








  Date Time  Temp Pulse Resp B/P (MAP) Pulse Ox O2 Delivery O2 Flow Rate FiO2


 


1/27/20 12:57  68 14     40


 


1/27/20 11:20  74 12     40





        40


 


1/27/20 10:00  80 23 132/92 (105) 94   


 


1/27/20 09:40  63 10     40





        40


 


1/27/20 09:28  66 18     60


 


1/27/20 09:00  60 12 123/91 (102) 99   


 


1/27/20 08:20        60


 


1/27/20 08:00 98.2 64 13 121/83 (96) 94   


 


1/27/20 08:00      Mechanical Ventilator  


 


1/27/20 08:00        60


 


1/27/20 07:35  76      


 


1/27/20 07:00  77 14 119/85 (96) 95   


 


1/27/20 06:53  84 18     40





        40


 


1/27/20 06:30  67 14     


 


1/27/20 06:00   14   Mechanical Ventilator  


 


1/27/20 06:00  69 14 120/90 (100) 94   


 


1/27/20 05:49    112/75    


 


1/27/20 05:19  89 16     60


 


1/27/20 05:00  73 14 114/75 (88) 92   


 


1/27/20 05:00   14   Mechanical Ventilator  


 


1/27/20 04:30  83 13 119/77 (91) 93   


 


1/27/20 04:00      Mechanical Ventilator  


 


1/27/20 04:00        60


 


1/27/20 04:00  68      


 


1/27/20 04:00   14   Mechanical Ventilator  


 


1/27/20 04:00  68 15 115/72 (86) 94   


 


1/27/20 03:30  75 20     60


 


1/27/20 03:00   15   Mechanical Ventilator  


 


1/27/20 03:00  66 14 112/75 (87) 94   


 


1/27/20 02:30  65 11 109/70 (83) 95   


 


1/27/20 02:01  69      


 


1/27/20 02:00   14   Mechanical Ventilator  


 


1/27/20 02:00  66 14 107/75 (86) 95   


 


1/27/20 01:30  67 6 104/69 (81) 93   


 


1/27/20 01:02  58 14  93 Mechanical Ventilator  60


 


1/27/20 01:00 98.7 75 14 111/73 (86) 97   


 


1/27/20 01:00   14   Mechanical Ventilator  


 


1/27/20 00:49  61 14     60


 


1/27/20 00:30  59 14 96/63 (74) 93   


 


1/27/20 00:19    100/66    


 


1/27/20 00:00      Mechanical Ventilator  


 


1/27/20 00:00  57 14 100/66 (77) 95   


 


1/27/20 00:00        60


 


1/27/20 00:00  70      


 


1/27/20 00:00   14   Mechanical Ventilator  


 


1/26/20 23:30  57 14 99/67 (78) 95   


 


1/26/20 23:10  57 14     60


 


1/26/20 23:00  58 14 93/65 (74) 94   


 


1/26/20 23:00   14   Mechanical Ventilator  


 


1/26/20 22:30  59 14 102/67 (79) 94   


 


1/26/20 22:00   14   Mechanical Ventilator  


 


1/26/20 22:00  62 14 99/65 (76) 93   


 


1/26/20 21:30  66 14     60


 


1/26/20 21:30  66 14 98/65 (76) 95   


 


1/26/20 21:00   14   Mechanical Ventilator  


 


1/26/20 21:00  70 5 105/71 (82) 95   


 


1/26/20 20:30  66 11 107/72 (84) 95   


 


1/26/20 20:00   16   Mechanical Ventilator  


 


1/26/20 20:00  64 14 104/69 (81) 96   


 


1/26/20 20:00  56      


 


1/26/20 20:00      Mechanical Ventilator  


 


1/26/20 20:00        60


 


1/26/20 19:30 98.7 62 14 106/72 (83) 96   


 


1/26/20 19:14  57 14     60


 


1/26/20 19:00  55 14 99/68 (78) 95   


 


1/26/20 19:00   16   Mechanical Ventilator  


 


1/26/20 18:00  56 14 100/66 (77) 94   


 


1/26/20 18:00   14   Mechanical Ventilator  60


 


1/26/20 17:35    98/69    


 


1/26/20 17:15 98.6       


 


1/26/20 17:00  57 14 104/62 (76) 93   


 


1/26/20 17:00   16   Mechanical Ventilator  60


 


1/26/20 16:45   14   Mechanical Ventilator 12.0 60


 


1/26/20 16:43  57 14     60


 


1/26/20 16:00 98.6 67 14 111/72 (85) 93   


 


1/26/20 16:00  59      


 


1/26/20 16:00      Mechanical Ventilator  


 


1/26/20 16:00        60


 


1/26/20 16:00   16   Mechanical Ventilator  60


 


1/26/20 15:00   16   Mechanical Ventilator  60


 


1/26/20 15:00  64 14 106/73 (84) 99   


 


1/26/20 14:49  71 14     60


 


1/26/20 14:30  58 15 105/72 (83) 99   


 


1/26/20 14:00  59 14 102/70 (81) 99   


 


1/26/20 14:00   16   Mechanical Ventilator  60

















Intake and Output  


 


 1/26/20 1/27/20





 19:00 07:00


 


Intake Total 658 ml 590 ml


 


Output Total 360 ml 410 ml


 


Balance 298 ml 180 ml


 


  


 


Intake Oral 0 ml 


 


Free Water 120 ml 60 ml


 


IV Total 418 ml 170 ml


 


Tube Feeding 120 ml 360 ml


 


Output Urine Total 360 ml 410 ml











Current Medications








 Medications


  (Trade)  Dose


 Ordered  Sig/German


 Route


 PRN Reason  Start Time


 Stop Time Status Last Admin


Dose Admin


 


 Acetaminophen


  (Tylenol)  650 mg  Q6H  PRN


 ORAL


 Mild Pain/Temp > 100.5  1/27/20 18:00


 2/21/20 17:59 UNV  


 


 


 Aspirin


  (ASA)  81 mg  DAILY


 ORAL


   1/28/20 09:00


 2/21/20 08:59 UNV  


 


 


 Ceftriaxone


 Sodium 1 gm/


 Sodium Chloride  55 ml @ 


 110 mls/hr  Q24H


 IVPB


   1/27/20 16:00


 1/30/20 15:59 UNV  


 


 


 Dextrose


  (Dextrose 50%)  25 ml  Q30M  PRN


 IV


 Hypoglycemia  1/27/20 12:45


 2/23/20 07:14 UNV  


 


 


 Dextrose


  (Dextrose 50%)  50 ml  Q30M  PRN


 IV


 Hypoglycemia  1/27/20 12:45


 2/23/20 07:14 UNV  


 


 


 Fentanyl Citrate


 2500 mcg/Sodium


 Chloride  250 ml @ 0


 mls/hr  Q24H


 IV


   1/28/20 09:00


 2/1/20 08:59 UNV  


 


 


 Guaifenesin/


 Dextromethorphan


  (Robitussin DM


 Syrup)  5 ml  Q6H  PRN


 ORAL


 For Cough  1/27/20 18:00


 2/21/20 17:59 UNV  


 


 


 Haloperidol


 Lactate


  (Haldol)  5 mg  Q4H  PRN


 IM


 Agitation  1/27/20 13:00


 2/22/20 12:59 UNV  


 


 


 Insulin Aspart


  (NovoLOG)    BEFORE MEALS AND  HS


 SUBQ


   1/27/20 16:30


 2/23/20 20:59 UNV  


 


 


 Insulin Detemir


  (Levemir)  10 units  Q12HR


 SUBQ


   1/27/20 21:00


 2/25/20 20:59 UNV  


 


 


 Iohexol


  (Omnipaque)  100 mg  NOW  PRN


 INJ


 Radiology Procedure  1/28/20 12:15


 1/31/20 12:07 UNV  


 


 


 Lorazepam


  (Ativan 2mg/ml


 1ml)  2 mg  Q2H  PRN


 IV


 For Anxiety  1/27/20 13:15


 1/30/20 17:14 UNV  


 


 


 Methylprednisolone


 Sodium Succinate


  (Solu-MEDROL)  40 mg  EVERY 8  HOURS


 IVP


   1/27/20 14:00


 2/25/20 12:59 UNV  


 


 


 Morphine Sulfate


  (Morphine


 Sulfate)  2 mg  Q2H  PRN


 IVP


 For Pain  1/27/20 13:15


 1/30/20 17:14 UNV  


 


 


 Nitroglycerin


  (Nitro-Bid)  1 inch  TID@0600,1200,1800


 TOPIC


   1/27/20 18:00


 2/26/20 05:59 UNV  


 


 


 Pantoprazole


  (Protonix)  40 mg  EVERY 12  HOURS


 IVP


   1/27/20 21:00


 2/22/20 20:59 UNV  


 


 


 Risperidone


  (RisperDAL)  2 mg  QHS


 ORAL


   1/27/20 21:00


 2/21/20 20:59 UNV  


 


 


 Trazodone HCl


  (Desyrel)  100 mg  BEDTIME


 ORAL


   1/27/20 21:00


 2/21/20 20:59 UNV  


 





Laboratory Tests


1/27/20 05:20: 


White Blood Count 10.6, Red Blood Count 6.14H, Hemoglobin 15.9, Hematocrit 52.0

, Mean Corpuscular Volume 85, Mean Corpuscular Hemoglobin 26.0L, Mean 

Corpuscular Hemoglobin Concent 30.6L, Red Cell Distribution Width 17.8H, 

Platelet Count 160, Mean Platelet Volume 7.3, Neutrophils (%) (Auto) , 

Lymphocytes (%) (Auto) , Monocytes (%) (Auto) , Eosinophils (%) (Auto) , 

Basophils (%) (Auto) , Sodium Level 142, Potassium Level 3.8, Chloride Level 105

, Carbon Dioxide Level 36H, Anion Gap 1L, Blood Urea Nitrogen 25H, Creatinine 

0.7, Estimat Glomerular Filtration Rate > 60, Glucose Level 153H, Uric Acid 7.2

, Calcium Level 8.0L, Phosphorus Level 4.1, Magnesium Level 1.9, Total 

Bilirubin 1.2H, Direct Bilirubin 0.5H, Aspartate Amino Transf (AST/SGOT) 23, 

Alanine Aminotransferase (ALT/SGPT) 277H, Alkaline Phosphatase 79, Troponin I 

0.820H, C-Reactive Protein, Quantitative 0.5, Pro-B-Type Natriuretic Peptide 

6019H, Total Protein 5.2L, Albumin 2.2L, Globulin 3.0, Albumin/Globulin Ratio 

0.7L


1/27/20 09:14: 


Arterial Blood pH 7.418, Arterial Blood Partial Pressure CO2 60.1*H, Arterial 

Blood Partial Pressure O2 115.6H, Arterial Blood HCO3 37.9H, Arterial Blood 

Oxygen Saturation 98.1, Arterial Blood Base Excess 10.6*H, Frandy Test Positive


1/27/20 12:40: 


Arterial Blood pH 7.370, Arterial Blood Partial Pressure CO2 63.7*H, Arterial 

Blood Partial Pressure O2 72.6L, Arterial Blood HCO3 36.0H, Arterial Blood 

Oxygen Saturation 93.6L, Arterial Blood Base Excess 8.0H, Frandy Test Positive


Height (Feet):  6


Height (Inches):  3.00


Weight (Pounds):  240


General Appearance:  no apparent distress


EENT:  other - intubated


Respiratory/Chest:  decreased breath sounds


Abdomen:  distended











Lawson Vergara MD Jan 27, 2020 13:21

## 2020-01-27 NOTE — NUR
NURSE NOTES:

Approximately at 1430, patient coughed and OGT became dislodged. Will continue to monitor.

## 2020-01-27 NOTE — NUR
RESPIRATORY NOTES:

Weaning successful. Currently no extubation order. Placed patient back onto acvc settings at 
1257. ABG done during SBT. MILADIS GARCIA aware.

## 2020-01-27 NOTE — DIAGNOSTIC IMAGING REPORT
Indication: Chest pain

 

Technique: Continuous helical transaxial imaging of the chest was obtained from the

thoracic inlet to the upper abdomen during rapid intravenous contrast administration.

Arterial phase of enhancement obtained. Coronal 2-D reformats were also obtained and

maximum intensity projection images in multiple planes. Study obtained in a Siemens

sensation 64 slice CT. Automatic Exposure Control was utilized.

 

 

Total Dose length Product (DLP):  944 mGycm

 

CT Dose Index Volume (CTDIvol):   106.7 mGy

 

Comparison: None

 

Findings: Gynecomastia noted. There is reasonable opacification of the pulmonary

artery. No filling defects within the pulmonary artery are identified. The aorta is

tortuous and mildly ectatic. The heart is enlarged. Endotracheal tube is present in

good position. Nasogastric tube present with the tip well situated in the stomach.

Posterior basal densities are present likely atelectasis. Trace pericardial fluid

noted. Mild patchy areas of hyperlucency noted within the upper lobes may be a small

pneumatoceles or emphysema. There is a moderate artifact related to beam starvation.

Gallstones are present. There is generalized subcutaneous reticulation present.

 

IMPRESSION:

 

No evidence of pulmonary embolus, aortic dissection or aneurysm.

 

Generalized cardiomegaly.

 

Posterior basal atelectasis.

 

Trace pericardial fluid

 

Endotracheal tube and nasogastric tube in good position.

 

Gynecomastia.

 

Anasarca.

 

Gallstones.

 

 

 

 

 

The CT scanner at [facility] is accredited by the [employer] College of

Radiology and the scans are performed using dose optimization techniques as

appropriate to a performed exam including Automatic Exposure control.

## 2020-01-28 VITALS — DIASTOLIC BLOOD PRESSURE: 75 MMHG | SYSTOLIC BLOOD PRESSURE: 109 MMHG

## 2020-01-28 VITALS — DIASTOLIC BLOOD PRESSURE: 95 MMHG | SYSTOLIC BLOOD PRESSURE: 131 MMHG

## 2020-01-28 VITALS — DIASTOLIC BLOOD PRESSURE: 57 MMHG | SYSTOLIC BLOOD PRESSURE: 108 MMHG

## 2020-01-28 VITALS — SYSTOLIC BLOOD PRESSURE: 126 MMHG | DIASTOLIC BLOOD PRESSURE: 88 MMHG

## 2020-01-28 VITALS — DIASTOLIC BLOOD PRESSURE: 88 MMHG | SYSTOLIC BLOOD PRESSURE: 124 MMHG

## 2020-01-28 VITALS — DIASTOLIC BLOOD PRESSURE: 89 MMHG | SYSTOLIC BLOOD PRESSURE: 120 MMHG

## 2020-01-28 VITALS — DIASTOLIC BLOOD PRESSURE: 55 MMHG | SYSTOLIC BLOOD PRESSURE: 134 MMHG

## 2020-01-28 VITALS — DIASTOLIC BLOOD PRESSURE: 83 MMHG | SYSTOLIC BLOOD PRESSURE: 122 MMHG

## 2020-01-28 VITALS — SYSTOLIC BLOOD PRESSURE: 127 MMHG | DIASTOLIC BLOOD PRESSURE: 96 MMHG

## 2020-01-28 VITALS — DIASTOLIC BLOOD PRESSURE: 93 MMHG | SYSTOLIC BLOOD PRESSURE: 129 MMHG

## 2020-01-28 VITALS — DIASTOLIC BLOOD PRESSURE: 91 MMHG | SYSTOLIC BLOOD PRESSURE: 130 MMHG

## 2020-01-28 VITALS — SYSTOLIC BLOOD PRESSURE: 127 MMHG | DIASTOLIC BLOOD PRESSURE: 85 MMHG

## 2020-01-28 VITALS — SYSTOLIC BLOOD PRESSURE: 123 MMHG | DIASTOLIC BLOOD PRESSURE: 84 MMHG

## 2020-01-28 VITALS — DIASTOLIC BLOOD PRESSURE: 95 MMHG | SYSTOLIC BLOOD PRESSURE: 134 MMHG

## 2020-01-28 VITALS — DIASTOLIC BLOOD PRESSURE: 85 MMHG | SYSTOLIC BLOOD PRESSURE: 122 MMHG

## 2020-01-28 VITALS — DIASTOLIC BLOOD PRESSURE: 84 MMHG | SYSTOLIC BLOOD PRESSURE: 119 MMHG

## 2020-01-28 VITALS — DIASTOLIC BLOOD PRESSURE: 91 MMHG | SYSTOLIC BLOOD PRESSURE: 124 MMHG

## 2020-01-28 VITALS — DIASTOLIC BLOOD PRESSURE: 94 MMHG | SYSTOLIC BLOOD PRESSURE: 126 MMHG

## 2020-01-28 VITALS — DIASTOLIC BLOOD PRESSURE: 81 MMHG | SYSTOLIC BLOOD PRESSURE: 117 MMHG

## 2020-01-28 VITALS — DIASTOLIC BLOOD PRESSURE: 90 MMHG | SYSTOLIC BLOOD PRESSURE: 121 MMHG

## 2020-01-28 VITALS — DIASTOLIC BLOOD PRESSURE: 85 MMHG | SYSTOLIC BLOOD PRESSURE: 113 MMHG

## 2020-01-28 VITALS — DIASTOLIC BLOOD PRESSURE: 86 MMHG | SYSTOLIC BLOOD PRESSURE: 123 MMHG

## 2020-01-28 LAB
ALBUMIN SERPL-MCNC: 2.3 G/DL (ref 3.4–5)
ALBUMIN/GLOB SERPL: 0.8 {RATIO} (ref 1–2.7)
ALP SERPL-CCNC: 75 U/L (ref 46–116)
ALT SERPL-CCNC: 221 U/L (ref 12–78)
ANION GAP SERPL CALC-SCNC: 0 MMOL/L (ref 5–15)
AST SERPL-CCNC: 21 U/L (ref 15–37)
BILIRUB DIRECT SERPL-MCNC: 0.6 MG/DL (ref 0–0.3)
BILIRUB SERPL-MCNC: 1.6 MG/DL (ref 0.2–1)
BUN SERPL-MCNC: 26 MG/DL (ref 7–18)
CALCIUM SERPL-MCNC: 8.3 MG/DL (ref 8.5–10.1)
CHLORIDE SERPL-SCNC: 103 MMOL/L (ref 98–107)
CO2 SERPL-SCNC: 39 MMOL/L (ref 21–32)
CREAT SERPL-MCNC: 0.7 MG/DL (ref 0.55–1.3)
GLOBULIN SER-MCNC: 2.8 G/DL
POTASSIUM SERPL-SCNC: 4.3 MMOL/L (ref 3.5–5.1)
SODIUM SERPL-SCNC: 142 MMOL/L (ref 136–145)

## 2020-01-28 RX ADMIN — METHYLPREDNISOLONE SODIUM SUCCINATE SCH MG: 40 INJECTION, POWDER, LYOPHILIZED, FOR SOLUTION INTRAMUSCULAR; INTRAVENOUS at 13:57

## 2020-01-28 RX ADMIN — INSULIN DETEMIR SCH UNITS: 100 INJECTION, SOLUTION SUBCUTANEOUS at 21:01

## 2020-01-28 RX ADMIN — NITROGLYCERIN SCH INCH: 20 OINTMENT TOPICAL at 11:39

## 2020-01-28 RX ADMIN — PANTOPRAZOLE SODIUM SCH MG: 40 INJECTION, POWDER, FOR SOLUTION INTRAVENOUS at 20:59

## 2020-01-28 RX ADMIN — METHYLPREDNISOLONE SODIUM SUCCINATE SCH MG: 40 INJECTION, POWDER, LYOPHILIZED, FOR SOLUTION INTRAMUSCULAR; INTRAVENOUS at 22:04

## 2020-01-28 RX ADMIN — ASPIRIN 81 MG SCH MG: 81 TABLET ORAL at 09:18

## 2020-01-28 RX ADMIN — PANTOPRAZOLE SODIUM SCH MG: 40 INJECTION, POWDER, FOR SOLUTION INTRAVENOUS at 09:19

## 2020-01-28 RX ADMIN — NITROGLYCERIN SCH INCH: 20 OINTMENT TOPICAL at 06:29

## 2020-01-28 RX ADMIN — TRAZODONE HYDROCHLORIDE SCH MG: 100 TABLET ORAL at 20:59

## 2020-01-28 RX ADMIN — INSULIN ASPART SCH UNITS: 100 INJECTION, SOLUTION INTRAVENOUS; SUBCUTANEOUS at 21:00

## 2020-01-28 RX ADMIN — INSULIN ASPART SCH UNITS: 100 INJECTION, SOLUTION INTRAVENOUS; SUBCUTANEOUS at 06:29

## 2020-01-28 RX ADMIN — INSULIN ASPART SCH UNITS: 100 INJECTION, SOLUTION INTRAVENOUS; SUBCUTANEOUS at 17:09

## 2020-01-28 RX ADMIN — INSULIN DETEMIR SCH UNITS: 100 INJECTION, SOLUTION SUBCUTANEOUS at 09:20

## 2020-01-28 RX ADMIN — METHYLPREDNISOLONE SODIUM SUCCINATE SCH MG: 40 INJECTION, POWDER, LYOPHILIZED, FOR SOLUTION INTRAMUSCULAR; INTRAVENOUS at 06:28

## 2020-01-28 RX ADMIN — SODIUM CHLORIDE SCH MLS/HR: 0.9 INJECTION INTRAVENOUS at 15:52

## 2020-01-28 RX ADMIN — INSULIN ASPART SCH UNITS: 100 INJECTION, SOLUTION INTRAVENOUS; SUBCUTANEOUS at 11:39

## 2020-01-28 RX ADMIN — NITROGLYCERIN SCH INCH: 20 OINTMENT TOPICAL at 18:57

## 2020-01-28 NOTE — PROGRESS NOTE
DATE:  01/28/2020

CARDIOLOGY PROGRESS NOTE



SUBJECTIVE:  Condition remains poor.  The patient remains in sinus rhythm.

He has been extubated and is on high flow oxygen.



PHYSICAL EXAMINATION:

VITAL SIGNS:  Blood pressure 109/75, heart rate 71, and respiratory rate

18.

LUNGS:  Bilateral breath sounds.

CARDIAC:  Regular rhythm and rate.  Normal S1 and S2.  A 1/6 systolic

murmur at lower left sternal border.

ABDOMEN:  With ascites.

EXTREMITIES:  With 1+ dependent edema.



LABORATORY DATA:  Sodium 142, potassium 4.3, BUN 26, creatinine 0.7, and

magnesium 1.6.  Troponin down to 0.39.  Pro-natriuretic peptide 11,000.



IMPRESSION:

1. Acute myocardial infarction, likely right heart related.

2. 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

right-sided.

3. Paroxysmal atrial fibrillation.

4. Hypomagnesemia.

5. Dehydration hyponatremia, corrected.

6. COPD exacerbation and status post respiratory failure.



PLAN:

1. Antiplatelet therapy.

2. Monitor platelet counts.

3. Topical nitrates.

4. Steroid taper.

5. Periodic diuresis.

6. Discontinue IV fluids.

7. DVT prophylaxis.

8. Respiratory hygiene.









  ______________________________________________

  Franklin Murphy M.D. DR:  LILY

D:  01/28/2020 14:49

T:  01/28/2020 21:05

JOB#:  [id number]

CC:

## 2020-01-28 NOTE — NEPHROLOGY PROGRESS NOTE
Assessment/Plan


Problem List:  


(1) Electrolyte imbalance


(2) Acute respiratory failure


(3) Elevated troponin


(4) Diabetes mellitus


Plan





now extubated-


per cardiologist


add nitrates


mag and K supplement as needed


taper steroids


per orders





Subjective


ROS Limited/Unobtainable:  No


Constitutional:  Reports: malaise





Objective


Objective





Last 24 Hour Vital Signs








  Date Time  Temp Pulse Resp B/P (MAP) Pulse Ox O2 Delivery O2 Flow Rate FiO2


 


1/28/20 11:00  73 23 124/91 (102) 93   


 


1/28/20 10:00  76 19 131/95 (107) 94   


 


1/28/20 09:00  71 18 109/75 (86) 95   


 


1/28/20 08:00       40.0 45


 


1/28/20 08:00 98.3 70 17 129/93 (105) 96   


 


1/28/20 08:00      Mechanical Ventilator  


 


1/28/20 08:00  70      


 


1/28/20 07:30     96 High Flow 40.0 45


 


1/28/20 07:00  70 19 134/55 (81) 95   


 


1/28/20 06:29    124/88    


 


1/28/20 06:00  75 18 124/88 (100) 95   


 


1/28/20 05:00  66 14 123/86 (98) 98   


 


1/28/20 04:00  68      


 


1/28/20 04:00      Mechanical Ventilator  


 


1/28/20 04:00       40.0 45


 


1/28/20 04:00 98.6 66 23 127/85 (99) 98   


 


1/28/20 03:00     96 High Flow 40.0 45


 


1/28/20 03:00  70 17 119/84 (96) 95   


 


1/28/20 02:00  71 21 117/81 (93) 97   


 


1/28/20 01:00  66 14 122/83 (96) 93   


 


1/28/20 00:00  71      


 


1/28/20 00:00 98.6 73 18 123/84 (97) 95   


 


1/28/20 00:00      Mechanical Ventilator  


 


1/28/20 00:00       40.0 45


 


1/27/20 23:01     97 High Flow 40.0 45


 


1/27/20 23:00  69 15 118/82 (94) 94   


 


1/27/20 22:00  71 19 119/75 (90) 93   


 


1/27/20 21:00  76 16 129/89 (102) 93   


 


1/27/20 20:00 98.5 72 17 130/87 (101) 96   


 


1/27/20 20:00  77      


 


1/27/20 20:00       40.0 45


 


1/27/20 20:00      Mechanical Ventilator  


 


1/27/20 19:05     96 High Flow 40.0 45


 


1/27/20 19:00  73 15 128/90 (103) 96   


 


1/27/20 18:09    129/95    


 


1/27/20 18:00  76 17 133/90 (104) 96   


 


1/27/20 17:00  79 15 136/97 (110) 95   


 


1/27/20 16:00       40.0 45


 


1/27/20 16:00  70      


 


1/27/20 16:00      Mechanical Ventilator  


 


1/27/20 16:00  71 14 125/85 (98) 93   


 


1/27/20 15:00  79  121/90 (100) 91   


 


1/27/20 14:18     95 High Flow 40.0 45


 


1/27/20 14:17      Nasal Cannula 40.0 45


 


1/27/20 14:00  68 14 133/100 (111) 96   


 


1/27/20 13:00  69 15 131/93 (106) 96   


 


1/27/20 12:57  68 14     40


 


1/27/20 12:06  98      


 


1/27/20 12:00      Mechanical Ventilator  


 


1/27/20 12:00        60


 


1/27/20 12:00 98.0 72 11 121/86 (98) 91   

















Intake and Output  


 


 1/27/20 1/28/20





 19:00 07:00


 


Intake Total 235 ml 120 ml


 


Output Total 630 ml 590 ml


 


Balance -395 ml -470 ml


 


  


 


Intake Oral  120 ml


 


IV Total 55 ml 


 


Tube Feeding 180 ml 


 


Output Urine Total 630 ml 590 ml








Laboratory Tests


1/27/20 12:40: 


Arterial Blood pH 7.370, Arterial Blood Partial Pressure CO2 63.7*H, Arterial 

Blood Partial Pressure O2 72.6L, Arterial Blood HCO3 36.0H, Arterial Blood 

Oxygen Saturation 93.6L, Arterial Blood Base Excess 8.0H, Frandy Test Positive


1/28/20 03:30: 


Sodium Level 142, Potassium Level 4.3, Chloride Level 103, Carbon Dioxide Level 

39H, Anion Gap 0L, Blood Urea Nitrogen 26H, Creatinine 0.7, Estimat Glomerular 

Filtration Rate > 60, Glucose Level 141H, Calcium Level 8.3L, Calcium (Send out

) [Pending], Magnesium Level 1.6L, Total Bilirubin 1.6H, Direct Bilirubin 0.6H, 

Aspartate Amino Transf (AST/SGOT) 21, Alanine Aminotransferase (ALT/SGPT) 221H, 

Alkaline Phosphatase 75, Troponin I 0.390H, Pro-B-Type Natriuretic Peptide 

00861M, Total Protein 5.1L, Albumin 2.3L, Globulin 2.8, Albumin/Globulin Ratio 

0.8L, Parathyroid Hormone (Intact) [Pending]


1/28/20 09:58: 


Arterial Blood pH 7.414, Arterial Blood Partial Pressure CO2 65.1*H, Arterial 

Blood Partial Pressure O2 67.5L, Arterial Blood HCO3 40.7*H, Arterial Blood 

Oxygen Saturation 93.3L, Arterial Blood Base Excess 12.5*H, Frandy Test Positive


Height (Feet):  6


Height (Inches):  3.00


Weight (Pounds):  240


General Appearance:  mild distress


EENT:  other - rxtubated


Cardiovascular:  normal rate


Respiratory/Chest:  decreased breath sounds


Abdomen:  distended











Lawson Vergara MD Jan 28, 2020 11:35

## 2020-01-28 NOTE — NUR
NURSE NOTES:

PT in bed; watching TV; A/O x 3; remains cooperative, saturating at 94% on high flow 40L @ 
45%. Will continue to monitor PT.

## 2020-01-28 NOTE — NUR
NURSE NOTES:

Late entry: PT father called asking for update, updates provided, PT VS stable. Will 
continue to monitor.

## 2020-01-28 NOTE — GENERAL PROGRESS NOTE
Assessment/Plan


Problem List:  


(1) Diabetes mellitus


ICD Codes:  E11.9 - Type 2 diabetes mellitus without complications


SNOMED:  92814406


(2) Hypoglycemia


ICD Codes:  E16.2 - Hypoglycemia, unspecified


SNOMED:  525026437


(3) Schizophrenia


ICD Codes:  F20.9 - Schizophrenia, unspecified


SNOMED:  28336605


Qualifiers:  


   Qualified Codes:  F20.9 - Schizophrenia, unspecified


(4) Elevated troponin


ICD Codes:  R79.89 - Other specified abnormal findings of blood chemistry


SNOMED:  962852163, 870920807, 617886107


(5) COPD exacerbation


ICD Codes:  J44.1 - Chronic obstructive pulmonary disease with (acute) 

exacerbation


SNOMED:  064159054


(6) Abnormal LFTs


ICD Codes:  R94.5 - Abnormal results of liver function studies


SNOMED:  677005660


Status:  unchanged


Assessment/Plan:


Levemir 10 units bid - half dose while NPO 


continue NISS 


seems to be ready to start diet





Subjective


Allergies:  


Coded Allergies:  


     No Known Allergies (Unverified , 1/16/18)


All Systems:  reviewed and negative except above


Subjective


events noted 


interval notes reviewed 


extubated yesterday


OGT dislodged 














Item Value  Date Time


 


Bedside Blood Glucose 156 mg/dl H 1/28/20 0629


 


Bedside Blood Glucose 155 mg/dl H 1/27/20 2214


 


Bedside Blood Glucose 139 mg/dl H 1/27/20 1630


 


Bedside Blood Glucose 117 mg/dl 1/27/20 1130


 


Bedside Blood Glucose 104 mg/dl 1/27/20 1002


 


Bedside Blood Glucose 146 mg/dl H 1/27/20 0630











Objective





Last 24 Hour Vital Signs








  Date Time  Temp Pulse Resp B/P (MAP) Pulse Ox O2 Delivery O2 Flow Rate FiO2


 


1/28/20 06:29    124/88    


 


1/28/20 05:00  66 14 123/86 (98) 98   


 


1/28/20 04:00  68      


 


1/28/20 04:00      Mechanical Ventilator  


 


1/28/20 04:00       40.0 45


 


1/28/20 04:00 98.6 66 23 127/85 (99) 98   


 


1/28/20 03:00     96 High Flow 40.0 45


 


1/28/20 03:00  70 17 119/84 (96) 95   


 


1/28/20 02:00  71 21 117/81 (93) 97   


 


1/28/20 01:00  66 14 122/83 (96) 93   


 


1/28/20 00:00  71      


 


1/28/20 00:00 98.6 73 18 123/84 (97) 95   


 


1/28/20 00:00      Mechanical Ventilator  


 


1/28/20 00:00       40.0 45


 


1/27/20 23:01     97 High Flow 40.0 45


 


1/27/20 23:00  69 15 118/82 (94) 94   


 


1/27/20 22:00  71 19 119/75 (90) 93   


 


1/27/20 21:00  76 16 129/89 (102) 93   


 


1/27/20 20:00 98.5 72 17 130/87 (101) 96   


 


1/27/20 20:00  77      


 


1/27/20 20:00       40.0 45


 


1/27/20 20:00      Mechanical Ventilator  


 


1/27/20 19:05     96 High Flow 40.0 45


 


1/27/20 19:00  73 15 128/90 (103) 96   


 


1/27/20 18:09    129/95    


 


1/27/20 18:00  76 17 133/90 (104) 96   


 


1/27/20 17:00  79 15 136/97 (110) 95   


 


1/27/20 16:00       40.0 45


 


1/27/20 16:00  70      


 


1/27/20 16:00      Mechanical Ventilator  


 


1/27/20 16:00  71 14 125/85 (98) 93   


 


1/27/20 15:00  79  121/90 (100) 91   


 


1/27/20 14:18     95 High Flow 40.0 45


 


1/27/20 14:17      Nasal Cannula 40.0 45


 


1/27/20 14:00  68 14 133/100 (111) 96   


 


1/27/20 13:00  69 15 131/93 (106) 96   


 


1/27/20 12:57  68 14     40


 


1/27/20 12:06  98      


 


1/27/20 12:00      Mechanical Ventilator  


 


1/27/20 12:00        60


 


1/27/20 12:00 98.0 72 11 121/86 (98) 91   


 


1/27/20 11:20  74 12     40





        40


 


1/27/20 11:00  100 19 132/99 (110) 97   


 


1/27/20 10:00  80 23 132/92 (105) 94   


 


1/27/20 09:40  63 10     40





        40


 


1/27/20 09:28  66 18     60


 


1/27/20 09:00  60 12 123/91 (102) 99   


 


1/27/20 08:20        60


 


1/27/20 08:00 98.2 64 13 121/83 (96) 94   


 


1/27/20 08:00      Mechanical Ventilator  


 


1/27/20 08:00        60


 


1/27/20 07:35  76      

















Intake and Output  


 


 1/27/20 1/28/20





 19:00 07:00


 


Intake Total 235 ml 120 ml


 


Output Total 630 ml 490 ml


 


Balance -395 ml -370 ml


 


  


 


Intake Oral  120 ml


 


IV Total 55 ml 


 


Tube Feeding 180 ml 


 


Output Urine Total 630 ml 490 ml








Laboratory Tests


1/27/20 09:14: 


Arterial Blood pH 7.418, Arterial Blood Partial Pressure CO2 60.1*H, Arterial 

Blood Partial Pressure O2 115.6H, Arterial Blood HCO3 37.9H, Arterial Blood 

Oxygen Saturation 98.1, Arterial Blood Base Excess 10.6*H, Frandy Test Positive


1/27/20 12:40: 


Arterial Blood pH 7.370, Arterial Blood Partial Pressure CO2 63.7*H, Arterial 

Blood Partial Pressure O2 72.6L, Arterial Blood HCO3 36.0H, Arterial Blood 

Oxygen Saturation 93.6L, Arterial Blood Base Excess 8.0H, Frandy Test Positive


1/28/20 03:30: 


Sodium Level 142, Potassium Level 4.3, Chloride Level 103, Carbon Dioxide Level 

39H, Anion Gap 0L, Blood Urea Nitrogen 26H, Creatinine 0.7, Estimat Glomerular 

Filtration Rate > 60, Glucose Level 141H, Calcium Level 8.3L, Magnesium Level [

Pending], Total Bilirubin 1.6H, Direct Bilirubin 0.6H, Aspartate Amino Transf (

AST/SGOT) 21, Alanine Aminotransferase (ALT/SGPT) 221H, Alkaline Phosphatase 75

, Troponin I 0.390H, Pro-B-Type Natriuretic Peptide 81364H, Total Protein 5.1L, 

Albumin 2.3L, Globulin 2.8, Albumin/Globulin Ratio 0.8L


Height (Feet):  6


Height (Inches):  3.00


Weight (Pounds):  240


General Appearance:  no apparent distress


Neck:  normal alignment


Cardiovascular:  normal rate


Respiratory/Chest:  lungs clear


Abdomen:  normal bowel sounds


Objective





Current Medications








 Medications


  (Trade)  Dose


 Ordered  Sig/German


 Route


 PRN Reason  Start Time


 Stop Time Status Last Admin


Dose Admin


 


 Acetaminophen


  (Tylenol)  650 mg  Q6H  PRN


 ORAL


 Mild Pain/Temp > 100.5  1/27/20 13:30


 2/21/20 13:29   


 


 


 Aspirin


  (ASA)  81 mg  DAILY


 ORAL


   1/28/20 09:00


 2/21/20 08:59   


 


 


 Ceftriaxone


 Sodium 1 gm/


 Sodium Chloride  55 ml @ 


 110 mls/hr  Q24H


 IVPB


   1/27/20 16:00


 1/30/20 15:59  1/27/20 16:35


 


 


 Dextrose


  (Dextrose 50%)  25 ml  Q30M  PRN


 IV


 Hypoglycemia  1/27/20 12:45


 2/23/20 07:14   


 


 


 Dextrose


  (Dextrose 50%)  50 ml  Q30M  PRN


 IV


 Hypoglycemia  1/27/20 12:45


 2/23/20 07:14   


 


 


 Guaifenesin/


 Dextromethorphan


  (Robitussin DM


 Syrup)  5 ml  Q6H  PRN


 ORAL


 For Cough  1/27/20 13:30


 2/21/20 13:29   


 


 


 Haloperidol


 Lactate


  (Haldol)  5 mg  Q4H  PRN


 IM


 Agitation  1/27/20 13:00


 2/22/20 12:59   


 


 


 Insulin Aspart


  (NovoLOG)    BEFORE MEALS AND  HS


 SUBQ


   1/27/20 16:30


 2/23/20 20:59  1/28/20 06:29


 


 


 Insulin Detemir


  (Levemir)  10 units  Q12HR


 SUBQ


   1/27/20 21:00


 2/25/20 20:59  1/27/20 22:14


 


 


 Iohexol


  (Omnipaque)  100 mg  NOW  PRN


 INJ


 Radiology Procedure  1/28/20 12:15


 1/31/20 12:07   


 


 


 Lorazepam


  (Ativan 2mg/ml


 1ml)  2 mg  Q2H  PRN


 IV


 For Anxiety  1/27/20 13:15


 1/30/20 17:14   


 


 


 Methylprednisolone


 Sodium Succinate


  (Solu-MEDROL)  40 mg  EVERY 8  HOURS


 IVP


   1/27/20 14:00


 2/25/20 12:59  1/28/20 06:28


 


 


 Morphine Sulfate


  (Morphine


 Sulfate)  2 mg  Q2H  PRN


 IVP


 For Pain  1/27/20 13:15


 1/30/20 17:14   


 


 


 Nitroglycerin


  (Nitro-Bid)  1 inch  TID@0600,1200,1800


 TOPIC


   1/27/20 18:00


 2/26/20 05:59  1/28/20 06:29


 


 


 Pantoprazole


  (Protonix)  40 mg  EVERY 12  HOURS


 IVP


   1/27/20 21:00


 2/22/20 20:59  1/27/20 21:22


 


 


 Risperidone


  (RisperDAL)  2 mg  QHS


 ORAL


   1/27/20 21:00


 2/21/20 20:59  1/27/20 21:23


 


 


 Trazodone HCl


  (Desyrel)  100 mg  BEDTIME


 ORAL


   1/27/20 21:00


 2/21/20 20:59  1/27/20 21:23


 

















Nathen Joiner MD Jan 28, 2020 07:15

## 2020-01-28 NOTE — GERIATRIC MEDICINE PROG NOTE
DATE:  01/21/2020



SUBJECTIVE:  The patient is _trying_ to self-extubate, but still in

respiratory insufficiency __



OBJECTIVE:

VITAL SIGNS:  Blood pressure 115/81, pulse _80, respiratory rate 20, and

temperature 98.

RESPIRATORY:  Clear.

CARDIOVASCULAR:  Regular.



LABORATORY DATA: glucose 120



ASSESSMENT:Diabetes Mellitus controlled



PLANS:  Patient to be extubated  and start oral feedings .









  ______________________________________________

  Skinny Lozano M.D.





DR:  LIZET

D:  01/26/2020 00:38

T:  01/26/2020 12:35

JOB#:  [id number]

CC:



ROLAND

## 2020-01-28 NOTE — GI PROGRESS NOTE
Assessment/Plan


Problems:  


(1) Abnormal LFTs


ICD Codes:  R94.5 - Abnormal results of liver function studies


SNOMED:  534765488


(2) COPD exacerbation


ICD Codes:  J44.1 - Chronic obstructive pulmonary disease with (acute) 

exacerbation


SNOMED:  163792052


(3) Elevated troponin


ICD Codes:  R79.89 - Other specified abnormal findings of blood chemistry


SNOMED:  543188727, 409628787, 737365796


(4) Diabetes mellitus


ICD Codes:  E11.9 - Type 2 diabetes mellitus without complications


SNOMED:  69148072


Status:  progressing


Status Narrative


Discussed with Dr. Reeves.


Assessment/Plan


Abdominal US reviewed >> Cholelithiasis. Hepatosplenomegaly.  Some degree of 

portal hypertension not excluded. Trace ascites


We will obtain hepatitis panel to rule out any viral hepatitis >> negative


Elevated LFTs, downtrending





advance diet as tolerated


hold statins


on NC


ppi


prn transfusion


repeat liver function tests for tomorrow


will follow on a daily basis with additional recommendations


recommend outpatient fibroid scan to grade cirrhosis





The patient was seen and examined at bedside and all new and available data was 

reviewed in the patients chart. I agree with the above findings, impression 

and plan.  (Patient seen earlier today. Signature stamp does not reflect 

patient encounter time.). - Camron Reeves MD





Subjective


Subjective


SOB





Objective





Last 24 Hour Vital Signs








  Date Time  Temp Pulse Resp B/P (MAP) Pulse Ox O2 Delivery O2 Flow Rate FiO2


 


1/28/20 13:00  79 19 122/85 (97) 92   


 


1/28/20 12:00       40.0 45


 


1/28/20 12:00  73      


 


1/28/20 12:00 98.5 71 18 134/95 (108) 94   


 


1/28/20 12:00      Mechanical Ventilator  


 


1/28/20 11:39    129/93    


 


1/28/20 11:00  73 23 124/91 (102) 93   


 


1/28/20 10:39     94 High Flow 40.0 45


 


1/28/20 10:00  76 19 131/95 (107) 94   


 


1/28/20 09:00  71 18 109/75 (86) 95   


 


1/28/20 08:00       40.0 45


 


1/28/20 08:00 98.3 70 17 129/93 (105) 96   


 


1/28/20 08:00      Mechanical Ventilator  


 


1/28/20 08:00  70      


 


1/28/20 07:30     96 High Flow 40.0 45


 


1/28/20 07:00  70 19 134/55 (81) 95   


 


1/28/20 06:29    124/88    


 


1/28/20 06:00  75 18 124/88 (100) 95   


 


1/28/20 05:00  66 14 123/86 (98) 98   


 


1/28/20 04:00  68      


 


1/28/20 04:00      Mechanical Ventilator  


 


1/28/20 04:00       40.0 45


 


1/28/20 04:00 98.6 66 23 127/85 (99) 98   


 


1/28/20 03:00     96 High Flow 40.0 45


 


1/28/20 03:00  70 17 119/84 (96) 95   


 


1/28/20 02:00  71 21 117/81 (93) 97   


 


1/28/20 01:00  66 14 122/83 (96) 93   


 


1/28/20 00:00  71      


 


1/28/20 00:00 98.6 73 18 123/84 (97) 95   


 


1/28/20 00:00      Mechanical Ventilator  


 


1/28/20 00:00       40.0 45


 


1/27/20 23:01     97 High Flow 40.0 45


 


1/27/20 23:00  69 15 118/82 (94) 94   


 


1/27/20 22:00  71 19 119/75 (90) 93   


 


1/27/20 21:00  76 16 129/89 (102) 93   


 


1/27/20 20:00 98.5 72 17 130/87 (101) 96   


 


1/27/20 20:00  77      


 


1/27/20 20:00       40.0 45


 


1/27/20 20:00      Mechanical Ventilator  


 


1/27/20 19:05     96 High Flow 40.0 45


 


1/27/20 19:00  73 15 128/90 (103) 96   


 


1/27/20 18:09    129/95    


 


1/27/20 18:00  76 17 133/90 (104) 96   


 


1/27/20 17:00  79 15 136/97 (110) 95   


 


1/27/20 16:00       40.0 45


 


1/27/20 16:00  70      


 


1/27/20 16:00      Mechanical Ventilator  


 


1/27/20 16:00  71 14 125/85 (98) 93   


 


1/27/20 15:00  79  121/90 (100) 91   


 


1/27/20 14:18     95 High Flow 40.0 45


 


1/27/20 14:17      Nasal Cannula 40.0 45


 


1/27/20 14:00  68 14 133/100 (111) 96   

















Intake and Output  


 


 1/27/20 1/28/20





 19:00 07:00


 


Intake Total 235 ml 120 ml


 


Output Total 630 ml 590 ml


 


Balance -395 ml -470 ml


 


  


 


Intake Oral  120 ml


 


IV Total 55 ml 


 


Tube Feeding 180 ml 


 


Output Urine Total 630 ml 590 ml











Laboratory Tests








Test


  1/28/20


03:30 1/28/20


09:58


 


Sodium Level


  142 MMOL/L


(136-145) 


 


 


Potassium Level


  4.3 MMOL/L


(3.5-5.1) 


 


 


Chloride Level


  103 MMOL/L


() 


 


 


Carbon Dioxide Level


  39 MMOL/L


(21-32)  H 


 


 


Anion Gap


  0 mmol/L


(5-15)  L 


 


 


Blood Urea Nitrogen


  26 mg/dL


(7-18)  H 


 


 


Creatinine


  0.7 MG/DL


(0.55-1.30) 


 


 


Estimat Glomerular Filtration


Rate > 60 mL/min


(>60) 


 


 


Glucose Level


  141 MG/DL


()  H 


 


 


Calcium Level


  8.3 MG/DL


(8.5-10.1)  L 


 


 


Calcium (Send out) Pending   


 


Magnesium Level


  1.6 MG/DL


(1.8-2.4)  L 


 


 


Total Bilirubin


  1.6 MG/DL


(0.2-1.0)  H 


 


 


Direct Bilirubin


  0.6 MG/DL


(0.0-0.3)  H 


 


 


Aspartate Amino Transf


(AST/SGOT) 21 U/L (15-37)


  


 


 


Alanine Aminotransferase


(ALT/SGPT) 221 U/L


(12-78)  H 


 


 


Alkaline Phosphatase


  75 U/L


() 


 


 


Troponin I


  0.390 ng/mL


(0.000-0.056) 


 


 


Pro-B-Type Natriuretic Peptide


  23820 pg/mL


(0-125)  H 


 


 


Total Protein


  5.1 G/DL


(6.4-8.2)  L 


 


 


Albumin


  2.3 G/DL


(3.4-5.0)  L 


 


 


Globulin 2.8 g/dL   


 


Albumin/Globulin Ratio


  0.8 (1.0-2.7)


L 


 


 


Parathyroid Hormone (Intact) Pending   


 


Arterial Blood pH


  


  7.414


(7.350-7.450)


 


Arterial Blood Partial


Pressure CO2 


  65.1 mmHg


(35.0-45.0)  *H


 


Arterial Blood Partial


Pressure O2 


  67.5 mmHg


(75.0-100.0)  L


 


Arterial Blood HCO3


  


  40.7 mmol/L


(22.0-26.0)  *H


 


Arterial Blood Oxygen


Saturation 


  93.3 %


()  L


 


Arterial Blood Base Excess  12.5 (-2-2)  *H


 


Frandy Test  Positive  








Height (Feet):  6


Height (Inches):  3.00


Weight (Pounds):  240


General Appearance:  WD/WN, no apparent distress, alert


Cardiovascular:  normal rate


Respiratory/Chest:  normal breath sounds, no respiratory distress


Abdominal Exam:  normal bowel sounds, non tender, soft


Extremities:  normal range of motion, non-tender











Rashid Webber NP Jan 28, 2020 13:15

## 2020-01-28 NOTE — PROGRESS NOTE
DATE:  01/27/2020

SUBJECTIVE:  The patient remains on ventilator support.  Weaning efforts

are initiated.



PHYSICAL EXAMINATION:

VITAL SIGNS:  Blood pressure 121/83, pulse 64, respiratory rate 18, and

afebrile.  Sinus rhythm.  No recurrent atrial fibrillation with rapid

arrhythmias.

LUNGS:  Diminished breath sounds and rhonchi.

HEART:  Regular rhythm and rate.  Normal S1 and S2.

ABDOMEN:  Soft.  Moderate ascites.

EXTREMITIES:  1+ edema.



LABORATORY DATA:  White count 10.6 and hemoglobin 15.9.  ABG - 7.37, 64,

73.  Sodium 142, potassium 3.8, bicarb 36, BUN 25, and creatinine 0.7.

Troponin 0.82.  Pro-natriuretic peptide decreased to 6000.



DIAGNOSTIC DATA:  Chest x-ray reveals mild pulmonary venous congestion.



IMPRESSION:

1. Acute myocardial infarction likely right heart involvement.

2. 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

improved.

3. Cirrhosis with ascites.

4. Acute on chronic respiratory acidosis.

5. Acute respiratory failure with hypoxia and hypercarbia.

6. Paroxysmal atrial fibrillation.

7. Remains critical and guarded.



PLAN:

1. Weaning efforts.

2. Titration of psych regimen.

3. Antimicrobials.

4. Anti-platelet therapy.

5. Avoiding beta blockers due to severe obstructive lung disease.

6. Steroids with taper per pulmonologist.

7. Topical nitrates.

8. Review chest radiograph and determine indication for diuresis on a

regular basis.  For now, diuresis has been ordered.









  ______________________________________________

  Franklin Murphy M.D. DR:  JUANITA

D:  01/27/2020 23:02

T:  01/28/2020 02:31

JOB#:  [id number]

CC:

## 2020-01-28 NOTE — PULMONOLOGY PROGRESS NOTE
Assessment/Plan


Assessment/Plan


IMPRESSION:


1. Hypoglycemia. Seen by endocrine; now resolved


2. Metabolic encephalopathy. Resolved


3. Chronic obstructive pulmonary disease with exacerbation. Now intubated 


4. Acute myocardial infarction.


5. Hyponatremia. Corrected


6.Acute on chronic congestive heart failure, possibly systolic and


diastolic.





PLAN:


1. Now extubated


2. qwean down o2; currently on Hi Flow


3. Anti-platelet therapy.


4. Off Fentanyl


5. Current present care


6. DVT prophylaxis.


7. Empiric antimicrobials.








GI, endocrine and cardiology consults appreciated


Discussed psych medication regimen with Dr Harris














  ______________________________________________


True Duffy M.D.





Subjective


Interval Events:  None new; extubated yesterday


Constitutional:  Reports: no symptoms


HEENT:  Repors: no symptoms


Respiratory:  Reports: no symptoms


Cardiovascular:  Reports: no symptoms


Gastrointestinal/Abdominal:  Reports: no symptoms


Allergies:  


Coded Allergies:  


     No Known Allergies (Unverified , 1/16/18)





Objective





Last 24 Hour Vital Signs








  Date Time  Temp Pulse Resp B/P (MAP) Pulse Ox O2 Delivery O2 Flow Rate FiO2


 


1/28/20 10:00  76 19 131/95 (107) 94   


 


1/28/20 09:00  71 18 109/75 (86) 95   


 


1/28/20 08:00       40.0 45


 


1/28/20 08:00 98.3 70 17 129/93 (105) 96   


 


1/28/20 08:00      Mechanical Ventilator  


 


1/28/20 08:00  70      


 


1/28/20 07:30     96 High Flow 40.0 45


 


1/28/20 07:00  70 19 134/55 (81) 95   


 


1/28/20 06:29    124/88    


 


1/28/20 06:00  75 18 124/88 (100) 95   


 


1/28/20 05:00  66 14 123/86 (98) 98   


 


1/28/20 04:00  68      


 


1/28/20 04:00      Mechanical Ventilator  


 


1/28/20 04:00       40.0 45


 


1/28/20 04:00 98.6 66 23 127/85 (99) 98   


 


1/28/20 03:00     96 High Flow 40.0 45


 


1/28/20 03:00  70 17 119/84 (96) 95   


 


1/28/20 02:00  71 21 117/81 (93) 97   


 


1/28/20 01:00  66 14 122/83 (96) 93   


 


1/28/20 00:00  71      


 


1/28/20 00:00 98.6 73 18 123/84 (97) 95   


 


1/28/20 00:00      Mechanical Ventilator  


 


1/28/20 00:00       40.0 45


 


1/27/20 23:01     97 High Flow 40.0 45


 


1/27/20 23:00  69 15 118/82 (94) 94   


 


1/27/20 22:00  71 19 119/75 (90) 93   


 


1/27/20 21:00  76 16 129/89 (102) 93   


 


1/27/20 20:00 98.5 72 17 130/87 (101) 96   


 


1/27/20 20:00  77      


 


1/27/20 20:00       40.0 45


 


1/27/20 20:00      Mechanical Ventilator  


 


1/27/20 19:05     96 High Flow 40.0 45


 


1/27/20 19:00  73 15 128/90 (103) 96   


 


1/27/20 18:09    129/95    


 


1/27/20 18:00  76 17 133/90 (104) 96   


 


1/27/20 17:00  79 15 136/97 (110) 95   


 


1/27/20 16:00       40.0 45


 


1/27/20 16:00  70      


 


1/27/20 16:00      Mechanical Ventilator  


 


1/27/20 16:00  71 14 125/85 (98) 93   


 


1/27/20 15:00  79  121/90 (100) 91   


 


1/27/20 14:18     95 High Flow 40.0 45


 


1/27/20 14:17      Nasal Cannula 40.0 45


 


1/27/20 14:00  68 14 133/100 (111) 96   


 


1/27/20 13:00  69 15 131/93 (106) 96   


 


1/27/20 12:57  68 14     40


 


1/27/20 12:06  98      


 


1/27/20 12:00      Mechanical Ventilator  


 


1/27/20 12:00        60


 


1/27/20 12:00 98.0 72 11 121/86 (98) 91   


 


1/27/20 11:20  74 12     40





        40


 


1/27/20 11:00  100 19 132/99 (110) 97   

















Intake and Output  


 


 1/27/20 1/28/20





 18:59 06:59


 


Intake Total 265 ml 120 ml


 


Output Total 600 ml 600 ml


 


Balance -335 ml -480 ml


 


  


 


Intake Oral  120 ml


 


IV Total 55 ml 


 


Tube Feeding 210 ml 


 


Output Urine Total 600 ml 600 ml








General Appearance:  no acute distress


HEENT:  normocephalic


Respiratory/Chest:  chest wall non-tender, lungs clear


Cardiovascular:  normal peripheral pulses, normal rate


Abdomen:  normal bowel sounds


Laboratory Tests


1/27/20 12:40: 


Arterial Blood pH 7.370, Arterial Blood Partial Pressure CO2 63.7*H, Arterial 

Blood Partial Pressure O2 72.6L, Arterial Blood HCO3 36.0H, Arterial Blood 

Oxygen Saturation 93.6L, Arterial Blood Base Excess 8.0H, Frandy Test Positive


1/28/20 03:30: 


Sodium Level 142, Potassium Level 4.3, Chloride Level 103, Carbon Dioxide Level 

39H, Anion Gap 0L, Blood Urea Nitrogen 26H, Creatinine 0.7, Estimat Glomerular 

Filtration Rate > 60, Glucose Level 141H, Calcium Level 8.3L, Calcium (Send out

) [Pending], Magnesium Level 1.6L, Total Bilirubin 1.6H, Direct Bilirubin 0.6H, 

Aspartate Amino Transf (AST/SGOT) 21, Alanine Aminotransferase (ALT/SGPT) 221H, 

Alkaline Phosphatase 75, Troponin I 0.390H, Pro-B-Type Natriuretic Peptide 

34317F, Total Protein 5.1L, Albumin 2.3L, Globulin 2.8, Albumin/Globulin Ratio 

0.8L, Parathyroid Hormone (Intact) [Pending]


1/28/20 09:58: 


Arterial Blood pH 7.414, Arterial Blood Partial Pressure CO2 65.1*H, Arterial 

Blood Partial Pressure O2 67.5L, Arterial Blood HCO3 40.7*H, Arterial Blood 

Oxygen Saturation 93.3L, Arterial Blood Base Excess 12.5*H, Frandy Test Positive





Current Medications








 Medications


  (Trade)  Dose


 Ordered  Sig/German


 Route


 PRN Reason  Start Time


 Stop Time Status Last Admin


Dose Admin


 


 Acetaminophen


  (Tylenol)  650 mg  Q6H  PRN


 ORAL


 Mild Pain/Temp > 100.5  1/27/20 13:30


 2/21/20 13:29   


 


 


 Aspirin


  (ASA)  81 mg  DAILY


 ORAL


   1/28/20 09:00


 2/21/20 08:59  1/28/20 09:18


 


 


 Ceftriaxone


 Sodium 1 gm/


 Sodium Chloride  55 ml @ 


 110 mls/hr  Q24H


 IVPB


   1/27/20 16:00


 1/30/20 15:59  1/27/20 16:35


 


 


 Dextrose


  (Dextrose 50%)  25 ml  Q30M  PRN


 IV


 Hypoglycemia  1/27/20 12:45


 2/23/20 07:14   


 


 


 Dextrose


  (Dextrose 50%)  50 ml  Q30M  PRN


 IV


 Hypoglycemia  1/27/20 12:45


 2/23/20 07:14   


 


 


 Guaifenesin/


 Dextromethorphan


  (Robitussin DM


 Syrup)  5 ml  Q6H  PRN


 ORAL


 For Cough  1/27/20 13:30


 2/21/20 13:29   


 


 


 Haloperidol


 Lactate


  (Haldol)  5 mg  Q4H  PRN


 IM


 Agitation  1/27/20 13:00


 2/22/20 12:59   


 


 


 Insulin Aspart


  (NovoLOG)    BEFORE MEALS AND  HS


 SUBQ


   1/27/20 16:30


 2/23/20 20:59  1/28/20 06:29


 


 


 Insulin Detemir


  (Levemir)  10 units  Q12HR


 SUBQ


   1/27/20 21:00


 2/25/20 20:59  1/28/20 09:20


 


 


 Iohexol


  (Omnipaque)  100 mg  NOW  PRN


 INJ


 Radiology Procedure  1/28/20 12:15


 1/31/20 12:07   


 


 


 Lorazepam


  (Ativan 2mg/ml


 1ml)  2 mg  Q2H  PRN


 IV


 For Anxiety  1/27/20 13:15


 1/30/20 17:14   


 


 


 Magnesium Sulfate  100 ml @ 


 100 mls/hr  Q1H


 IVPB


   1/28/20 10:00


 1/28/20 11:59  1/28/20 10:48


 


 


 Methylprednisolone


 Sodium Succinate


  (Solu-MEDROL)  40 mg  EVERY 8  HOURS


 IVP


   1/27/20 14:00


 2/25/20 12:59  1/28/20 06:28


 


 


 Morphine Sulfate


  (Morphine


 Sulfate)  2 mg  Q2H  PRN


 IVP


 For Pain  1/27/20 13:15


 1/30/20 17:14   


 


 


 Nitroglycerin


  (Nitro-Bid)  1 inch  TID@0600,1200,1800


 TOPIC


   1/27/20 18:00


 2/26/20 05:59  1/28/20 06:29


 


 


 Pantoprazole


  (Protonix)  40 mg  EVERY 12  HOURS


 IVP


   1/27/20 21:00


 2/22/20 20:59  1/28/20 09:19


 


 


 Risperidone


  (RisperDAL)  2 mg  QHS


 ORAL


   1/27/20 21:00


 2/21/20 20:59  1/27/20 21:23


 


 


 Trazodone HCl


  (Desyrel)  100 mg  BEDTIME


 ORAL


   1/27/20 21:00


 2/21/20 20:59  1/27/20 21:23


 

















True Duffy MD Jan 28, 2020 10:58

## 2020-01-28 NOTE — NUR
NURSE NOTES:

Pt's resting in bed, in no acute distress. VS stable. Discarded left over of Fentanyl about 
20ml into the discard box, witnessed by the charge nurse, Susana. Will continue to 
monitor.

## 2020-01-28 NOTE — NUR
NURSE NOTES:

PT and report received from MILADIS Layne; PT received on highflow oxygen 40L @ 45% fio2; 
saturating at 97%; no S/S of respiratory distress during morning rounds with PM nurse; A/O x 
2-3 responds to questions, no confusion noted, follows commands, non-combative; cardiac 
monitor shows SR; /87; NPO status; dee intact draining at lowest position; PT has 
PIV on L-upper arm 20g; R-AC 20g both remains patent intact flushes well, THOM running TKO. 
PT has L-upper chest nitro ointment applied. Will continue to monitor PT and follow through 
with plan of care.

## 2020-01-28 NOTE — NUR
NURSE NOTES:

Late entry: Assisted PT to eat, 100% eaten; no coughing or difficulty swallowing noted. PT 
remains cooperative, will continue to monitor PT.

## 2020-01-28 NOTE — NUR
NURSE NOTES:

MD Tati made rounds; updates given about PT; depending on results of swallow eval; renal 
diet to be ordered for PT. Will continue to monitor PT.

## 2020-01-28 NOTE — NUR
REFERRED FOR SWALLOW EVAL FROM DR CRUZ, SEE FULL REPORT.



DYSPHAGIA RISK FACTORS FOR THIS 49 Y.O.M.:



ACUTE ISSUES:

HYPOGLYCEMIA, RESP DISTRESS/FAIL REQUIRED INTUBATION (X2 SELF EXTUBATED) 

ON VENT 1/23 TO 1/27/20, NOW ON HIGH FLOW 02 40 LITERS Fi02 45%, RESP RATE 

18-20, 02 SATS 96%, LUNGS MILD PULMONARY CONGESTION



H/O COPD, ASTHMA, CHF, PULMONARY EDEMA, RESP D/O, SCHIZOPHRENIA. 

DEPRESSION (ON RISPERDAL), DIABETES, ETOH FALL, GI D/O GERD, OBESITY.



HE IS DISABLED AND HIS FATHER'S NAME IS ON HIS CHART.

? DIET AT [facility]. HAD OGT THEN NGT PULLED (VITAL 

FORMULA) TODAY. REFUSING NGT REINSERTION AND WANTS TO EAT. PER RD NEEDS 

CCHO-MED AND CARDIAC DIET PER DR CRUZ RENAL DIET. NPO UNTIL SWALLOW 

EVAL TODAY.



ALERT AND ABLE TO EXPRESS NEEDS. WHEN ASKED IF HE HAD SWALLOW PROBLEMS AND 

IF HE COUGHED ON WATER AT TIMES, HE SAID HE DID COUGH ON WATER AT TIMES 

BUT DID NOT KNOW FOR HOW LONG. HIS SPEECH WAS MILDLY DYSARTHRIC (LESS 

ARTICULATORY PRECISION) BUT INTELLIGIBLE (? DEVELOPMENTAL AND PREMORBID).

HE HAS MIN TO NO UPPER TEETH AND MIN TEETH ON BOTTOM (NO MOLARS).



INITIAL IMPRESSIONS

S/S OF AT LEAST A MILD OROPHARYNGEAL DYSPHAGIA



TONGUE SPEED SLOWER AND LESS COORDINATED, POOR TO FAIR STRENGTH, LIPS 

GROSSLY FUNCTIONAL, FAIR VOLITIONAL COUGH AND VOICE. 



GIVEN TSP THIN LIQUIDS VIA STRAW SEQUENTIAL SIP, NEEDED TO TAKE ONLY 2 

SIPS AND THEN BREATHE AND REST A MINUTE (THE WOULD GET SOB AND RESP RATE 

WOULD GO UP FROM 20 TO 22 BPM). HE DID FINISH ENTIRE 3 0Z SLOWLY WITH FAIR 

HYOLARYNGEAL EXCURSION AND NO OVERT ASPIRATION.



GIVEN TSP PUREED, NO OVERT ASP NOR INCREASE IN RESP RATE



GIVEN MASTICATED SOLID (1/2 CRACKER), DID TALK WITH FOOD IN MOUTH, CHEWED 

SLOWLY AND GUMMED IT 10 SECONDS (WITH HIS MOUTH OPEN).

HE HAD SOME ELEVATION IN RESP RATE TO 22 BPM THAT WENT DOWN AFTER RESTING 

A MINUTE. NO OVERT ASPIRATION



QUESTIONABLE SILENT ASP RISK  (? DEVELOPMENTAL AND CURRENT NEURO STATUS)



GOOD SP02 96% OVERALL TRIALS  



RECOMMENDATIONS:

GIVEN THAT THE PATIENT WANTS TO EAT FOR QUALITY OF LIFE AND REFUSES NGT 

FEEDINGS PRIOR TO MOD BARIUM SWALLOW STUDY, CONSIDER GIVING PO DIET OF 

PUREED AND NECTAR THICK LIQUIDS FOR NOW WITH POSTED ASP AND REFLUX 

PRECAUTIONS AND ONE TO ONE FEEDING. 



PER RD SEND CARDIAC AND CCHO-MED DIET TYPE. PER DR CRUZ PLACE ON RENAL 

DIET.



CRUSH MED OR AS TOLERATED AND RESP RATE NOT ABOVE 25 BPM.



COMPLETE MODIFIED BARIUM SWALLOW STUDY MBSS IF NEEDS, SKILLED DYSPHAGIA 

MANAGEMENT AND TX, AND COG-COM EVAL/TX IF NEEDS (?DEVELOPMENTAL AND ? ACUTE CHANGES). 



EDUCATED/TRAINED RN (AMNUEL) IN POSTED ASP/REFLUX PRECAUTIONS

## 2020-01-28 NOTE — NUR
NURSE NOTES:

Pt is resting on the bed and watching TV. Tolerated well with current O2 setting and SAO2 
95% noted. Repositioned. Placed fall precaution. Will continue to monitor any change of 
condition.

## 2020-01-28 NOTE — GERIATRIC MEDICINE PROG NOTE
DATE:  01/26/2020







SUBJECTIVE:  The patient is now intubated 



OBJECTIVE:



VS-stable

RESP:clear,CVS-Regular

INV:Glucose 145



ASSESSMEN:1.Diabetes Mellitus in fair control

                  2.Respiratory Filure



PLANS:1.Glucerna 1.2 40 cc/hr per NGT

          2.Levemir 10u sc Q12hrs

          3.Acccuchecks Q6hr with moderate sliding scale Novolog

          



  

            





  ______________________________________________

  Skinny Lozano M.D.





DR:  LIZET

D:  01/26/2020 17:09

T:  01/27/2020 02:50

JOB#:  [id number]

CC:



ROLAND

## 2020-01-28 NOTE — NUR
NURSE NOTES:

Pt is resting on the bed and no sign of acute distress noted. Tolerated well with current O2 
setting. Provided oral hydration with Thicken liquid. Placed fall precaution. Will continue 
to monitor any change of condition.

## 2020-01-28 NOTE — NUR
NURSE NOTES:

Morning meds provided to PT; no difficulty swallowing noted with small amount of water, no 
coughing noted. Will continue to monitor PT.

## 2020-01-29 VITALS — SYSTOLIC BLOOD PRESSURE: 128 MMHG | DIASTOLIC BLOOD PRESSURE: 92 MMHG

## 2020-01-29 VITALS — SYSTOLIC BLOOD PRESSURE: 127 MMHG | DIASTOLIC BLOOD PRESSURE: 89 MMHG

## 2020-01-29 VITALS — SYSTOLIC BLOOD PRESSURE: 120 MMHG | DIASTOLIC BLOOD PRESSURE: 87 MMHG

## 2020-01-29 VITALS — SYSTOLIC BLOOD PRESSURE: 122 MMHG | DIASTOLIC BLOOD PRESSURE: 82 MMHG

## 2020-01-29 VITALS — DIASTOLIC BLOOD PRESSURE: 86 MMHG | SYSTOLIC BLOOD PRESSURE: 126 MMHG

## 2020-01-29 VITALS — SYSTOLIC BLOOD PRESSURE: 121 MMHG | DIASTOLIC BLOOD PRESSURE: 89 MMHG

## 2020-01-29 VITALS — SYSTOLIC BLOOD PRESSURE: 124 MMHG | DIASTOLIC BLOOD PRESSURE: 73 MMHG

## 2020-01-29 VITALS — SYSTOLIC BLOOD PRESSURE: 124 MMHG | DIASTOLIC BLOOD PRESSURE: 80 MMHG

## 2020-01-29 VITALS — SYSTOLIC BLOOD PRESSURE: 124 MMHG | DIASTOLIC BLOOD PRESSURE: 83 MMHG

## 2020-01-29 VITALS — DIASTOLIC BLOOD PRESSURE: 83 MMHG | SYSTOLIC BLOOD PRESSURE: 122 MMHG

## 2020-01-29 VITALS — DIASTOLIC BLOOD PRESSURE: 82 MMHG | SYSTOLIC BLOOD PRESSURE: 118 MMHG

## 2020-01-29 VITALS — DIASTOLIC BLOOD PRESSURE: 91 MMHG | SYSTOLIC BLOOD PRESSURE: 123 MMHG

## 2020-01-29 VITALS — SYSTOLIC BLOOD PRESSURE: 117 MMHG | DIASTOLIC BLOOD PRESSURE: 88 MMHG

## 2020-01-29 VITALS — DIASTOLIC BLOOD PRESSURE: 73 MMHG | SYSTOLIC BLOOD PRESSURE: 109 MMHG

## 2020-01-29 VITALS — SYSTOLIC BLOOD PRESSURE: 114 MMHG | DIASTOLIC BLOOD PRESSURE: 71 MMHG

## 2020-01-29 VITALS — DIASTOLIC BLOOD PRESSURE: 94 MMHG | SYSTOLIC BLOOD PRESSURE: 131 MMHG

## 2020-01-29 LAB
ADD MANUAL DIFF: NO
ANION GAP SERPL CALC-SCNC: 2 MMOL/L (ref 5–15)
BUN SERPL-MCNC: 26 MG/DL (ref 7–18)
CALCIUM SERPL-MCNC: 8.2 MG/DL (ref 8.5–10.1)
CHLORIDE SERPL-SCNC: 103 MMOL/L (ref 98–107)
CO2 SERPL-SCNC: 38 MMOL/L (ref 21–32)
CREAT SERPL-MCNC: 0.7 MG/DL (ref 0.55–1.3)
ERYTHROCYTE [DISTWIDTH] IN BLOOD BY AUTOMATED COUNT: 17.8 % (ref 11.6–14.8)
HCT VFR BLD CALC: 55 % (ref 42–52)
HGB BLD-MCNC: 16.9 G/DL (ref 14.2–18)
MCV RBC AUTO: 85 FL (ref 80–99)
PHOSPHATE SERPL-MCNC: 3.6 MG/DL (ref 2.5–4.9)
PLATELET # BLD: 142 K/UL (ref 150–450)
POTASSIUM SERPL-SCNC: 4.5 MMOL/L (ref 3.5–5.1)
RBC # BLD AUTO: 6.45 M/UL (ref 4.7–6.1)
SODIUM SERPL-SCNC: 143 MMOL/L (ref 136–145)
WBC # BLD AUTO: 11.1 K/UL (ref 4.8–10.8)

## 2020-01-29 RX ADMIN — INSULIN ASPART SCH UNITS: 100 INJECTION, SOLUTION INTRAVENOUS; SUBCUTANEOUS at 06:18

## 2020-01-29 RX ADMIN — PANTOPRAZOLE SODIUM SCH MG: 40 INJECTION, POWDER, FOR SOLUTION INTRAVENOUS at 21:49

## 2020-01-29 RX ADMIN — PANTOPRAZOLE SODIUM SCH MG: 40 INJECTION, POWDER, FOR SOLUTION INTRAVENOUS at 08:32

## 2020-01-29 RX ADMIN — NITROGLYCERIN SCH INCH: 20 OINTMENT TOPICAL at 05:29

## 2020-01-29 RX ADMIN — METHYLPREDNISOLONE SODIUM SUCCINATE SCH MG: 40 INJECTION, POWDER, LYOPHILIZED, FOR SOLUTION INTRAMUSCULAR; INTRAVENOUS at 21:50

## 2020-01-29 RX ADMIN — METHYLPREDNISOLONE SODIUM SUCCINATE SCH MG: 40 INJECTION, POWDER, LYOPHILIZED, FOR SOLUTION INTRAMUSCULAR; INTRAVENOUS at 13:49

## 2020-01-29 RX ADMIN — TRAZODONE HYDROCHLORIDE SCH MG: 100 TABLET ORAL at 21:00

## 2020-01-29 RX ADMIN — INSULIN DETEMIR SCH UNITS: 100 INJECTION, SOLUTION SUBCUTANEOUS at 08:34

## 2020-01-29 RX ADMIN — NITROGLYCERIN SCH INCH: 20 OINTMENT TOPICAL at 11:44

## 2020-01-29 RX ADMIN — INSULIN ASPART SCH UNITS: 100 INJECTION, SOLUTION INTRAVENOUS; SUBCUTANEOUS at 17:34

## 2020-01-29 RX ADMIN — TRAZODONE HYDROCHLORIDE SCH MG: 100 TABLET ORAL at 21:49

## 2020-01-29 RX ADMIN — METHYLPREDNISOLONE SODIUM SUCCINATE SCH MG: 40 INJECTION, POWDER, LYOPHILIZED, FOR SOLUTION INTRAMUSCULAR; INTRAVENOUS at 05:30

## 2020-01-29 RX ADMIN — ASPIRIN 81 MG SCH MG: 81 TABLET ORAL at 08:32

## 2020-01-29 RX ADMIN — INSULIN DETEMIR SCH UNITS: 100 INJECTION, SOLUTION SUBCUTANEOUS at 21:53

## 2020-01-29 RX ADMIN — INSULIN ASPART SCH UNITS: 100 INJECTION, SOLUTION INTRAVENOUS; SUBCUTANEOUS at 21:00

## 2020-01-29 RX ADMIN — INSULIN ASPART SCH UNITS: 100 INJECTION, SOLUTION INTRAVENOUS; SUBCUTANEOUS at 11:43

## 2020-01-29 RX ADMIN — NITROGLYCERIN SCH INCH: 20 OINTMENT TOPICAL at 17:40

## 2020-01-29 NOTE — NEPHROLOGY PROGRESS NOTE
Assessment/Plan


Problem List:  


(1) Electrolyte imbalance


(2) Acute respiratory failure


(3) Elevated troponin


(4) Diabetes mellitus


Plan





Mag supplement


now extubated-


per cardiologist


add nitrates


mag and K supplement as needed


taper steroids


per orders





Subjective


ROS Limited/Unobtainable:  No


Constitutional:  Reports: malaise





Objective


Objective





Last 24 Hour Vital Signs








  Date Time  Temp Pulse Resp B/P (MAP) Pulse Ox O2 Delivery O2 Flow Rate FiO2


 


1/29/20 10:30  73 24 124/80 (95) 96   


 


1/29/20 10:00  72 18 122/83 (96) 96   


 


1/29/20 09:00  76 22 118/82 (94) 95   


 


1/29/20 08:30  78 20 126/86 (99) 94   


 


1/29/20 08:00      Nasal Cannula 40.0 


 


1/29/20 08:00 97.8 72 20 117/88 (98) 97   


 


1/29/20 08:00       40.0 45


 


1/29/20 08:00  79      


 


1/29/20 07:00  69 20 109/73 (85) 96   


 


1/29/20 06:45     95 High Flow 40.0 45


 


1/29/20 06:00  71 19 131/94 (106) 95   


 


1/29/20 05:29    121/89    


 


1/29/20 05:00  65 19 121/89 (100) 96   


 


1/29/20 04:00       40.0 45


 


1/29/20 04:00 98.1 67 18 128/92 (104) 95   


 


1/29/20 04:00      Nasal Cannula 40.0 


 


1/29/20 04:00  69      


 


1/29/20 03:04     94 High Flow 40.0 45


 


1/29/20 03:00  78 21 127/89 (102) 94   


 


1/29/20 02:00  61 19 123/91 (102) 96   


 


1/29/20 01:00  57 16 120/87 (98) 95   


 


1/29/20 00:00      Nasal Cannula 40.0 


 


1/29/20 00:00       40.0 45


 


1/29/20 00:00 98.0 64 13 114/71 (85) 95   


 


1/29/20 00:00  68      


 


1/28/20 23:01     95 High Flow 40.0 45


 


1/28/20 23:00  64 21 113/85 (94) 96   


 


1/28/20 22:00  68 17 120/89 (99) 95   


 


1/28/20 21:00  64 20 130/91 (104) 95   


 


1/28/20 20:00 97.9 66 20 127/96 (106) 95   


 


1/28/20 20:00  64      


 


1/28/20 20:00      Nasal Cannula 40.0 


 


1/28/20 20:00       40.0 45


 


1/28/20 19:02     94 High Flow 40.0 45


 


1/28/20 19:00  69 21 121/90 (100) 94   


 


1/28/20 18:57    126/94    


 


1/28/20 18:00  74 20 108/57 (74) 100   


 


1/28/20 17:00  76 23 131/95 (107) 93   


 


1/28/20 16:00       40.0 45


 


1/28/20 16:00      Mechanical Ventilator  


 


1/28/20 16:00 98.2 73 20 127/96 (106) 94   


 


1/28/20 16:00  72      


 


1/28/20 15:16     93 High Flow 40.0 45


 


1/28/20 15:00  73 18 126/94 (105) 93   


 


1/28/20 14:00  73 16 126/88 (101) 94   


 


1/28/20 13:00  79 19 122/85 (97) 92   


 


1/28/20 12:00       40.0 45


 


1/28/20 12:00  73      


 


1/28/20 12:00 98.5 71 18 134/95 (108) 94   


 


1/28/20 12:00      Mechanical Ventilator  


 


1/28/20 11:39    129/93    

















Intake and Output  


 


 1/28/20 1/29/20





 19:00 07:00


 


Intake Total 465 ml 300 ml


 


Output Total 900 ml 920 ml


 


Balance -435 ml -620 ml


 


  


 


Intake Oral 210 ml 300 ml


 


IV Total 255 ml 


 


Output Urine Total 900 ml 920 ml








Laboratory Tests


1/29/20 03:25: 


White Blood Count 11.1H, Red Blood Count 6.45H, Hemoglobin 16.9, Hematocrit 

55.0H, Mean Corpuscular Volume 85, Mean Corpuscular Hemoglobin 26.2L, Mean 

Corpuscular Hemoglobin Concent 30.7L, Red Cell Distribution Width 17.8H, 

Platelet Count 142L, Mean Platelet Volume 6.9, Neutrophils (%) (Auto) , 

Lymphocytes (%) (Auto) , Monocytes (%) (Auto) , Eosinophils (%) (Auto) , 

Basophils (%) (Auto) , Sodium Level 143, Potassium Level 4.5, Chloride Level 103

, Carbon Dioxide Level 38H, Anion Gap 2L, Blood Urea Nitrogen 26H, Creatinine 

0.7, Estimat Glomerular Filtration Rate > 60, Glucose Level 172H, Calcium Level 

8.2L, Phosphorus Level 3.6, Magnesium Level 1.6L


Height (Feet):  6


Height (Inches):  3.00


Weight (Pounds):  235


General Appearance:  no apparent distress


EENT:  other - on high flow O2


Cardiovascular:  normal rate


Respiratory/Chest:  decreased breath sounds


Abdomen:  distended











Lawson Vergara MD Jan 29, 2020 11:32

## 2020-01-29 NOTE — NUR
NURSE NOTES:

received pt from Eli REDDING., pt is awake and resting on the bed. AO x2-3 confused pt. pt is 
on NC high flow 40L and 45%Fio2. Left FA 20G IV site is intact, clean, and patent. Hernandez is 
intact, clean and draining well with gravity. no SOB and no Respiratory distress noted at 
this moment. per Previous shift, no Dysrhythmia reported. call light within reach. bed at 
the lowest position, alarmed, and locked. will continue to monitor pt with plan of care.

## 2020-01-29 NOTE — NUR
NURSE NOTES:

received pt from ICU, awake, alert, confused, vital signs stable, no co pain, no SOB, skin 
intact, continue monitoring.

## 2020-01-29 NOTE — NUR
NURSE NOTES:

RECEIVED REPORT FROM HELEN JIMÉNEZ  PT IN BED. A/O 3. VERBAL, RESPONSIVE TO NAME. /91, HR 
70, RR 21. SR ON MONITOR. NO C/O PAIN. BILATERAL UPPER AND LOWER EXTREMITY MOVEMENT NOTED. 
CHANG DRAINING URINE. PT ON HIGH FLOW O2 40L, FI02 45% SATING 98%. DIET CCHO MED, 1:1 
FEEDING. NO BM AT THIS TIME. CONTINENT. SKIN INTACT. THOM PICC TKO. FALL PRECAUTIONS IN 
PLACE. CALL LIGHT IN REACH. BED LOW AND LOCKED IN POSITION. EDUCATED PT ON PLAN OF CARE. 
WILL CONTINUE TO MONITOR PT.

## 2020-01-29 NOTE — NUR
NURSE NOTES:

pt states no pain at this moment, pt shows no SOB with high flow NC. but pt is having a 
difficult time to swallow. will keep monitor pt with HOB. call light within reach.

## 2020-01-29 NOTE — NUR
HAND-OFF: 

Report given to MILADIS Hill. Pt is resting on the bed and no sign of acute distress 
noted.

## 2020-01-29 NOTE — NUR
TRANSFER TO FLOOR:

Patient transferred to [facility], per .   Report given to .  Belongings and medications given 
to R.N

. Family and or S/O informed of transfer.

## 2020-01-29 NOTE — NUR
NURSE NOTES:

MD ASTORGA HERE TO SEE PT. INFORMED PT EXTUBATED, ON HIGH FLOW O2 SATING 96%, URINE OUTPUT 
80ML/HR. TODAY LAB MG 1.6. MD WILL REVIEW. NO NEW ORDERS AT THIS TIME.

## 2020-01-29 NOTE — CARDIOLOGY PROGRESS NOTE
Assessment/Plan


Problem List:  


(1) Atrial flutter


(2) COPD exacerbation


(3) Elevated troponin


Status:  stable, progressing


Status Narrative


Pt w/ COPD, schizophrenia, and type II DM, adm w/ LOC in setting of low 

glucose. He had AFL w/ 1:1 conduction, ventricular rate 250 bpm in setting of 

resp failure, on vent. AFL resolved w/ iv metoprolol and digoxin. He is 

currently in NSR


ECHO w/ pulm hypertension, RV dilation, hypokinesis.   No evidence of PE on CTA 

and no DVT on venous duplex. 


He had mild troponin elevation following the AFL episode,  and EKG showing deep 

T inversion inf and anteriorly - c/w nonstemi.


Assessment/Plan


Continue b blockers and asa for AFL and CAD.


Continue management of COPD exacerbation per primary team.


Consider stress nuclear study to r/o ischemia once pt more stable.





Subjective


ROS Limited/Unobtainable:  Yes


Subjective


Cardiology for Dr. Murphy





Pt limited historian. Reports chest pain, but unable to give further details. 

Denies dyspnea





Objective





Last 24 Hour Vital Signs








  Date Time  Temp Pulse Resp B/P (MAP) Pulse Ox O2 Delivery O2 Flow Rate FiO2


 


1/29/20 18:54     93 High Flow 40.0 45


 


1/29/20 17:40    124/83    


 


1/29/20 16:00       40.0 45


 


1/29/20 16:00  65      


 


1/29/20 16:00 97.5 66 20 124/83 (97) 98   


 


1/29/20 16:00      Nasal Cannula 40.0 


 


1/29/20 15:22     95 High Flow 40.0 45


 


1/29/20 12:00       40.0 45


 


1/29/20 12:00  71      


 


1/29/20 12:00      Nasal Cannula 40.0 


 


1/29/20 11:44    108/85    


 


1/29/20 11:05     94 High Flow 40.0 45


 


1/29/20 11:00  71 22 122/82 (95) 97   


 


1/29/20 10:30  73 24 124/80 (95) 96   


 


1/29/20 10:00  72 18 122/83 (96) 96   


 


1/29/20 09:00  76 22 118/82 (94) 95   


 


1/29/20 08:30  78 20 126/86 (99) 94   


 


1/29/20 08:00      Nasal Cannula 40.0 


 


1/29/20 08:00 97.8 72 20 117/88 (98) 97   


 


1/29/20 08:00       40.0 45


 


1/29/20 08:00  79      


 


1/29/20 07:00  69 20 109/73 (85) 96   


 


1/29/20 06:45     95 High Flow 40.0 45


 


1/29/20 06:00  71 19 131/94 (106) 95   


 


1/29/20 05:29    121/89    


 


1/29/20 05:00  65 19 121/89 (100) 96   


 


1/29/20 04:00       40.0 45


 


1/29/20 04:00 98.1 67 18 128/92 (104) 95   


 


1/29/20 04:00      Nasal Cannula 40.0 


 


1/29/20 04:00  69      


 


1/29/20 03:04     94 High Flow 40.0 45


 


1/29/20 03:00  78 21 127/89 (102) 94   


 


1/29/20 02:00  61 19 123/91 (102) 96   


 


1/29/20 01:00  57 16 120/87 (98) 95   


 


1/29/20 00:00      Nasal Cannula 40.0 


 


1/29/20 00:00       40.0 45


 


1/29/20 00:00 98.0 64 13 114/71 (85) 95   


 


1/29/20 00:00  68      


 


1/28/20 23:01     95 High Flow 40.0 45


 


1/28/20 23:00  64 21 113/85 (94) 96   


 


1/28/20 22:00  68 17 120/89 (99) 95   


 


1/28/20 21:00  64 20 130/91 (104) 95   


 


1/28/20 20:00 97.9 66 20 127/96 (106) 95   


 


1/28/20 20:00  64      


 


1/28/20 20:00      Nasal Cannula 40.0 


 


1/28/20 20:00       40.0 45








General Appearance:  WD/WN, no apparent distress, other - on nc o2


EENT:  PERRL/EOMI


Neck:  non-tender, supple, no JVD


Rhythm:  NSR


Cardiovascular:  normal rate, regular rhythm, no gallop/murmur


Respiratory/Chest:  other -  R lower field rhonchi


Abdomen:  non tender, soft, no mass


Extremities:  no swelling


Pulses:  normal: PT (L), PT (R), DP (L), DP (R)











Intake and Output  


 


 1/28/20 1/29/20





 19:00 07:00


 


Intake Total 465 ml 300 ml


 


Output Total 900 ml 920 ml


 


Balance -435 ml -620 ml


 


  


 


Intake Oral 210 ml 300 ml


 


IV Total 255 ml 


 


Output Urine Total 900 ml 920 ml











Laboratory Tests








Test


  1/29/20


03:25


 


White Blood Count


  11.1 K/UL


(4.8-10.8)  H


 


Red Blood Count


  6.45 M/UL


(4.70-6.10)  H


 


Hemoglobin


  16.9 G/DL


(14.2-18.0)


 


Hematocrit


  55.0 %


(42.0-52.0)  H


 


Mean Corpuscular Volume 85 FL (80-99)  


 


Mean Corpuscular Hemoglobin


  26.2 PG


(27.0-31.0)  L


 


Mean Corpuscular Hemoglobin


Concent 30.7 G/DL


(32.0-36.0)  L


 


Red Cell Distribution Width


  17.8 %


(11.6-14.8)  H


 


Platelet Count


  142 K/UL


(150-450)  L


 


Mean Platelet Volume


  6.9 FL


(6.5-10.1)


 


Neutrophils (%) (Auto)


  % (45.0-75.0)


 


 


Lymphocytes (%) (Auto)


  % (20.0-45.0)


 


 


Monocytes (%) (Auto)  % (1.0-10.0)  


 


Eosinophils (%) (Auto)  % (0.0-3.0)  


 


Basophils (%) (Auto)  % (0.0-2.0)  


 


Sodium Level


  143 MMOL/L


(136-145)


 


Potassium Level


  4.5 MMOL/L


(3.5-5.1)


 


Chloride Level


  103 MMOL/L


()


 


Carbon Dioxide Level


  38 MMOL/L


(21-32)  H


 


Anion Gap


  2 mmol/L


(5-15)  L


 


Blood Urea Nitrogen


  26 mg/dL


(7-18)  H


 


Creatinine


  0.7 MG/DL


(0.55-1.30)


 


Estimat Glomerular Filtration


Rate > 60 mL/min


(>60)


 


Glucose Level


  172 MG/DL


()  H


 


Calcium Level


  8.2 MG/DL


(8.5-10.1)  L


 


Phosphorus Level


  3.6 MG/DL


(2.5-4.9)


 


Magnesium Level


  1.6 MG/DL


(1.8-2.4)  L

















Gallik,Moni Leigh MD Jan 29, 2020 19:41

## 2020-01-29 NOTE — NUR
NURSE NOTES:

Pt is sleeping on the bed and no sign of acute distress noted. Stable V/S. Will continue to 
monitor any change of condition.

## 2020-01-29 NOTE — PROGRESS NOTE
DATE:  01/27/2020

SUBJECTIVE:  The patient  much improved, calm,  able to answer

the questions.  The patient is improved.  Less agitated, 



MENTAL STATUS EXAMINATION:  The patient is alert and oriented to time,

self, and place.  Mood is neutral.  Affect is flat.  Thought process is

concrete.  Thought content, no suicidal or homicidal ideation.



ASSESSMENT:

1. Acute encephalopathy, improved.

2. Schizophrenia.



PLAN:

1. We will continue 

2. Risperidone 2 mg nightly.

3. Ativan was ordered by Danie Brown .

4. We will continue to follow and readjust the medications.









  ______________________________________________

  Leland Harris M.D.





DR:  ETSS

D:  01/27/2020 23:51

T:  01/28/2020 11:22

JOB#:  [id number]

CC:



ROLAND

## 2020-01-29 NOTE — NUR
NURSE NOTES:

Pt is resting on the bed and no sign of acute distress noted. Noted SaO2 95% with Highflow 
O2 40L. Denied pain at this time. No sign of hypo/hyperglycemic reaction. Repositioned. Keep 
Hernandez and drainage well. Provided good sleep environment. Placed fall precaution. Will 
continue to care plan.

## 2020-01-29 NOTE — PULMONOLOGY PROGRESS NOTE
Assessment/Plan


Assessment/Plan


IMPRESSION:


1. Hypoglycemia. Seen by endocrine; now resolved


2. Metabolic encephalopathy. Resolved


3. Chronic obstructive pulmonary disease with exacerbation. Now intubated 


4. Acute myocardial infarction.


5. Hyponatremia. Corrected


6.Acute on chronic congestive heart failure, possibly systolic and


diastolic.





PLAN:


1. Now extubated


2. Wean down O2; currently on Hi Flow


3. Anti-platelet therapy.


4. Off Fentanyl


5. Current present care


6. DVT prophylaxis.


7. Empiric antimicrobials.


8. Swallow eval








GI, endocrine and cardiology consults appreciated


Discussed psych medication regimen with Dr Harris














  ______________________________________________


True Duffy M.D.





Subjective


Interval Events:  None new


Constitutional:  Reports: no symptoms


HEENT:  Repors: no symptoms


Respiratory:  Reports: no symptoms


Cardiovascular:  Reports: no symptoms


Gastrointestinal/Abdominal:  Reports: no symptoms


Genitourinary:  Reports: no symptoms


Allergies:  


Coded Allergies:  


     No Known Allergies (Unverified , 1/16/18)





Objective





Last 24 Hour Vital Signs








  Date Time  Temp Pulse Resp B/P (MAP) Pulse Ox O2 Delivery O2 Flow Rate FiO2


 


1/29/20 09:00  76 22 118/82 (94) 95   


 


1/29/20 08:30  78 20 126/86 (99) 94   


 


1/29/20 08:00 97.8 72 20 117/88 (98) 97   


 


1/29/20 08:00       40.0 45


 


1/29/20 08:00  79      


 


1/29/20 07:00  69 20 109/73 (85) 96   


 


1/29/20 06:45     95 High Flow 40.0 45


 


1/29/20 06:00  71 19 131/94 (106) 95   


 


1/29/20 05:29    121/89    


 


1/29/20 05:00  65 19 121/89 (100) 96   


 


1/29/20 04:00       40.0 45


 


1/29/20 04:00 98.1 67 18 128/92 (104) 95   


 


1/29/20 04:00      Nasal Cannula 40.0 


 


1/29/20 04:00  69      


 


1/29/20 03:04     94 High Flow 40.0 45


 


1/29/20 03:00  78 21 127/89 (102) 94   


 


1/29/20 02:00  61 19 123/91 (102) 96   


 


1/29/20 01:00  57 16 120/87 (98) 95   


 


1/29/20 00:00      Nasal Cannula 40.0 


 


1/29/20 00:00       40.0 45


 


1/29/20 00:00 98.0 64 13 114/71 (85) 95   


 


1/29/20 00:00  68      


 


1/28/20 23:01     95 High Flow 40.0 45


 


1/28/20 23:00  64 21 113/85 (94) 96   


 


1/28/20 22:00  68 17 120/89 (99) 95   


 


1/28/20 21:00  64 20 130/91 (104) 95   


 


1/28/20 20:00 97.9 66 20 127/96 (106) 95   


 


1/28/20 20:00  64      


 


1/28/20 20:00      Nasal Cannula 40.0 


 


1/28/20 20:00       40.0 45


 


1/28/20 19:02     94 High Flow 40.0 45


 


1/28/20 19:00  69 21 121/90 (100) 94   


 


1/28/20 18:57    126/94    


 


1/28/20 18:00  74 20 108/57 (74) 100   


 


1/28/20 17:00  76 23 131/95 (107) 93   


 


1/28/20 16:00       40.0 45


 


1/28/20 16:00      Mechanical Ventilator  


 


1/28/20 16:00 98.2 73 20 127/96 (106) 94   


 


1/28/20 16:00  72      


 


1/28/20 15:16     93 High Flow 40.0 45


 


1/28/20 15:00  73 18 126/94 (105) 93   


 


1/28/20 14:00  73 16 126/88 (101) 94   


 


1/28/20 13:00  79 19 122/85 (97) 92   


 


1/28/20 12:00       40.0 45


 


1/28/20 12:00  73      


 


1/28/20 12:00 98.5 71 18 134/95 (108) 94   


 


1/28/20 12:00      Mechanical Ventilator  


 


1/28/20 11:39    129/93    


 


1/28/20 11:00  73 23 124/91 (102) 93   


 


1/28/20 10:39     94 High Flow 40.0 45


 


1/28/20 10:00  76 19 131/95 (107) 94   

















Intake and Output  


 


 1/28/20 1/29/20





 19:00 07:00


 


Intake Total 465 ml 300 ml


 


Output Total 900 ml 920 ml


 


Balance -435 ml -620 ml


 


  


 


Intake Oral 210 ml 300 ml


 


IV Total 255 ml 


 


Output Urine Total 900 ml 920 ml








General Appearance:  no acute distress


HEENT:  normocephalic


Respiratory/Chest:  chest wall non-tender, lungs clear


Cardiovascular:  normal peripheral pulses, normal rate


Abdomen:  normal bowel sounds


Laboratory Tests


1/28/20 09:58: 


Arterial Blood pH 7.414, Arterial Blood Partial Pressure CO2 65.1*H, Arterial 

Blood Partial Pressure O2 67.5L, Arterial Blood HCO3 40.7*H, Arterial Blood 

Oxygen Saturation 93.3L, Arterial Blood Base Excess 12.5*H, Frandy Test Positive


1/29/20 03:25: 


White Blood Count 11.1H, Red Blood Count 6.45H, Hemoglobin 16.9, Hematocrit 

55.0H, Mean Corpuscular Volume 85, Mean Corpuscular Hemoglobin 26.2L, Mean 

Corpuscular Hemoglobin Concent 30.7L, Red Cell Distribution Width 17.8H, 

Platelet Count 142L, Mean Platelet Volume 6.9, Neutrophils (%) (Auto) , 

Lymphocytes (%) (Auto) , Monocytes (%) (Auto) , Eosinophils (%) (Auto) , 

Basophils (%) (Auto) , Sodium Level 143, Potassium Level 4.5, Chloride Level 103

, Carbon Dioxide Level 38H, Anion Gap 2L, Blood Urea Nitrogen 26H, Creatinine 

0.7, Estimat Glomerular Filtration Rate > 60, Glucose Level 172H, Calcium Level 

8.2L, Phosphorus Level 3.6, Magnesium Level 1.6L





Current Medications








 Medications


  (Trade)  Dose


 Ordered  Sig/German


 Route


 PRN Reason  Start Time


 Stop Time Status Last Admin


Dose Admin


 


 Acetaminophen


  (Tylenol)  650 mg  Q6H  PRN


 ORAL


 Mild Pain/Temp > 100.5  1/27/20 13:30


 2/21/20 13:29   


 


 


 Aspirin


  (ASA)  81 mg  DAILY


 ORAL


   1/28/20 09:00


 2/21/20 08:59  1/29/20 08:32


 


 


 Ceftriaxone


 Sodium 1 gm/


 Sodium Chloride  55 ml @ 


 110 mls/hr  Q24H


 IVPB


   1/27/20 16:00


 1/30/20 15:59  1/28/20 15:52


 


 


 Dextrose


  (Dextrose 50%)  25 ml  Q30M  PRN


 IV


 Hypoglycemia  1/27/20 12:45


 2/23/20 07:14   


 


 


 Dextrose


  (Dextrose 50%)  50 ml  Q30M  PRN


 IV


 Hypoglycemia  1/27/20 12:45


 2/23/20 07:14   


 


 


 Guaifenesin/


 Dextromethorphan


  (Robitussin DM


 Syrup)  5 ml  Q6H  PRN


 ORAL


 For Cough  1/27/20 13:30


 2/21/20 13:29   


 


 


 Haloperidol


 Lactate


  (Haldol)  5 mg  Q4H  PRN


 IM


 Agitation  1/27/20 13:00


 2/22/20 12:59   


 


 


 Insulin Aspart


  (NovoLOG)    BEFORE MEALS AND  HS


 SUBQ


   1/27/20 16:30


 2/23/20 20:59  1/29/20 06:18


 


 


 Insulin Detemir


  (Levemir)  10 units  Q12HR


 SUBQ


   1/27/20 21:00


 2/25/20 20:59  1/29/20 08:34


 


 


 Iohexol


  (Omnipaque)  100 mg  NOW  PRN


 INJ


 Radiology Procedure  1/28/20 12:15


 1/31/20 12:07   


 


 


 Lorazepam


  (Ativan 2mg/ml


 1ml)  2 mg  Q2H  PRN


 IV


 For Anxiety  1/27/20 13:15


 1/30/20 17:14   


 


 


 Methylprednisolone


 Sodium Succinate


  (Solu-MEDROL)  40 mg  EVERY 8  HOURS


 IVP


   1/27/20 14:00


 2/25/20 12:59  1/29/20 05:30


 


 


 Morphine Sulfate


  (Morphine


 Sulfate)  2 mg  Q2H  PRN


 IVP


 For Pain  1/27/20 13:15


 1/30/20 17:14   


 


 


 Nitroglycerin


  (Nitro-Bid)  1 inch  TID@0600,1200,1800


 TOPIC


   1/27/20 18:00


 2/26/20 05:59  1/29/20 05:29


 


 


 Pantoprazole


  (Protonix)  40 mg  EVERY 12  HOURS


 IVP


   1/27/20 21:00


 2/22/20 20:59  1/29/20 08:32


 


 


 Risperidone


  (RisperDAL)  2 mg  QHS


 ORAL


   1/27/20 21:00


 2/21/20 20:59  1/28/20 20:59


 


 


 Trazodone HCl


  (Desyrel)  100 mg  BEDTIME


 ORAL


   1/27/20 21:00


 2/21/20 20:59  1/28/20 20:59


 

















True Duffy MD Jan 29, 2020 09:22

## 2020-01-29 NOTE — GENERAL PROGRESS NOTE
Assessment/Plan


Problem List:  


(1) Diabetes mellitus


ICD Codes:  E11.9 - Type 2 diabetes mellitus without complications


SNOMED:  25229049


(2) Hypoglycemia


ICD Codes:  E16.2 - Hypoglycemia, unspecified


SNOMED:  611091558


(3) Schizophrenia


ICD Codes:  F20.9 - Schizophrenia, unspecified


SNOMED:  30355129


Qualifiers:  


   Qualified Codes:  F20.9 - Schizophrenia, unspecified


(4) Elevated troponin


ICD Codes:  R79.89 - Other specified abnormal findings of blood chemistry


SNOMED:  228598221, 965076926, 464009586


(5) COPD exacerbation


ICD Codes:  J44.1 - Chronic obstructive pulmonary disease with (acute) 

exacerbation


SNOMED:  914743254


(6) Abnormal LFTs


ICD Codes:  R94.5 - Abnormal results of liver function studies


SNOMED:  868488402


Status:  progressing


Assessment/Plan:


continue Levemir 10 units bid  


continue NISS ac / hs





Subjective


Allergies:  


Coded Allergies:  


     No Known Allergies (Unverified , 1/16/18)


All Systems:  reviewed and negative except above


Subjective


events noted 


transferred out of ICU 











Item Value  Date Time


 


Bedside Blood Glucose 159 mg/dl H 1/29/20 1734


 


Bedside Blood Glucose 201 mg/dl H 1/29/20 1143


 


Bedside Blood Glucose 158 mg/dl H 1/29/20 0834


 


Bedside Blood Glucose 175 mg/dl H 1/29/20 0630


 


Bedside Blood Glucose 135 mg/dl H 1/28/20 2101











Objective





Last 24 Hour Vital Signs








  Date Time  Temp Pulse Resp B/P (MAP) Pulse Ox O2 Delivery O2 Flow Rate FiO2


 


1/29/20 17:40    124/83    


 


1/29/20 16:00       40.0 45


 


1/29/20 16:00  65      


 


1/29/20 16:00 97.5 66 20 124/83 (97) 98   


 


1/29/20 16:00      Nasal Cannula 40.0 


 


1/29/20 15:22     95 High Flow 40.0 45


 


1/29/20 12:00       40.0 45


 


1/29/20 12:00  71      


 


1/29/20 12:00      Nasal Cannula 40.0 


 


1/29/20 11:44    108/85    


 


1/29/20 11:05     94 High Flow 40.0 45


 


1/29/20 11:00  71 22 122/82 (95) 97   


 


1/29/20 10:30  73 24 124/80 (95) 96   


 


1/29/20 10:00  72 18 122/83 (96) 96   


 


1/29/20 09:00  76 22 118/82 (94) 95   


 


1/29/20 08:30  78 20 126/86 (99) 94   


 


1/29/20 08:00      Nasal Cannula 40.0 


 


1/29/20 08:00 97.8 72 20 117/88 (98) 97   


 


1/29/20 08:00       40.0 45


 


1/29/20 08:00  79      


 


1/29/20 07:00  69 20 109/73 (85) 96   


 


1/29/20 06:45     95 High Flow 40.0 45


 


1/29/20 06:00  71 19 131/94 (106) 95   


 


1/29/20 05:29    121/89    


 


1/29/20 05:00  65 19 121/89 (100) 96   


 


1/29/20 04:00       40.0 45


 


1/29/20 04:00 98.1 67 18 128/92 (104) 95   


 


1/29/20 04:00      Nasal Cannula 40.0 


 


1/29/20 04:00  69      


 


1/29/20 03:04     94 High Flow 40.0 45


 


1/29/20 03:00  78 21 127/89 (102) 94   


 


1/29/20 02:00  61 19 123/91 (102) 96   


 


1/29/20 01:00  57 16 120/87 (98) 95   


 


1/29/20 00:00      Nasal Cannula 40.0 


 


1/29/20 00:00       40.0 45


 


1/29/20 00:00 98.0 64 13 114/71 (85) 95   


 


1/29/20 00:00  68      


 


1/28/20 23:01     95 High Flow 40.0 45


 


1/28/20 23:00  64 21 113/85 (94) 96   


 


1/28/20 22:00  68 17 120/89 (99) 95   


 


1/28/20 21:00  64 20 130/91 (104) 95   


 


1/28/20 20:00 97.9 66 20 127/96 (106) 95   


 


1/28/20 20:00  64      


 


1/28/20 20:00      Nasal Cannula 40.0 


 


1/28/20 20:00       40.0 45


 


1/28/20 19:02     94 High Flow 40.0 45


 


1/28/20 19:00  69 21 121/90 (100) 94   


 


1/28/20 18:57    126/94    

















Intake and Output  


 


 1/28/20 1/29/20





 19:00 07:00


 


Intake Total 465 ml 300 ml


 


Output Total 900 ml 920 ml


 


Balance -435 ml -620 ml


 


  


 


Intake Oral 210 ml 300 ml


 


IV Total 255 ml 


 


Output Urine Total 900 ml 920 ml








Laboratory Tests


1/29/20 03:25: 


White Blood Count 11.1H, Red Blood Count 6.45H, Hemoglobin 16.9, Hematocrit 

55.0H, Mean Corpuscular Volume 85, Mean Corpuscular Hemoglobin 26.2L, Mean 

Corpuscular Hemoglobin Concent 30.7L, Red Cell Distribution Width 17.8H, 

Platelet Count 142L, Mean Platelet Volume 6.9, Neutrophils (%) (Auto) , 

Lymphocytes (%) (Auto) , Monocytes (%) (Auto) , Eosinophils (%) (Auto) , 

Basophils (%) (Auto) , Sodium Level 143, Potassium Level 4.5, Chloride Level 103

, Carbon Dioxide Level 38H, Anion Gap 2L, Blood Urea Nitrogen 26H, Creatinine 

0.7, Estimat Glomerular Filtration Rate > 60, Glucose Level 172H, Calcium Level 

8.2L, Phosphorus Level 3.6, Magnesium Level 1.6L


Height (Feet):  6


Height (Inches):  3.00


Weight (Pounds):  235


General Appearance:  no apparent distress


Neck:  normal alignment


Cardiovascular:  normal rate


Respiratory/Chest:  decreased breath sounds


Abdomen:  normal bowel sounds


Edema:  no edema noted Arm (L), no edema noted Arm (R), no edema noted Leg (L), 

no edema noted Leg (R), no edema noted Pedal (L), no edema noted Pedal (R), no 

edema noted Generalized


Objective





Current Medications








 Medications


  (Trade)  Dose


 Ordered  Sig/German


 Route


 PRN Reason  Start Time


 Stop Time Status Last Admin


Dose Admin


 


 Acetaminophen


  (Tylenol)  650 mg  Q6H  PRN


 ORAL


 Mild Pain/Temp > 100.5  1/29/20 12:54


 2/28/20 12:53   


 


 


 Aspirin


  (ASA)  81 mg  DAILY


 ORAL


   1/30/20 09:00


 2/21/20 08:59   


 


 


 Ceftriaxone


 Sodium 1 gm/


 Sodium Chloride  55 ml @ 


 110 mls/hr  Q24H


 IVPB


   1/29/20 16:00


 1/30/20 15:59  1/29/20 17:24


 


 


 Dextrose


  (Dextrose 50%)  25 ml  Q30M  PRN


 IV


 Hypoglycemia  1/29/20 12:45


 2/23/20 07:14   


 


 


 Dextrose


  (Dextrose 50%)  50 ml  Q30M  PRN


 IV


 Hypoglycemia  1/29/20 12:45


 2/23/20 07:14   


 


 


 Docusate Sodium


  (Colace)  100 mg  DAILY


 ORAL


   1/30/20 09:00


 2/29/20 08:59   


 


 


 Guaifenesin/


 Dextromethorphan


  (Robitussin DM


 Syrup)  5 ml  Q6H  PRN


 ORAL


 For Cough  1/29/20 12:54


 2/28/20 12:53   


 


 


 Haloperidol


 Lactate


  (Haldol)  5 mg  Q4H  PRN


 IM


 Agitation  1/29/20 12:54


 2/28/20 12:53   


 


 


 Insulin Aspart


  (NovoLOG)    BEFORE MEALS AND  HS


 SUBQ


   1/29/20 16:30


 2/23/20 20:59  1/29/20 17:34


 


 


 Insulin Detemir


  (Levemir)  10 units  Q12HR


 SUBQ


   1/29/20 21:00


 2/25/20 20:59   


 


 


 Iohexol


  (Omnipaque)  100 mg  NOW  PRN


 INJ


 Radiology Procedure  1/29/20 12:55


 1/30/20 12:54   


 


 


 Lorazepam


  (Ativan 2mg/ml


 1ml)  2 mg  Q2H  PRN


 IV


 For Anxiety  1/29/20 12:55


 2/5/20 12:54   


 


 


 Methylprednisolone


 Sodium Succinate


  (Solu-MEDROL)  40 mg  EVERY 8  HOURS


 IVP


   1/29/20 14:00


 2/25/20 12:59  1/29/20 13:49


 


 


 Morphine Sulfate


  (Morphine


 Sulfate)  2 mg  Q2H  PRN


 IVP


 For Pain  1/29/20 12:55


 2/5/20 12:54   


 


 


 Nitroglycerin


  (Nitro-Bid)  1 inch  TID@0600,1200,1800


 TOPIC


   1/29/20 18:00


 2/26/20 05:59  1/29/20 17:40


 


 


 Pantoprazole


  (Protonix)  40 mg  EVERY 12  HOURS


 IVP


   1/29/20 21:00


 2/22/20 20:59   


 


 


 Polyethylene


 Glycol


  (Miralax)  17 gm  DAILY


 ORAL


   1/30/20 09:00


 2/29/20 08:59   


 


 


 Risperidone


  (RisperDAL)  2 mg  QHS


 ORAL


   1/29/20 21:00


 2/21/20 20:59   


 


 


 Trazodone HCl


  (Desyrel)  100 mg  BEDTIME


 ORAL


   1/29/20 21:00


 2/21/20 20:59   


 

















Nathen Joiner MD Jan 29, 2020 18:50

## 2020-01-29 NOTE — GENERAL PROGRESS NOTE
Assessment/Plan


Status:  progressing


Assessment/Plan:


Assessment/Plan


Problems:  


(1) Abnormal LFTs


ICD Codes:  R94.5 - Abnormal results of liver function studies


SNOMED:  712638124


(2) COPD exacerbation


ICD Codes:  J44.1 - Chronic obstructive pulmonary disease with (acute) 

exacerbation


SNOMED:  521842483


(3) Elevated troponin


ICD Codes:  R79.89 - Other specified abnormal findings of blood chemistry


SNOMED:  003260355, 148941649, 489329809


(4) Diabetes mellitus





Assessment/Plan


Abdominal US reviewed >> Cholelithiasis. Hepatosplenomegaly.  Some degree of 

portal hypertension not excluded. Trace ascites


We will obtain hepatitis panel to rule out any viral hepatitis >> negative


Elevated LFTs, downtrending





advance diet as tolerated


hold statins


on NC


ppi


prn transfusion


repeat liver function tests for tomorrow


will follow on a daily basis with additional recommendations


recommend outpatient fibroid scan to grade cirrhosis





Subjective


ROS Limited/Unobtainable:  No


Allergies:  


Coded Allergies:  


     No Known Allergies (Unverified , 1/16/18)





Objective





Last 24 Hour Vital Signs








  Date Time  Temp Pulse Resp B/P (MAP) Pulse Ox O2 Delivery O2 Flow Rate FiO2


 


1/29/20 11:44    108/85    


 


1/29/20 10:30  73 24 124/80 (95) 96   


 


1/29/20 10:00  72 18 122/83 (96) 96   


 


1/29/20 09:00  76 22 118/82 (94) 95   


 


1/29/20 08:30  78 20 126/86 (99) 94   


 


1/29/20 08:00      Nasal Cannula 40.0 


 


1/29/20 08:00 97.8 72 20 117/88 (98) 97   


 


1/29/20 08:00       40.0 45


 


1/29/20 08:00  79      


 


1/29/20 07:00  69 20 109/73 (85) 96   


 


1/29/20 06:45     95 High Flow 40.0 45


 


1/29/20 06:00  71 19 131/94 (106) 95   


 


1/29/20 05:29    121/89    


 


1/29/20 05:00  65 19 121/89 (100) 96   


 


1/29/20 04:00       40.0 45


 


1/29/20 04:00 98.1 67 18 128/92 (104) 95   


 


1/29/20 04:00      Nasal Cannula 40.0 


 


1/29/20 04:00  69      


 


1/29/20 03:04     94 High Flow 40.0 45


 


1/29/20 03:00  78 21 127/89 (102) 94   


 


1/29/20 02:00  61 19 123/91 (102) 96   


 


1/29/20 01:00  57 16 120/87 (98) 95   


 


1/29/20 00:00      Nasal Cannula 40.0 


 


1/29/20 00:00       40.0 45


 


1/29/20 00:00 98.0 64 13 114/71 (85) 95   


 


1/29/20 00:00  68      


 


1/28/20 23:01     95 High Flow 40.0 45


 


1/28/20 23:00  64 21 113/85 (94) 96   


 


1/28/20 22:00  68 17 120/89 (99) 95   


 


1/28/20 21:00  64 20 130/91 (104) 95   


 


1/28/20 20:00 97.9 66 20 127/96 (106) 95   


 


1/28/20 20:00  64      


 


1/28/20 20:00      Nasal Cannula 40.0 


 


1/28/20 20:00       40.0 45


 


1/28/20 19:02     94 High Flow 40.0 45


 


1/28/20 19:00  69 21 121/90 (100) 94   


 


1/28/20 18:57    126/94    


 


1/28/20 18:00  74 20 108/57 (74) 100   


 


1/28/20 17:00  76 23 131/95 (107) 93   


 


1/28/20 16:00       40.0 45


 


1/28/20 16:00      Mechanical Ventilator  


 


1/28/20 16:00 98.2 73 20 127/96 (106) 94   


 


1/28/20 16:00  72      


 


1/28/20 15:16     93 High Flow 40.0 45


 


1/28/20 15:00  73 18 126/94 (105) 93   


 


1/28/20 14:00  73 16 126/88 (101) 94   


 


1/28/20 13:00  79 19 122/85 (97) 92   


 


1/28/20 12:00       40.0 45


 


1/28/20 12:00  73      


 


1/28/20 12:00 98.5 71 18 134/95 (108) 94   


 


1/28/20 12:00      Mechanical Ventilator  

















Intake and Output  


 


 1/28/20 1/29/20





 19:00 07:00


 


Intake Total 465 ml 300 ml


 


Output Total 900 ml 920 ml


 


Balance -435 ml -620 ml


 


  


 


Intake Oral 210 ml 300 ml


 


IV Total 255 ml 


 


Output Urine Total 900 ml 920 ml








Laboratory Tests


1/29/20 03:25: 


White Blood Count 11.1H, Red Blood Count 6.45H, Hemoglobin 16.9, Hematocrit 

55.0H, Mean Corpuscular Volume 85, Mean Corpuscular Hemoglobin 26.2L, Mean 

Corpuscular Hemoglobin Concent 30.7L, Red Cell Distribution Width 17.8H, 

Platelet Count 142L, Mean Platelet Volume 6.9, Neutrophils (%) (Auto) , 

Lymphocytes (%) (Auto) , Monocytes (%) (Auto) , Eosinophils (%) (Auto) , 

Basophils (%) (Auto) , Sodium Level 143, Potassium Level 4.5, Chloride Level 103

, Carbon Dioxide Level 38H, Anion Gap 2L, Blood Urea Nitrogen 26H, Creatinine 

0.7, Estimat Glomerular Filtration Rate > 60, Glucose Level 172H, Calcium Level 

8.2L, Phosphorus Level 3.6, Magnesium Level 1.6L


Height (Feet):  6


Height (Inches):  3.00


Weight (Pounds):  235


General Appearance:  lethargic


EENT:  TMs normal


Neck:  supple


Cardiovascular:  normal rate


Respiratory/Chest:  decreased breath sounds


Abdomen:  normal bowel sounds, non tender, soft











Camron Reeves MD Jan 29, 2020 11:47

## 2020-01-29 NOTE — NUR
NURSE NOTES:

LATE ENTRY:

MD PERKINS HERE TO SEE PT.  INFORMED MD THAT PT HAD LAST BM 1/27. HYPERACTIVE BOWEL SOUNDS. 
DIET CCHO MEDIUM W/ NECTAR THICK, PT EAT 25% OF BREAKFAST. RECEIVED ORDER FOR COLACE 100MG 
DAILY. MIRALAX 17GM DAILY.

## 2020-01-29 NOTE — NUR
NURSE NOTES:

pt unable to swallow and pt spit out the Desyrel. wasted medication, charge nurse See Rivers. will continue to monitor pt.

## 2020-01-29 NOTE — NUR
SI;    RESP FAILURE S/P EXTUBATION

T. 97.8 HR 72 RR 20 B/P 109/73

HIGH FLOW NC 45%

CO2 38 BUN 26 MG 1.6

PH 7.41 PCO2 65.1 PO2 67.5 HCO3 40.7 O2 SAT 93.3







IS:     MAGNESIUM IV

ROCEPHIN IV

SOLU MEDROL IV

PROTONIX IV

*******ICU STATUS*******

## 2020-01-29 NOTE — NUR
NURSE NOTES:

Morning care was done. Cleaned PT and applied lotion and cream. No open wound noted. On 
highflow oxygen 40L FiO2 45% and SaO2 95% noted. Repositioned. Placed fall precaution. Will 
continue to care plan.

## 2020-01-29 NOTE — NUR
RD ASSESSMENT & RECOMMENDATIONS

SEE CARE ACTIVITY FOR COMPLETE ASSESSMENT



DAILY ESTIMATED NEEDS:

Needs based on obesity, DM, Pulmonary, obesity 91.8kg  

20-25  kcals/kg 

[phone]  total kcals

1-1.5  g protein/kg

  g total protein 

20-30ml/kcal   mL/kg

0560-4829  total fluid mLs



NUTRITION DIAGNOSIS:

* Swallowing difficulty R/T respiratory status as evidenced by pt orally

intubated and sedated, now extubated on puree texture diet w/ NTL.

* Altered nutrition related lab values R/T diabetes, liver dysfunction,

clinical status as evidenced by hypoglycemia and hyperglycemia (POC glu

251 [phone]), low mg (1.2), low Phos (1.0), elev T bili and LFTs.





CURRENT DIET: Cardiac/ CCHO MED/ Renal puree w/ NTL     





PO DIET RECOMMENDATIONS:

SLP eval post extubation -> CARDIAC, CCHO MED 



 

ADDITIONAL RECOMMENDATIONS:

1) Calibrated bedscale wt 

2) Monitor for hypoglycemia while pt is NPO 

3) Monitor lytes, replete as needed 

4) W/ variable po intake, rec Glucerna 1 tetra christina BID  

     -> monitor renal labs, need for renal diet restrictions

## 2020-01-29 NOTE — NUR
NURSE NOTES:

Pt is resting on the bed and no sign of acute distress noted. Tolerated well with current 
Oxygen setting. Repositioned. Denied pain at this time. Placed fall precaution. Will 
continue to monitor any change of condition.

## 2020-01-29 NOTE — NUR
NURSE NOTES:

pt unable to swallow and pt spit out the Desyrel. will continue to monitor pt.

-------------------------------------------------------------------------------

Addendum: 01/30/20 at 2012 by SALIMA SANTANA RN

-------------------------------------------------------------------------------

duplicated

## 2020-01-30 VITALS — SYSTOLIC BLOOD PRESSURE: 121 MMHG | DIASTOLIC BLOOD PRESSURE: 83 MMHG

## 2020-01-30 VITALS — SYSTOLIC BLOOD PRESSURE: 152 MMHG | DIASTOLIC BLOOD PRESSURE: 91 MMHG

## 2020-01-30 VITALS — DIASTOLIC BLOOD PRESSURE: 97 MMHG | SYSTOLIC BLOOD PRESSURE: 133 MMHG

## 2020-01-30 VITALS — SYSTOLIC BLOOD PRESSURE: 110 MMHG | DIASTOLIC BLOOD PRESSURE: 75 MMHG

## 2020-01-30 VITALS — DIASTOLIC BLOOD PRESSURE: 87 MMHG | SYSTOLIC BLOOD PRESSURE: 129 MMHG

## 2020-01-30 RX ADMIN — NITROGLYCERIN SCH INCH: 20 OINTMENT TOPICAL at 12:55

## 2020-01-30 RX ADMIN — TRAZODONE HYDROCHLORIDE SCH MG: 100 TABLET ORAL at 21:01

## 2020-01-30 RX ADMIN — PANTOPRAZOLE SODIUM SCH MG: 40 INJECTION, POWDER, FOR SOLUTION INTRAVENOUS at 08:33

## 2020-01-30 RX ADMIN — INSULIN ASPART SCH UNITS: 100 INJECTION, SOLUTION INTRAVENOUS; SUBCUTANEOUS at 17:07

## 2020-01-30 RX ADMIN — INSULIN DETEMIR SCH UNITS: 100 INJECTION, SOLUTION SUBCUTANEOUS at 08:43

## 2020-01-30 RX ADMIN — INSULIN ASPART SCH UNITS: 100 INJECTION, SOLUTION INTRAVENOUS; SUBCUTANEOUS at 21:03

## 2020-01-30 RX ADMIN — INSULIN ASPART SCH UNITS: 100 INJECTION, SOLUTION INTRAVENOUS; SUBCUTANEOUS at 12:05

## 2020-01-30 RX ADMIN — NITROGLYCERIN SCH INCH: 20 OINTMENT TOPICAL at 17:12

## 2020-01-30 RX ADMIN — INSULIN ASPART SCH UNITS: 100 INJECTION, SOLUTION INTRAVENOUS; SUBCUTANEOUS at 06:30

## 2020-01-30 RX ADMIN — NITROGLYCERIN SCH INCH: 20 OINTMENT TOPICAL at 06:33

## 2020-01-30 RX ADMIN — DOCUSATE SODIUM SCH MG: 100 CAPSULE, LIQUID FILLED ORAL at 08:32

## 2020-01-30 RX ADMIN — INSULIN DETEMIR SCH UNITS: 100 INJECTION, SOLUTION SUBCUTANEOUS at 21:03

## 2020-01-30 RX ADMIN — METHYLPREDNISOLONE SODIUM SUCCINATE SCH MG: 40 INJECTION, POWDER, LYOPHILIZED, FOR SOLUTION INTRAMUSCULAR; INTRAVENOUS at 14:48

## 2020-01-30 RX ADMIN — ASPIRIN 81 MG SCH MG: 81 TABLET ORAL at 08:32

## 2020-01-30 RX ADMIN — POLYETHYLENE GLYCOL 3350 SCH GM: 17 POWDER, FOR SOLUTION ORAL at 08:32

## 2020-01-30 RX ADMIN — METHYLPREDNISOLONE SODIUM SUCCINATE SCH MG: 40 INJECTION, POWDER, LYOPHILIZED, FOR SOLUTION INTRAMUSCULAR; INTRAVENOUS at 06:33

## 2020-01-30 NOTE — GENERAL PROGRESS NOTE
Assessment/Plan


Problem List:  


(1) Diabetes mellitus


ICD Codes:  E11.9 - Type 2 diabetes mellitus without complications


SNOMED:  85568903


(2) Hypoglycemia


ICD Codes:  E16.2 - Hypoglycemia, unspecified


SNOMED:  038203326


(3) Schizophrenia


ICD Codes:  F20.9 - Schizophrenia, unspecified


SNOMED:  91639562


Qualifiers:  


   Qualified Codes:  F20.9 - Schizophrenia, unspecified


(4) Elevated troponin


ICD Codes:  R79.89 - Other specified abnormal findings of blood chemistry


SNOMED:  173928054, 799286678, 458777231


(5) COPD exacerbation


ICD Codes:  J44.1 - Chronic obstructive pulmonary disease with (acute) 

exacerbation


SNOMED:  204810003


(6) Abnormal LFTs


ICD Codes:  R94.5 - Abnormal results of liver function studies


SNOMED:  035638909


Status:  stable, progressing


Assessment/Plan:


continue Levemir 10 units bid  


continue NISS ac / hs





Subjective


Allergies:  


Coded Allergies:  


     No Known Allergies (Unverified , 1/16/18)


Subjective


events noted 


glucose values improve without hypoglycemia 











Item Value  Date Time


 


Bedside Blood Glucose 144 mg/dl H 1/30/20 0630


 


Bedside Blood Glucose 146 mg/dl H 1/29/20 2153


 


Bedside Blood Glucose 159 mg/dl H 1/29/20 1734


 


Bedside Blood Glucose 201 mg/dl H 1/29/20 1143


 


Bedside Blood Glucose 158 mg/dl H 1/29/20 0834


 


Bedside Blood Glucose 175 mg/dl H 1/29/20 0630











Objective





Last 24 Hour Vital Signs








  Date Time  Temp Pulse Resp B/P (MAP) Pulse Ox O2 Delivery O2 Flow Rate FiO2


 


1/30/20 06:53     93 High Flow 40.0 50


 


1/30/20 06:33    110/75    


 


1/30/20 04:00 97.1 60 24 110/75 (87) 93   


 


1/30/20 04:00       40.0 45


 


1/30/20 04:00      Nasal Cannula 40.0 


 


1/30/20 03:15     93 High Flow 40.0 45


 


1/30/20 00:00 97.3 66 20 152/91 (111) 96   


 


1/30/20 00:00      Nasal Cannula 40.0 


 


1/30/20 00:00       40.0 45


 


1/29/20 23:23  56      


 


1/29/20 23:00     95 High Flow 40.0 45


 


1/29/20 20:00       40.0 45


 


1/29/20 20:00      Nasal Cannula 40.0 


 


1/29/20 20:00 97.0 66 20 124/73 (90) 95   


 


1/29/20 19:07  60      


 


1/29/20 18:54     93 High Flow 40.0 45


 


1/29/20 17:40    124/83    


 


1/29/20 16:00       40.0 45


 


1/29/20 16:00  65      


 


1/29/20 16:00 97.5 66 20 124/83 (97) 98   


 


1/29/20 16:00      Nasal Cannula 40.0 


 


1/29/20 15:22     95 High Flow 40.0 45


 


1/29/20 12:00       40.0 45


 


1/29/20 12:00  71      


 


1/29/20 12:00      Nasal Cannula 40.0 


 


1/29/20 11:44    108/85    


 


1/29/20 11:05     94 High Flow 40.0 45


 


1/29/20 11:00  71 22 122/82 (95) 97   


 


1/29/20 10:30  73 24 124/80 (95) 96   


 


1/29/20 10:00  72 18 122/83 (96) 96   


 


1/29/20 09:00  76 22 118/82 (94) 95   


 


1/29/20 08:30  78 20 126/86 (99) 94   


 


1/29/20 08:00      Nasal Cannula 40.0 


 


1/29/20 08:00 97.8 72 20 117/88 (98) 97   


 


1/29/20 08:00       40.0 45


 


1/29/20 08:00  79      


 


1/29/20 07:00  69 20 109/73 (85) 96   

















Intake and Output  


 


 1/29/20 1/30/20





 19:00 07:00


 


Intake Total 145 ml 


 


Output Total 1265 ml 


 


Balance -1120 ml 


 


  


 


Intake Oral 90 ml 


 


IV Total 55 ml 


 


Output Urine Total 1265 ml 








Height (Feet):  6


Height (Inches):  3.00


Weight (Pounds):  235


General Appearance:  no apparent distress


Neck:  normal alignment


Cardiovascular:  normal rate


Respiratory/Chest:  lungs clear


Abdomen:  normal bowel sounds


Objective





Current Medications








 Medications


  (Trade)  Dose


 Ordered  Sig/German


 Route


 PRN Reason  Start Time


 Stop Time Status Last Admin


Dose Admin


 


 Acetaminophen


  (Tylenol)  650 mg  Q6H  PRN


 ORAL


 Mild Pain/Temp > 100.5  1/29/20 12:54


 2/28/20 12:53   


 


 


 Aspirin


  (ASA)  81 mg  DAILY


 ORAL


   1/30/20 09:00


 2/21/20 08:59   


 


 


 Ceftriaxone


 Sodium 1 gm/


 Sodium Chloride  55 ml @ 


 110 mls/hr  Q24H


 IVPB


   1/29/20 16:00


 1/30/20 15:59  1/29/20 17:24


 


 


 Dextrose


  (Dextrose 50%)  25 ml  Q30M  PRN


 IV


 Hypoglycemia  1/29/20 12:45


 2/23/20 07:14   


 


 


 Dextrose


  (Dextrose 50%)  50 ml  Q30M  PRN


 IV


 Hypoglycemia  1/29/20 12:45


 2/23/20 07:14   


 


 


 Docusate Sodium


  (Colace)  100 mg  DAILY


 ORAL


   1/30/20 09:00


 2/29/20 08:59   


 


 


 Guaifenesin/


 Dextromethorphan


  (Robitussin DM


 Syrup)  5 ml  Q6H  PRN


 ORAL


 For Cough  1/29/20 12:54


 2/28/20 12:53   


 


 


 Haloperidol


 Lactate


  (Haldol)  5 mg  Q4H  PRN


 IM


 Agitation  1/29/20 12:54


 2/28/20 12:53   


 


 


 Insulin Aspart


  (NovoLOG)    BEFORE MEALS AND  HS


 SUBQ


   1/29/20 16:30


 2/23/20 20:59  1/29/20 17:34


 


 


 Insulin Detemir


  (Levemir)  10 units  Q12HR


 SUBQ


   1/29/20 21:00


 2/25/20 20:59  1/29/20 21:53


 


 


 Iohexol


  (Omnipaque)  100 mg  NOW  PRN


 INJ


 Radiology Procedure  1/29/20 12:55


 1/30/20 12:54   


 


 


 Lorazepam


  (Ativan 2mg/ml


 1ml)  2 mg  Q2H  PRN


 IV


 For Anxiety  1/29/20 12:55


 2/5/20 12:54   


 


 


 Methylprednisolone


 Sodium Succinate


  (Solu-MEDROL)  40 mg  EVERY 8  HOURS


 IVP


   1/29/20 14:00


 2/25/20 12:59  1/30/20 06:33


 


 


 Morphine Sulfate


  (Morphine


 Sulfate)  2 mg  Q2H  PRN


 IVP


 For Pain  1/29/20 12:55


 2/5/20 12:54   


 


 


 Nitroglycerin


  (Nitro-Bid)  1 inch  TID@0600,1200,1800


 TOPIC


   1/29/20 18:00


 2/26/20 05:59  1/30/20 06:33


 


 


 Pantoprazole


  (Protonix)  40 mg  EVERY 12  HOURS


 IVP


   1/29/20 21:00


 2/22/20 20:59  1/29/20 21:49


 


 


 Polyethylene


 Glycol


  (Miralax)  17 gm  DAILY


 ORAL


   1/30/20 09:00


 2/29/20 08:59   


 


 


 Risperidone


  (RisperDAL)  2 mg  QHS


 ORAL


   1/29/20 21:00


 2/21/20 20:59  1/29/20 21:49


 


 


 Trazodone HCl


  (Desyrel)  100 mg  BEDTIME


 ORAL


   1/29/20 21:00


 2/21/20 20:59   


 

















Nathen Joiner MD Jan 30, 2020 07:00

## 2020-01-30 NOTE — NUR
NURSE NOTES:

Dr. Carter aware of PVCx4 episode at 17:47 today. no new order received. will continue to 
monitor pt.

## 2020-01-30 NOTE — NUR
NURSE NOTES:



Patient getting out of bed. Assisted patient back in bed. Reinstructed patient to use call 
light when he needs help. Patient verbalized understanding at this time.

## 2020-01-30 NOTE — NUR
NURSE NOTES:

Dr. Carter aware of PVCx4 episode at 14:47 today. no new order received. will continue to 
monitor pt. 

-------------------------------------------------------------------------------

Addendum: 01/30/20 at 2045 by SALIMA SANTANA RN

-------------------------------------------------------------------------------

wrong time

## 2020-01-30 NOTE — PROGRESS NOTE
DATE:  01/30/2020

SUBJECTIVE:  The patient is much improved.  No SOB.  Still has episodes of

agitation.  Less confused, more directable.



MENTAL STATUS EXAMINATION:  Alert and oriented times to self and place.

Mood is neutral to anxious.  Affect is flat.  Thought process is concrete.

Thought content, no suicidal or homicidal ideation.



PLAN:

1. Continue the risperidone 2 mg at bedtime.

2. Trazodone 100 mg at bedtime.

3. Ativan p.r.n.

4. We will continue to follow and readjust the medications.









  ______________________________________________

  Leland Harris M.D.





DR:  DUSTY

D:  01/29/2020 23:27

T:  01/30/2020 10:46

JOB#:  [id number]

CC:

## 2020-01-30 NOTE — NUR
SI:    RESP FAILURE,S/P EXTUBATION

T. 98.4 HR 71 RR 18 B/P 133/97

NC 4L HIGH FLOW 40%







IS:   SOLU MEDROL IV

PROTONIX IV

ROCEPHIN IV

********STEP DOWN STATUS****

## 2020-01-30 NOTE — NUR
NURSE NOTES:

bed changed to new clean sheets, and provided bath. pt is passive and talkative at this 
moment. call light within reach.  pt shows no SOB at this moment.

## 2020-01-30 NOTE — GI PROGRESS NOTE
Assessment/Plan


Problems:  


(1) Abnormal LFTs


ICD Codes:  R94.5 - Abnormal results of liver function studies


SNOMED:  030999940


(2) COPD exacerbation


ICD Codes:  J44.1 - Chronic obstructive pulmonary disease with (acute) 

exacerbation


SNOMED:  596125124


(3) Elevated troponin


ICD Codes:  R79.89 - Other specified abnormal findings of blood chemistry


SNOMED:  074209964, 601515805, 045637444


(4) Diabetes mellitus


ICD Codes:  E11.9 - Type 2 diabetes mellitus without complications


SNOMED:  20181735


Status:  stable


Status Narrative


Discussed with Dr. Reeves


Assessment/Plan


Abdominal US reviewed >> Cholelithiasis. Hepatosplenomegaly.  Some degree of 

portal hypertension not excluded. Trace ascites


We will obtain hepatitis panel to rule out any viral hepatitis >> negative


Elevated LFTs, downtrending with near resolution





advance diet as tolerated


hold statins


on NC


ppi


prn transfusion


repeat liver function tests for tomorrow


will follow on a daily basis with additional recommendations


recommend outpatient fibroid scan to grade cirrhosis





The patient was seen and examined at bedside and all new and available data was 

reviewed in the patients chart. I agree with the above findings, impression 

and plan.  (Patient seen earlier today. Signature stamp does not reflect 

patient encounter time.). - Camron Reeves MD





Subjective


Subjective


Denies any abdominal pain


Denies any nausea vomiting


Able to tolerate diet without any complications





Objective





Last 24 Hour Vital Signs








  Date Time  Temp Pulse Resp B/P (MAP) Pulse Ox O2 Delivery O2 Flow Rate FiO2


 


1/30/20 10:33     92 Nasal Cannula 5.0 40


 


1/30/20 09:51      Nasal Cannula 40.0 


 


1/30/20 08:00       40.0 45


 


1/30/20 08:00      Nasal Cannula 40.0 


 


1/30/20 08:00 98.4 71 18 133/97 (109) 93   


 


1/30/20 07:48  80      


 


1/30/20 06:53     93 High Flow 40.0 50


 


1/30/20 06:33    110/75    


 


1/30/20 04:01  65      


 


1/30/20 04:00 97.1 60 24 110/75 (87) 93   


 


1/30/20 04:00       40.0 45


 


1/30/20 04:00      Nasal Cannula 40.0 


 


1/30/20 03:15     93 High Flow 40.0 45


 


1/30/20 00:00 97.3 66 20 152/91 (111) 96   


 


1/30/20 00:00      Nasal Cannula 40.0 


 


1/30/20 00:00       40.0 45


 


1/29/20 23:23  56      


 


1/29/20 23:00     95 High Flow 40.0 45


 


1/29/20 20:00       40.0 45


 


1/29/20 20:00      Nasal Cannula 40.0 


 


1/29/20 20:00 97.0 66 20 124/73 (90) 95   


 


1/29/20 19:07  60      


 


1/29/20 18:54     93 High Flow 40.0 45


 


1/29/20 17:40    124/83    


 


1/29/20 16:00       40.0 45


 


1/29/20 16:00  65      


 


1/29/20 16:00 97.5 66 20 124/83 (97) 98   


 


1/29/20 16:00      Nasal Cannula 40.0 


 


1/29/20 15:22     95 High Flow 40.0 45


 


1/29/20 12:00       40.0 45


 


1/29/20 12:00  71      


 


1/29/20 12:00      Nasal Cannula 40.0 


 


1/29/20 11:44    108/85    

















Intake and Output  


 


 1/29/20 1/30/20





 19:00 07:00


 


Intake Total 145 ml 30 ml


 


Output Total 1265 ml 800 ml


 


Balance -1120 ml -770 ml


 


  


 


Intake Oral 90 ml 30 ml


 


IV Total 55 ml 


 


Output Urine Total 1265 ml 800 ml








Height (Feet):  6


Height (Inches):  3.00


Weight (Pounds):  238


General Appearance:  WD/WN, no apparent distress, alert


Cardiovascular:  normal rate


Respiratory/Chest:  normal breath sounds, no respiratory distress


Abdominal Exam:  normal bowel sounds, non tender, soft


Extremities:  normal range of motion, non-tender











Rashid Webber NP Jan 30, 2020 11:24

## 2020-01-30 NOTE — NUR
SWALLOW STATUS AND PLAN:

PATIENT CLEARED FOR ST INTERVENTION BY MILADIS BORREGO.  PATIENT SEEN IN CONTEXT OF NOON MEAL.  HE 
IS REFUSING PUREE SOLIDS AND THICKENED LIQUIDS.  WHEN PRESENTED WITH P.O. TRIALS OF SOFT 
SOLID AND THIN LIQUIDS (6 0Z CUP/SIP), PATIENT APPEARED TO TOLERATE THE ADVANCE IN TEXTURE 
WITH NO OVERT S/S OF ASPIRATION.  WITHOUT SUPERVISION, HE EXHIBITS IMPULSIVITY.  WITH DIET 
TEXTURE ADVANCE, HE NEEDS SUPERVISION FOR SAFE INTAKE.  WHEN ASKED TO DRAW A CLOCK, HE 
LOOKED AT THE CLOCK ON THE WALL AND COPIED IT ON PAPER.  HE WAS ORIENTED X2.  D/W DIET 
TEXTURE ADVANCED WITH RN.  ST TO MONITOR TO TOLERANCE OF TEXTURE ADVANCE.

## 2020-01-30 NOTE — NEPHROLOGY PROGRESS NOTE
Assessment/Plan


Problem List:  


(1) Electrolyte imbalance


(2) Acute respiratory failure


(3) Elevated troponin


(4) Diabetes mellitus


Plan


DC dee


Mag supplement


now extubated-


per cardiologist


add nitrates


mag and K supplement as needed


taper steroids


per orders





Subjective


ROS Limited/Unobtainable:  No


Constitutional:  Reports: malaise





Objective


Objective





Last 24 Hour Vital Signs








  Date Time  Temp Pulse Resp B/P (MAP) Pulse Ox O2 Delivery O2 Flow Rate FiO2


 


1/30/20 08:00       40.0 45


 


1/30/20 08:00      Nasal Cannula 40.0 


 


1/30/20 08:00 98.4 71 18 133/97 (109) 93   


 


1/30/20 07:48  80      


 


1/30/20 06:53     93 High Flow 40.0 50


 


1/30/20 06:33    110/75    


 


1/30/20 04:01  65      


 


1/30/20 04:00 97.1 60 24 110/75 (87) 93   


 


1/30/20 04:00       40.0 45


 


1/30/20 04:00      Nasal Cannula 40.0 


 


1/30/20 03:15     93 High Flow 40.0 45


 


1/30/20 00:00 97.3 66 20 152/91 (111) 96   


 


1/30/20 00:00      Nasal Cannula 40.0 


 


1/30/20 00:00       40.0 45


 


1/29/20 23:23  56      


 


1/29/20 23:00     95 High Flow 40.0 45


 


1/29/20 20:00       40.0 45


 


1/29/20 20:00      Nasal Cannula 40.0 


 


1/29/20 20:00 97.0 66 20 124/73 (90) 95   


 


1/29/20 19:07  60      


 


1/29/20 18:54     93 High Flow 40.0 45


 


1/29/20 17:40    124/83    


 


1/29/20 16:00       40.0 45


 


1/29/20 16:00  65      


 


1/29/20 16:00 97.5 66 20 124/83 (97) 98   


 


1/29/20 16:00      Nasal Cannula 40.0 


 


1/29/20 15:22     95 High Flow 40.0 45


 


1/29/20 12:00       40.0 45


 


1/29/20 12:00  71      


 


1/29/20 12:00      Nasal Cannula 40.0 


 


1/29/20 11:44    108/85    


 


1/29/20 11:05     94 High Flow 40.0 45


 


1/29/20 11:00  71 22 122/82 (95) 97   


 


1/29/20 10:30  73 24 124/80 (95) 96   


 


1/29/20 10:00  72 18 122/83 (96) 96   

















Intake and Output  


 


 1/29/20 1/30/20





 19:00 07:00


 


Intake Total 145 ml 30 ml


 


Output Total 1265 ml 800 ml


 


Balance -1120 ml -770 ml


 


  


 


Intake Oral 90 ml 30 ml


 


IV Total 55 ml 


 


Output Urine Total 1265 ml 800 ml








Height (Feet):  6


Height (Inches):  3.00


Weight (Pounds):  238


General Appearance:  no apparent distress


Cardiovascular:  normal rate


Respiratory/Chest:  decreased breath sounds


Abdomen:  distended


Objective


no change











Lawson Vergara MD Jan 30, 2020 09:53

## 2020-01-30 NOTE — NUR
NURSE NOTES:

received pt from Araceli REDDING., pt is awake and resting on the bed. AO x2-3 confused pt. pt is 
on NC 5L and no SOB noted, pt states no pain at this time. Right FA 18G IV site is intact, 
clean, and patent. per Previous shift, no Dysrhythmia reported. call light within reach. pt 
is at the nearest to nursing station. bed at the lowest position, alarmed, and locked. will 
continue to monitor pt with plan of care.

## 2020-01-30 NOTE — NUR
*-* INSURANCE *-*



ALL AVAILABLE CLINICALS AND REVIEWS  HAVE BEEN FAXED TO:



[facility]

[place]

P: [phone]

F: [phone]

REF# [id number]

## 2020-01-30 NOTE — NUR
NURSE NOTES:



Received patient in bed. Awake, verbal, able to make some needs known. In no apparent 
distress. Currently on high flow oxygen therapy. Call light within reach. Bed in lowest 
position. Will continue plan of care.

## 2020-01-30 NOTE — CARDIOLOGY PROGRESS NOTE
Assessment/Plan


Problem List:  


(1) Atrial flutter


(2) COPD exacerbation


(3) Elevated troponin


(4) Schizophrenia


(5) Hypoglycemia


(6) Diabetes mellitus


Status:  stable, progressing


Status Narrative


Pt w/ COPD, schizophrenia, and type II DM, adm w/ LOC in setting of low 

glucose. He had AFL w/ 1:1 conduction, ventricular rate 250 bpm in setting of 

resp failure, on vent. Respiratory status has improved, and he was extubated.  

AFL resolved w/ iv metoprolol and digoxin. He is currently in NSR


ECHO w/ pulm hypertension, RV dilation, hypokinesis.   No evidence of PE on CTA 

and no DVT on venous duplex. 


He had mild troponin elevation following the AFL episode,  and EKG showing deep 

T inversion inf and anteriorly - c/w nonstemi.


Assessment/Plan


He is currently off antiarrhythmic agents. Would start b blocker if no 

contraindication from pulm standpoint, for rate control if AFL recurs.Would not 

anticoagulate as CHADS VASC score is 1. 


Continue asa  for CAD.  Check lipids 


Consider stress nuclear study to r/o ischemia once pt more stable.  


Rx for copd exacerbation per primary team.





Subjective


ROS Limited/Unobtainable:  No


Subjective


Cardiology for Dr. Murphy





Pt is alert, comfortable. Per nursing, he is intermittently agitated, 

attempting to get out of bed





Objective





Last 24 Hour Vital Signs








  Date Time  Temp Pulse Resp B/P (MAP) Pulse Ox O2 Delivery O2 Flow Rate FiO2


 


1/30/20 17:12    129/87    


 


1/30/20 16:00       5.0 


 


1/30/20 16:00 97.4 62 18 129/87 (101) 98   


 


1/30/20 16:00      Nasal Cannula 5.0 


 


1/30/20 15:26  60      


 


1/30/20 12:55    121/83    


 


1/30/20 12:00       5.0 


 


1/30/20 12:00 97.8 59 18 121/83 (96) 94   


 


1/30/20 12:00      Nasal Cannula 5.0 


 


1/30/20 11:42  59      


 


1/30/20 10:33     92 Nasal Cannula 5.0 40


 


1/30/20 09:51      Nasal Cannula 40.0 


 


1/30/20 08:00       40.0 45


 


1/30/20 08:00      Nasal Cannula 40.0 


 


1/30/20 08:00 98.4 71 18 133/97 (109) 93   


 


1/30/20 07:48  80      


 


1/30/20 06:53     93 High Flow 40.0 50


 


1/30/20 06:33    110/75    


 


1/30/20 04:01  65      


 


1/30/20 04:00 97.1 60 24 110/75 (87) 93   


 


1/30/20 04:00       40.0 45


 


1/30/20 04:00      Nasal Cannula 40.0 


 


1/30/20 03:15     93 High Flow 40.0 45


 


1/30/20 00:00 97.3 66 20 152/91 (111) 96   


 


1/30/20 00:00      Nasal Cannula 40.0 


 


1/30/20 00:00       40.0 45


 


1/29/20 23:23  56      


 


1/29/20 23:00     95 High Flow 40.0 45


 


1/29/20 20:00       40.0 45


 


1/29/20 20:00      Nasal Cannula 40.0 


 


1/29/20 20:00 97.0 66 20 124/73 (90) 95   








General Appearance:  WD/WN, no apparent distress, alert


EENT:  PERRL/EOMI


Neck:  supple, no JVD


Rhythm:  NSR


Cardiovascular:  normal rate, regular rhythm, systolic murmur - i/vi RAE


Respiratory/Chest:  normal breath sounds, no respiratory distress


Abdomen:  non tender, soft


Extremities:  no swelling











Intake and Output  


 


 1/29/20 1/30/20





 19:00 07:00


 


Intake Total 145 ml 30 ml


 


Output Total 1265 ml 800 ml


 


Balance -1120 ml -770 ml


 


  


 


Intake Oral 90 ml 30 ml


 


IV Total 55 ml 


 


Output Urine Total 1265 ml 800 ml

















Moni Calvert MD Jan 30, 2020 19:27

## 2020-01-30 NOTE — PULMONOLOGY PROGRESS NOTE
Assessment/Plan


Assessment/Plan


IMPRESSION:


1. Hypoglycemia. Seen by endocrine; now resolved


2. Metabolic encephalopathy. Resolved


3. Chronic obstructive pulmonary disease with exacerbation. Now intubated 


4. Acute myocardial infarction.


5. Hyponatremia. Corrected


6.Acute on chronic congestive heart failure, possibly systolic and


diastolic.





PLAN:


1. Now extubated


2. Wean down O2; currently on Hi Flow


3. Anti-platelet therapy.


4. Off Fentanyl


5. Current present care


6. DVT prophylaxis.


7. Empiric antimicrobials.


8. Swallow eval noted








GI, endocrine and cardiology consults appreciated


Discussed psych medication regimen with Dr Harris














  ______________________________________________


True Duffy M.D.





Subjective


Interval Events:  Looking better


Constitutional:  Reports: no symptoms


HEENT:  Repors: no symptoms


Respiratory:  Reports: no symptoms


Cardiovascular:  Reports: no symptoms


Gastrointestinal/Abdominal:  Reports: no symptoms


Genitourinary:  Reports: no symptoms


Allergies:  


Coded Allergies:  


     No Known Allergies (Unverified , 1/16/18)





Objective





Last 24 Hour Vital Signs








  Date Time  Temp Pulse Resp B/P (MAP) Pulse Ox O2 Delivery O2 Flow Rate FiO2


 


1/30/20 15:26  60      


 


1/30/20 12:55    121/83    


 


1/30/20 12:00       5.0 


 


1/30/20 12:00 97.8 59 18 121/83 (96) 94   


 


1/30/20 12:00      Nasal Cannula 5.0 


 


1/30/20 11:42  59      


 


1/30/20 10:33     92 Nasal Cannula 5.0 40


 


1/30/20 09:51      Nasal Cannula 40.0 


 


1/30/20 08:00       40.0 45


 


1/30/20 08:00      Nasal Cannula 40.0 


 


1/30/20 08:00 98.4 71 18 133/97 (109) 93   


 


1/30/20 07:48  80      


 


1/30/20 06:53     93 High Flow 40.0 50


 


1/30/20 06:33    110/75    


 


1/30/20 04:01  65      


 


1/30/20 04:00 97.1 60 24 110/75 (87) 93   


 


1/30/20 04:00       40.0 45


 


1/30/20 04:00      Nasal Cannula 40.0 


 


1/30/20 03:15     93 High Flow 40.0 45


 


1/30/20 00:00 97.3 66 20 152/91 (111) 96   


 


1/30/20 00:00      Nasal Cannula 40.0 


 


1/30/20 00:00       40.0 45


 


1/29/20 23:23  56      


 


1/29/20 23:00     95 High Flow 40.0 45


 


1/29/20 20:00       40.0 45


 


1/29/20 20:00      Nasal Cannula 40.0 


 


1/29/20 20:00 97.0 66 20 124/73 (90) 95   


 


1/29/20 19:07  60      


 


1/29/20 18:54     93 High Flow 40.0 45


 


1/29/20 17:40    124/83    

















Intake and Output  


 


 1/29/20 1/30/20





 19:00 07:00


 


Intake Total 145 ml 30 ml


 


Output Total 1265 ml 800 ml


 


Balance -1120 ml -770 ml


 


  


 


Intake Oral 90 ml 30 ml


 


IV Total 55 ml 


 


Output Urine Total 1265 ml 800 ml








General Appearance:  no acute distress


HEENT:  normocephalic


Respiratory/Chest:  chest wall non-tender, decreased breath sounds


Cardiovascular:  normal peripheral pulses, normal rate


Abdomen:  normal bowel sounds





Current Medications








 Medications


  (Trade)  Dose


 Ordered  Sig/German


 Route


 PRN Reason  Start Time


 Stop Time Status Last Admin


Dose Admin


 


 Acetaminophen


  (Tylenol)  650 mg  Q6H  PRN


 ORAL


 Mild Pain/Temp > 100.5  1/29/20 12:54


 2/28/20 12:53  1/30/20 09:11


 


 


 Aspirin


  (ASA)  81 mg  DAILY


 ORAL


   1/30/20 09:00


 2/21/20 08:59  1/30/20 08:32


 


 


 Dextrose


  (Dextrose 50%)  25 ml  Q30M  PRN


 IV


 Hypoglycemia  1/29/20 12:45


 2/23/20 07:14   


 


 


 Dextrose


  (Dextrose 50%)  50 ml  Q30M  PRN


 IV


 Hypoglycemia  1/29/20 12:45


 2/23/20 07:14   


 


 


 Docusate Sodium


  (Colace)  100 mg  DAILY


 ORAL


   1/30/20 09:00


 2/29/20 08:59  1/30/20 08:32


 


 


 Guaifenesin/


 Dextromethorphan


  (Robitussin DM


 Syrup)  5 ml  Q6H  PRN


 ORAL


 For Cough  1/29/20 12:54


 2/28/20 12:53   


 


 


 Haloperidol


 Lactate


  (Haldol)  5 mg  Q4H  PRN


 IM


 Agitation  1/29/20 12:54


 2/28/20 12:53   


 


 


 Insulin Aspart


  (NovoLOG)    BEFORE MEALS AND  HS


 SUBQ


   1/29/20 16:30


 2/23/20 20:59  1/29/20 17:34


 


 


 Insulin Detemir


  (Levemir)  10 units  Q12HR


 SUBQ


   1/29/20 21:00


 2/25/20 20:59  1/30/20 08:43


 


 


 Lorazepam


  (Ativan 2mg/ml


 1ml)  2 mg  Q2H  PRN


 IV


 For Anxiety  1/29/20 12:55


 2/5/20 12:54   


 


 


 Methylprednisolone


 Sodium Succinate


  (Solu-MEDROL)  40 mg  EVERY 8  HOURS


 IVP


   1/29/20 14:00


 2/25/20 12:59  1/30/20 14:48


 


 


 Morphine Sulfate


  (Morphine


 Sulfate)  2 mg  Q2H  PRN


 IVP


 For Pain  1/29/20 12:55


 2/5/20 12:54   


 


 


 Nitroglycerin


  (Nitro-Bid)  1 inch  TID@0600,1200,1800


 TOPIC


   1/29/20 18:00


 2/26/20 05:59  1/30/20 12:55


 


 


 Pantoprazole


  (Protonix)  40 mg  EVERY 12  HOURS


 IVP


   1/29/20 21:00


 2/22/20 20:59  1/30/20 08:33


 


 


 Polyethylene


 Glycol


  (Miralax)  17 gm  DAILY


 ORAL


   1/30/20 09:00


 2/29/20 08:59  1/30/20 08:32


 


 


 Risperidone


  (RisperDAL)  2 mg  QHS


 ORAL


   1/29/20 21:00


 2/21/20 20:59  1/29/20 21:49


 


 


 Trazodone HCl


  (Desyrel)  100 mg  BEDTIME


 ORAL


   1/29/20 21:00


 2/21/20 20:59   


 

















True Duffy MD Jan 30, 2020 16:12

## 2020-01-31 VITALS — SYSTOLIC BLOOD PRESSURE: 120 MMHG | DIASTOLIC BLOOD PRESSURE: 73 MMHG

## 2020-01-31 VITALS — SYSTOLIC BLOOD PRESSURE: 134 MMHG | DIASTOLIC BLOOD PRESSURE: 93 MMHG

## 2020-01-31 VITALS — SYSTOLIC BLOOD PRESSURE: 146 MMHG | DIASTOLIC BLOOD PRESSURE: 83 MMHG

## 2020-01-31 VITALS — SYSTOLIC BLOOD PRESSURE: 120 MMHG | DIASTOLIC BLOOD PRESSURE: 81 MMHG

## 2020-01-31 VITALS — DIASTOLIC BLOOD PRESSURE: 89 MMHG | SYSTOLIC BLOOD PRESSURE: 123 MMHG

## 2020-01-31 VITALS — DIASTOLIC BLOOD PRESSURE: 84 MMHG | SYSTOLIC BLOOD PRESSURE: 124 MMHG

## 2020-01-31 LAB
ADD MANUAL DIFF: NO
ALBUMIN SERPL-MCNC: 2.3 G/DL (ref 3.4–5)
ALBUMIN/GLOB SERPL: 0.9 {RATIO} (ref 1–2.7)
ALP SERPL-CCNC: 69 U/L (ref 46–116)
ALT SERPL-CCNC: 131 U/L (ref 12–78)
ANION GAP SERPL CALC-SCNC: -2 MMOL/L (ref 5–15)
AST SERPL-CCNC: 18 U/L (ref 15–37)
BASOPHILS NFR BLD AUTO: 0.6 % (ref 0–2)
BILIRUB DIRECT SERPL-MCNC: 0.5 MG/DL (ref 0–0.3)
BILIRUB SERPL-MCNC: 1.5 MG/DL (ref 0.2–1)
BUN SERPL-MCNC: 21 MG/DL (ref 7–18)
CALCIUM SERPL-MCNC: 7.8 MG/DL (ref 8.5–10.1)
CHLORIDE SERPL-SCNC: 100 MMOL/L (ref 98–107)
CO2 SERPL-SCNC: 38 MMOL/L (ref 21–32)
CREAT SERPL-MCNC: 0.5 MG/DL (ref 0.55–1.3)
EOSINOPHIL NFR BLD AUTO: 0 % (ref 0–3)
ERYTHROCYTE [DISTWIDTH] IN BLOOD BY AUTOMATED COUNT: 17.6 % (ref 11.6–14.8)
GLOBULIN SER-MCNC: 2.6 G/DL
HCT VFR BLD CALC: 53.6 % (ref 42–52)
HGB BLD-MCNC: 16.7 G/DL (ref 14.2–18)
LYMPHOCYTES NFR BLD AUTO: 11.5 % (ref 20–45)
MCV RBC AUTO: 85 FL (ref 80–99)
MONOCYTES NFR BLD AUTO: 6.3 % (ref 1–10)
NEUTROPHILS NFR BLD AUTO: 81.6 % (ref 45–75)
PHOSPHATE SERPL-MCNC: 3.8 MG/DL (ref 2.5–4.9)
PLATELET # BLD: 135 K/UL (ref 150–450)
POTASSIUM SERPL-SCNC: 4.8 MMOL/L (ref 3.5–5.1)
RBC # BLD AUTO: 6.34 M/UL (ref 4.7–6.1)
SODIUM SERPL-SCNC: 136 MMOL/L (ref 136–145)
WBC # BLD AUTO: 10.6 K/UL (ref 4.8–10.8)

## 2020-01-31 RX ADMIN — INSULIN ASPART SCH UNITS: 100 INJECTION, SOLUTION INTRAVENOUS; SUBCUTANEOUS at 05:33

## 2020-01-31 RX ADMIN — NATEGLINIDE SCH MG: 60 TABLET ORAL at 07:15

## 2020-01-31 RX ADMIN — INSULIN ASPART SCH UNITS: 100 INJECTION, SOLUTION INTRAVENOUS; SUBCUTANEOUS at 11:30

## 2020-01-31 RX ADMIN — INSULIN ASPART SCH UNITS: 100 INJECTION, SOLUTION INTRAVENOUS; SUBCUTANEOUS at 20:13

## 2020-01-31 RX ADMIN — POLYETHYLENE GLYCOL 3350 SCH GM: 17 POWDER, FOR SOLUTION ORAL at 09:00

## 2020-01-31 RX ADMIN — INSULIN ASPART SCH UNITS: 100 INJECTION, SOLUTION INTRAVENOUS; SUBCUTANEOUS at 17:20

## 2020-01-31 RX ADMIN — NITROGLYCERIN SCH INCH: 20 OINTMENT TOPICAL at 05:33

## 2020-01-31 RX ADMIN — NITROGLYCERIN SCH INCH: 20 OINTMENT TOPICAL at 17:27

## 2020-01-31 RX ADMIN — NITROGLYCERIN SCH INCH: 20 OINTMENT TOPICAL at 12:32

## 2020-01-31 RX ADMIN — ASPIRIN 81 MG SCH MG: 81 TABLET ORAL at 09:22

## 2020-01-31 RX ADMIN — NATEGLINIDE SCH MG: 60 TABLET ORAL at 17:27

## 2020-01-31 RX ADMIN — DOCUSATE SODIUM SCH MG: 100 CAPSULE, LIQUID FILLED ORAL at 09:00

## 2020-01-31 RX ADMIN — NATEGLINIDE SCH MG: 60 TABLET ORAL at 12:32

## 2020-01-31 RX ADMIN — TRAZODONE HYDROCHLORIDE SCH MG: 100 TABLET ORAL at 20:11

## 2020-01-31 NOTE — GENERAL PROGRESS NOTE
Assessment/Plan


Status:  stable, progressing


Assessment/Plan:


Assessment/Plan


Problems:  


(1) Abnormal LFTs


ICD Codes:  R94.5 - Abnormal results of liver function studies


SNOMED:  068566072


(2) COPD exacerbation


ICD Codes:  J44.1 - Chronic obstructive pulmonary disease with (acute) 

exacerbation


SNOMED:  211329763


(3) Elevated troponin


ICD Codes:  R79.89 - Other specified abnormal findings of blood chemistry


SNOMED:  851884524, 414726678, 224515901


(4) Diabetes mellitus





Assessment/Plan


Abdominal US reviewed >> Cholelithiasis. Hepatosplenomegaly.  Some degree of 

portal hypertension not excluded. Trace ascites


We will obtain hepatitis panel to rule out any viral hepatitis >> negative


Elevated LFTs, downtrending





advance diet as tolerated


hold statins


on NC


ppi


prn transfusion


repeat liver function tests for tomorrow


will follow on a daily basis with additional recommendations


recommend outpatient fibroid scan to grade cirrhosis





Subjective


ROS Limited/Unobtainable:  No


Allergies:  


Coded Allergies:  


     No Known Allergies (Unverified , 1/16/18)





Objective





Last 24 Hour Vital Signs








  Date Time  Temp Pulse Resp B/P (MAP) Pulse Ox O2 Delivery O2 Flow Rate FiO2


 


1/31/20 09:14     96 Nasal Cannula 5.0 40


 


1/31/20 08:06  62      


 


1/31/20 05:33    148/78    


 


1/31/20 04:00 97.7 68 20 146/83 (104) 95   


 


1/31/20 04:00      Nasal Cannula 5.0 


 


1/31/20 04:00       5.0 


 


1/31/20 03:37  68      


 


1/31/20 00:00 97.5 76 20 120/73 (89) 95   


 


1/31/20 00:00      Nasal Cannula 5.0 


 


1/30/20 23:34  72      


 


1/30/20 20:00      Nasal Cannula 5.0 


 


1/30/20 20:00       5.0 


 


1/30/20 20:00 97.8 71 20 121/83 (96) 95   


 


1/30/20 19:57     95 Nasal Cannula 5.0 40


 


1/30/20 19:43  64      


 


1/30/20 17:12    129/87    


 


1/30/20 16:00       5.0 


 


1/30/20 16:00 97.4 62 18 129/87 (101) 98   


 


1/30/20 16:00      Nasal Cannula 5.0 


 


1/30/20 15:26  60      


 


1/30/20 12:55    121/83    


 


1/30/20 12:00       5.0 


 


1/30/20 12:00 97.8 59 18 121/83 (96) 94   


 


1/30/20 12:00      Nasal Cannula 5.0 


 


1/30/20 11:42  59      


 


1/30/20 10:33     92 Nasal Cannula 5.0 40


 


1/30/20 09:51      Nasal Cannula 40.0 

















Intake and Output  


 


 1/30/20 1/31/20





 19:00 07:00


 


Intake Total 420 ml 650 ml


 


Output Total 600 ml 700 ml


 


Balance -180 ml -50 ml


 


  


 


Intake Oral 420 ml 650 ml


 


Output Urine Total 600 ml 700 ml








Laboratory Tests


1/31/20 03:40: 


White Blood Count 10.6, Red Blood Count 6.34H, Hemoglobin 16.7, Hematocrit 53.6H

, Mean Corpuscular Volume 85, Mean Corpuscular Hemoglobin 26.4L, Mean 

Corpuscular Hemoglobin Concent 31.1L, Red Cell Distribution Width 17.6H, 

Platelet Count 135L, Mean Platelet Volume 7.1, Neutrophils (%) (Auto) 81.6H, 

Lymphocytes (%) (Auto) 11.5L, Monocytes (%) (Auto) 6.3, Eosinophils (%) (Auto) 

0.0, Basophils (%) (Auto) 0.6, Sodium Level 136, Potassium Level 4.8, Chloride 

Level 100, Carbon Dioxide Level 38H, Anion Gap -2L, Blood Urea Nitrogen 21H, 

Creatinine 0.5L, Estimat Glomerular Filtration Rate > 60, Glucose Level 144H, 

Calcium Level 7.8L, Phosphorus Level 3.8, Magnesium Level 1.4L, Total Bilirubin 

1.5H, Direct Bilirubin 0.5H, Aspartate Amino Transf (AST/SGOT) 18, Alanine 

Aminotransferase (ALT/SGPT) 131H, Alkaline Phosphatase 69, Total Protein 4.9L, 

Albumin 2.3L, Globulin 2.6, Albumin/Globulin Ratio 0.9L


Height (Feet):  6


Height (Inches):  3.00


Weight (Pounds):  237


General Appearance:  no apparent distress


EENT:  normal ENT inspection


Neck:  supple


Cardiovascular:  normal rate


Respiratory/Chest:  decreased breath sounds


Abdomen:  normal bowel sounds, non tender, soft


Extremities:  non-tender











Vosoghi,Camron MD Jan 31, 2020 09:46

## 2020-01-31 NOTE — NEPHROLOGY PROGRESS NOTE
Assessment/Plan


Problem List:  


(1) Electrolyte imbalance


(2) Acute respiratory failure


(3) Elevated troponin


(4) Diabetes mellitus


Plan





DC dee


Mag supplement


now extubated-


per cardiologist


add nitrates


mag and K supplement as needed


taper steroids


per orders





Subjective


ROS Limited/Unobtainable:  No





Objective


Objective





Last 24 Hour Vital Signs








  Date Time  Temp Pulse Resp B/P (MAP) Pulse Ox O2 Delivery O2 Flow Rate FiO2


 


1/31/20 09:14     96 Nasal Cannula 5.0 40


 


1/31/20 08:06  62      


 


1/31/20 08:00      Nasal Cannula 5.0 


 


1/31/20 08:00       5.0 


 


1/31/20 08:00 97.9 64 20 120/81 (94) 96   


 


1/31/20 05:33    148/78    


 


1/31/20 04:00 97.7 68 20 146/83 (104) 95   


 


1/31/20 04:00      Nasal Cannula 5.0 


 


1/31/20 04:00       5.0 


 


1/31/20 03:37  68      


 


1/31/20 00:00 97.5 76 20 120/73 (89) 95   


 


1/31/20 00:00      Nasal Cannula 5.0 


 


1/30/20 23:34  72      


 


1/30/20 20:00      Nasal Cannula 5.0 


 


1/30/20 20:00       5.0 


 


1/30/20 20:00 97.8 71 20 121/83 (96) 95   


 


1/30/20 19:57     95 Nasal Cannula 5.0 40


 


1/30/20 19:43  64      


 


1/30/20 17:12    129/87    


 


1/30/20 16:00       5.0 


 


1/30/20 16:00 97.4 62 18 129/87 (101) 98   


 


1/30/20 16:00      Nasal Cannula 5.0 


 


1/30/20 15:26  60      


 


1/30/20 12:55    121/83    


 


1/30/20 12:00       5.0 


 


1/30/20 12:00 97.8 59 18 121/83 (96) 94   


 


1/30/20 12:00      Nasal Cannula 5.0 

















Intake and Output  


 


 1/30/20 1/31/20





 19:00 07:00


 


Intake Total 420 ml 650 ml


 


Output Total 600 ml 700 ml


 


Balance -180 ml -50 ml


 


  


 


Intake Oral 420 ml 650 ml


 


Output Urine Total 600 ml 700 ml








Laboratory Tests


1/31/20 03:40: 


White Blood Count 10.6, Red Blood Count 6.34H, Hemoglobin 16.7, Hematocrit 53.6H

, Mean Corpuscular Volume 85, Mean Corpuscular Hemoglobin 26.4L, Mean 

Corpuscular Hemoglobin Concent 31.1L, Red Cell Distribution Width 17.6H, 

Platelet Count 135L, Mean Platelet Volume 7.1, Neutrophils (%) (Auto) 81.6H, 

Lymphocytes (%) (Auto) 11.5L, Monocytes (%) (Auto) 6.3, Eosinophils (%) (Auto) 

0.0, Basophils (%) (Auto) 0.6, Sodium Level 136, Potassium Level 4.8, Chloride 

Level 100, Carbon Dioxide Level 38H, Anion Gap -2L, Blood Urea Nitrogen 21H, 

Creatinine 0.5L, Estimat Glomerular Filtration Rate > 60, Glucose Level 144H, 

Calcium Level 7.8L, Phosphorus Level 3.8, Magnesium Level 1.4L, Total Bilirubin 

1.5H, Direct Bilirubin 0.5H, Aspartate Amino Transf (AST/SGOT) 18, Alanine 

Aminotransferase (ALT/SGPT) 131H, Alkaline Phosphatase 69, Total Protein 4.9L, 

Albumin 2.3L, Globulin 2.6, Albumin/Globulin Ratio 0.9L


Height (Feet):  6


Height (Inches):  3.00


Weight (Pounds):  237


General Appearance:  agitated - at times


Cardiovascular:  normal rate


Respiratory/Chest:  decreased breath sounds


Abdomen:  distended


Objective


no change











Lawson Vergara MD Jan 31, 2020 11:56

## 2020-01-31 NOTE — NUR
DISCHARGE PLANNING:



PATIENT HAS BEEN REFERRED FOR HOME O2

[facility]

T: [phone] ([place])



SHE IS ARRANGING HOME O2

## 2020-01-31 NOTE — GENERAL PROGRESS NOTE
Assessment/Plan


Problem List:  


(1) Diabetes mellitus


ICD Codes:  E11.9 - Type 2 diabetes mellitus without complications


SNOMED:  02120310


(2) Hypoglycemia


ICD Codes:  E16.2 - Hypoglycemia, unspecified


SNOMED:  317999024


(3) Schizophrenia


ICD Codes:  F20.9 - Schizophrenia, unspecified


SNOMED:  13183622


Qualifiers:  


   Qualified Codes:  F20.9 - Schizophrenia, unspecified


(4) Elevated troponin


ICD Codes:  R79.89 - Other specified abnormal findings of blood chemistry


SNOMED:  045183368, 060345785, 843256171


(5) COPD exacerbation


ICD Codes:  J44.1 - Chronic obstructive pulmonary disease with (acute) 

exacerbation


SNOMED:  313960799


(6) Abnormal LFTs


ICD Codes:  R94.5 - Abnormal results of liver function studies


SNOMED:  212941139


Status:  stable, progressing


Assessment/Plan:


reduce Levemir to 10 units qhs 


add Starlix 60 mg ac tid 


continue NISS ac / hs





Subjective


Allergies:  


Coded Allergies:  


     No Known Allergies (Unverified , 1/16/18)


All Systems:  reviewed and negative except above


Subjective


events noted 


fasting glucose on lower side 


mealtime glucose elevated 














Item Value  Date Time


 


Bedside Blood Glucose 82 mg/dl 1/31/20 0630


 


Bedside Blood Glucose 293 mg/dl H 1/30/20 2103


 


Bedside Blood Glucose 194 mg/dl H 1/30/20 1707


 


Bedside Blood Glucose 151 mg/dl H 1/30/20 1205


 


Bedside Blood Glucose 202 mg/dl H 1/30/20 0843


 


Bedside Blood Glucose 144 mg/dl H 1/30/20 0630











Objective





Last 24 Hour Vital Signs








  Date Time  Temp Pulse Resp B/P (MAP) Pulse Ox O2 Delivery O2 Flow Rate FiO2


 


1/31/20 05:33    148/78    


 


1/31/20 04:00 97.7 68 20 146/83 (104) 95   


 


1/31/20 04:00      Nasal Cannula 5.0 


 


1/31/20 04:00       5.0 


 


1/31/20 03:37  68      


 


1/31/20 00:00 97.5 76 20 120/73 (89) 95   


 


1/31/20 00:00      Nasal Cannula 5.0 


 


1/30/20 23:34  72      


 


1/30/20 20:00      Nasal Cannula 5.0 


 


1/30/20 20:00       5.0 


 


1/30/20 20:00 97.8 71 20 121/83 (96) 95   


 


1/30/20 19:57     95 Nasal Cannula 5.0 40


 


1/30/20 19:43  64      


 


1/30/20 17:12    129/87    


 


1/30/20 16:00       5.0 


 


1/30/20 16:00 97.4 62 18 129/87 (101) 98   


 


1/30/20 16:00      Nasal Cannula 5.0 


 


1/30/20 15:26  60      


 


1/30/20 12:55    121/83    


 


1/30/20 12:00       5.0 


 


1/30/20 12:00 97.8 59 18 121/83 (96) 94   


 


1/30/20 12:00      Nasal Cannula 5.0 


 


1/30/20 11:42  59      


 


1/30/20 10:33     92 Nasal Cannula 5.0 40


 


1/30/20 09:51      Nasal Cannula 40.0 


 


1/30/20 08:00       40.0 45


 


1/30/20 08:00      Nasal Cannula 40.0 


 


1/30/20 08:00 98.4 71 18 133/97 (109) 93   


 


1/30/20 07:48  80      

















Intake and Output  


 


 1/30/20 1/31/20





 19:00 07:00


 


Intake Total 420 ml 650 ml


 


Output Total 600 ml 700 ml


 


Balance -180 ml -50 ml


 


  


 


Intake Oral 420 ml 650 ml


 


Output Urine Total 600 ml 700 ml








Laboratory Tests


1/31/20 03:40: 


White Blood Count 10.6, Red Blood Count 6.34H, Hemoglobin 16.7, Hematocrit 53.6H

, Mean Corpuscular Volume 85, Mean Corpuscular Hemoglobin 26.4L, Mean 

Corpuscular Hemoglobin Concent 31.1L, Red Cell Distribution Width 17.6H, 

Platelet Count 135L, Mean Platelet Volume 7.1, Neutrophils (%) (Auto) 81.6H, 

Lymphocytes (%) (Auto) 11.5L, Monocytes (%) (Auto) 6.3, Eosinophils (%) (Auto) 

0.0, Basophils (%) (Auto) 0.6, Sodium Level 136, Potassium Level 4.8, Chloride 

Level 100, Carbon Dioxide Level 38H, Anion Gap -2L, Blood Urea Nitrogen 21H, 

Creatinine 0.5L, Estimat Glomerular Filtration Rate > 60, Glucose Level 144H, 

Calcium Level 7.8L, Phosphorus Level 3.8, Magnesium Level 1.4L, Total Bilirubin 

1.5H, Direct Bilirubin 0.5H, Aspartate Amino Transf (AST/SGOT) 18, Alanine 

Aminotransferase (ALT/SGPT) 131H, Alkaline Phosphatase 69, Total Protein 4.9L, 

Albumin 2.3L, Globulin 2.6, Albumin/Globulin Ratio 0.9L


Height (Feet):  6


Height (Inches):  3.00


Weight (Pounds):  237


General Appearance:  no apparent distress


Neck:  normal alignment


Cardiovascular:  normal rate


Respiratory/Chest:  lungs clear


Abdomen:  normal bowel sounds


Pelvis:  normal external exam


Objective





Current Medications








 Medications


  (Trade)  Dose


 Ordered  Sig/German


 Route


 PRN Reason  Start Time


 Stop Time Status Last Admin


Dose Admin


 


 Acetaminophen


  (Tylenol)  650 mg  Q6H  PRN


 ORAL


 Mild Pain/Temp > 100.5  1/29/20 12:54


 2/28/20 12:53  1/30/20 22:06


 


 


 Aspirin


  (ASA)  81 mg  DAILY


 ORAL


   1/30/20 09:00


 2/21/20 08:59  1/30/20 08:32


 


 


 Dextrose


  (Dextrose 50%)  25 ml  Q30M  PRN


 IV


 Hypoglycemia  1/29/20 12:45


 2/23/20 07:14   


 


 


 Dextrose


  (Dextrose 50%)  50 ml  Q30M  PRN


 IV


 Hypoglycemia  1/29/20 12:45


 2/23/20 07:14   


 


 


 Docusate Sodium


  (Colace)  100 mg  DAILY


 ORAL


   1/30/20 09:00


 2/29/20 08:59  1/30/20 08:32


 


 


 Guaifenesin/


 Dextromethorphan


  (Robitussin DM


 Syrup)  5 ml  Q6H  PRN


 ORAL


 For Cough  1/29/20 12:54


 2/28/20 12:53   


 


 


 Haloperidol


 Lactate


  (Haldol)  5 mg  Q4H  PRN


 IM


 Agitation  1/29/20 12:54


 2/28/20 12:53   


 


 


 Insulin Aspart


  (NovoLOG)    BEFORE MEALS AND  HS


 SUBQ


   1/29/20 16:30


 2/23/20 20:59  1/30/20 21:03


 


 


 Insulin Detemir


  (Levemir)  10 units  Q12HR


 SUBQ


   1/29/20 21:00


 2/25/20 20:59  1/30/20 21:03


 


 


 Lorazepam


  (Ativan 2mg/ml


 1ml)  2 mg  Q2H  PRN


 IV


 For Anxiety  1/29/20 12:55


 2/5/20 12:54   


 


 


 Morphine Sulfate


  (Morphine


 Sulfate)  2 mg  Q2H  PRN


 IVP


 For Pain  1/29/20 12:55


 2/5/20 12:54   


 


 


 Nitroglycerin


  (Nitro-Bid)  1 inch  TID@0600,1200,1800


 TOPIC


   1/29/20 18:00


 2/26/20 05:59  1/31/20 05:33


 


 


 Pantoprazole


  (Protonix)  40 mg  DAILY


 ORAL


   1/31/20 09:00


 3/1/20 08:59   


 


 


 Polyethylene


 Glycol


  (Miralax)  17 gm  DAILY


 ORAL


   1/30/20 09:00


 2/29/20 08:59  1/30/20 08:32


 


 


 Prednisone


  (predniSONE)  10 mg  DAILY


 ORAL


   1/30/20 16:15


 2/29/20 16:14  1/30/20 17:12


 


 


 Risperidone


  (RisperDAL)  2 mg  QHS


 ORAL


   1/29/20 21:00


 2/21/20 20:59  1/30/20 21:02


 


 


 Trazodone HCl


  (Desyrel)  100 mg  BEDTIME


 ORAL


   1/29/20 21:00


 2/21/20 20:59  1/30/20 21:01


 

















Nathen Joiner MD Jan 31, 2020 06:56

## 2020-01-31 NOTE — NUR
NURSE NOTES:

Pt provided with bed bath, linen change and oral care. Pt tolerated care well. Pt remains 
resting in bed; bed remains in lowest position with safety wheels engaged, call light within 
reach, bed alarm activated and side rail up x3. Pt is nearest to nursing station. Will 
continue plan of care. Will continue to monitor.

## 2020-01-31 NOTE — NUR
NURSE NOTES:



Delivery arlette/Maurice Rush from [facility] is in the unit, delivered patient's oxygen E tank, 
oxygen E cart, oxygen E regulator and nasal cannula. Placed at patients bedside.

## 2020-01-31 NOTE — NUR
NURSE NOTES:



Dr. Duffy made aware via telephone that [employer] owner said that they don't 
allow oxygen in their residence. Discharge planner Alexandra is aware regarding this matter 
as well.

## 2020-01-31 NOTE — NUR
NURSE NOTES:



Olimpia from [facility] called, and she states that they will deliver oxygen at 
bedside within 2 hours. And they will deliver the rest of DME at patient's address.

## 2020-01-31 NOTE — NUR
NURSE NOTES:

Spoke with Mary from [facility] regarding placement at [facility]. Awaiting 
confirmation of placement. Will follow up and continue plan of care until discharge.

## 2020-01-31 NOTE — NUR
NURSE NOTES:



Dr. Duffy called, and he said that patient got accepted at [facility].

## 2020-01-31 NOTE — NUR
NURSE NOTES:

Received pt from [facility]. Pt is awake and alert x2-3 currently resting in the bed. Pt is 
on NC 3L with no SOB noted, pt denies SOB and pain at this time. No signs or symptoms of 
respiratory distress noted. Pt remains SR on tele monitor with a HR of 72 noted. No sign/sx 
of cardiac distress noted. Right FA 18G IV catheter remains asymptomatic, intact and patent. 
Pt provided with evening snack per pt request. No s/sx of hypoglycemia noted, bedside BG 
reveals BG of 179. Pt remains resting in bed; bed remains in lowest position with safety 
wheels engaged, call light within reach, bed alarm activated and side rail up x3. Pt is 
nearest to nursing station. Will continue plan of care. Will continue to monitor.

## 2020-01-31 NOTE — NUR
CASE MANAGEMENT: REVIEW





01/31/2020



SI:RESPIRATORY FAILURE S/P EXTUBATION

97.9  60  18  123/89  NC 3L 94%

  ANION GAP -2  BUN21  CREAT 0.5

CA 7.8  MAG 1.4    





IS: MAGNESIUM IVPB X4

 STARLIX TIAC

 SS INSULIN

 NITRO BID TID

 RISPERADOL PO QHS

 LEVEMIR SUBQ QHS

 ASA QD

 COLACE QD

 MIRALAX QD

 PREDNISONE QD

 PROTONIX PO QD

 HALDOL IM Q4H PRN

 ATIVAN IV Q2H PRN

 MORPHINE IV Q2H PRN







~~~~SDU- [address] [place]

## 2020-01-31 NOTE — PULMONOLOGY PROGRESS NOTE
Assessment/Plan


Assessment/Plan


IMPRESSION:


1. Hypoglycemia.  resolved


2. Metabolic encephalopathy. Resolved


3. Chronic obstructive pulmonary disease with exacerbation. Now extubated 


4. TRoponin leak


5. Hyponatremia. Corrected


6.Acute on chronic congestive heart failure, possibly systolic and


diastolic.





PLAN:


1. Now extubated


2. Wean down O2; 


3. Anti-platelet therapy.


4. Off Fentanyl


5. Current present care


6. DVT prophylaxis.


7. Empiric antimicrobials.


8. Swallow eval noted


9/ DC planning to SNF vs  facility

















  ______________________________________________


True Duffy M.D.





Subjective


Interval Events:  Feeling better


Constitutional:  Reports: no symptoms


HEENT:  Repors: no symptoms


Respiratory:  Reports: no symptoms


Cardiovascular:  Reports: no symptoms


Allergies:  


Coded Allergies:  


     No Known Allergies (Unverified , 1/16/18)





Objective





Last 24 Hour Vital Signs








  Date Time  Temp Pulse Resp B/P (MAP) Pulse Ox O2 Delivery O2 Flow Rate FiO2


 


1/31/20 09:14     96 Nasal Cannula 5.0 40


 


1/31/20 08:06  62      


 


1/31/20 08:00      Nasal Cannula 5.0 


 


1/31/20 08:00       5.0 


 


1/31/20 08:00 97.9 64 20 120/81 (94) 96   


 


1/31/20 05:33    148/78    


 


1/31/20 04:00 97.7 68 20 146/83 (104) 95   


 


1/31/20 04:00      Nasal Cannula 5.0 


 


1/31/20 04:00       5.0 


 


1/31/20 03:37  68      


 


1/31/20 00:00 97.5 76 20 120/73 (89) 95   


 


1/31/20 00:00      Nasal Cannula 5.0 


 


1/30/20 23:34  72      


 


1/30/20 20:00      Nasal Cannula 5.0 


 


1/30/20 20:00       5.0 


 


1/30/20 20:00 97.8 71 20 121/83 (96) 95   


 


1/30/20 19:57     95 Nasal Cannula 5.0 40


 


1/30/20 19:43  64      


 


1/30/20 17:12    129/87    


 


1/30/20 16:00       5.0 


 


1/30/20 16:00 97.4 62 18 129/87 (101) 98   


 


1/30/20 16:00      Nasal Cannula 5.0 


 


1/30/20 15:26  60      


 


1/30/20 12:55    121/83    


 


1/30/20 12:00       5.0 


 


1/30/20 12:00 97.8 59 18 121/83 (96) 94   


 


1/30/20 12:00      Nasal Cannula 5.0 


 


1/30/20 11:42  59      

















Intake and Output  


 


 1/30/20 1/31/20





 19:00 07:00


 


Intake Total 420 ml 650 ml


 


Output Total 600 ml 700 ml


 


Balance -180 ml -50 ml


 


  


 


Intake Oral 420 ml 650 ml


 


Output Urine Total 600 ml 700 ml








General Appearance:  no acute distress


HEENT:  normocephalic


Respiratory/Chest:  chest wall non-tender, lungs clear


Cardiovascular:  normal peripheral pulses


Abdomen:  normal bowel sounds


Laboratory Tests


1/31/20 03:40: 


White Blood Count 10.6, Red Blood Count 6.34H, Hemoglobin 16.7, Hematocrit 53.6H

, Mean Corpuscular Volume 85, Mean Corpuscular Hemoglobin 26.4L, Mean 

Corpuscular Hemoglobin Concent 31.1L, Red Cell Distribution Width 17.6H, 

Platelet Count 135L, Mean Platelet Volume 7.1, Neutrophils (%) (Auto) 81.6H, 

Lymphocytes (%) (Auto) 11.5L, Monocytes (%) (Auto) 6.3, Eosinophils (%) (Auto) 

0.0, Basophils (%) (Auto) 0.6, Sodium Level 136, Potassium Level 4.8, Chloride 

Level 100, Carbon Dioxide Level 38H, Anion Gap -2L, Blood Urea Nitrogen 21H, 

Creatinine 0.5L, Estimat Glomerular Filtration Rate > 60, Glucose Level 144H, 

Calcium Level 7.8L, Phosphorus Level 3.8, Magnesium Level 1.4L, Total Bilirubin 

1.5H, Direct Bilirubin 0.5H, Aspartate Amino Transf (AST/SGOT) 18, Alanine 

Aminotransferase (ALT/SGPT) 131H, Alkaline Phosphatase 69, Total Protein 4.9L, 

Albumin 2.3L, Globulin 2.6, Albumin/Globulin Ratio 0.9L





Current Medications








 Medications


  (Trade)  Dose


 Ordered  Sig/German


 Route


 PRN Reason  Start Time


 Stop Time Status Last Admin


Dose Admin


 


 Acetaminophen


  (Tylenol)  650 mg  Q6H  PRN


 ORAL


 Mild Pain/Temp > 100.5  1/29/20 12:54


 2/28/20 12:53  1/30/20 22:06


 


 


 Aspirin


  (ASA)  81 mg  DAILY


 ORAL


   1/30/20 09:00


 2/21/20 08:59  1/31/20 09:22


 


 


 Dextrose


  (Dextrose 50%)  25 ml  Q30M  PRN


 IV


 Hypoglycemia  1/29/20 12:45


 2/23/20 07:14   


 


 


 Dextrose


  (Dextrose 50%)  50 ml  Q30M  PRN


 IV


 Hypoglycemia  1/29/20 12:45


 2/23/20 07:14   


 


 


 Docusate Sodium


  (Colace)  100 mg  DAILY


 ORAL


   1/30/20 09:00


 2/29/20 08:59  1/30/20 08:32


 


 


 Guaifenesin/


 Dextromethorphan


  (Robitussin DM


 Syrup)  5 ml  Q6H  PRN


 ORAL


 For Cough  1/29/20 12:54


 2/28/20 12:53   


 


 


 Haloperidol


 Lactate


  (Haldol)  5 mg  Q4H  PRN


 IM


 Agitation  1/29/20 12:54


 2/28/20 12:53   


 


 


 Insulin Aspart


  (NovoLOG)    BEFORE MEALS AND  HS


 SUBQ


   1/29/20 16:30


 2/23/20 20:59  1/30/20 21:03


 


 


 Insulin Detemir


  (Levemir)  10 units  QHS


 SUBQ


   1/31/20 21:00


 2/25/20 20:59   


 


 


 Lorazepam


  (Ativan 2mg/ml


 1ml)  2 mg  Q2H  PRN


 IV


 For Anxiety  1/29/20 12:55


 2/5/20 12:54   


 


 


 Magnesium Sulfate  100 ml @ 


 100 mls/hr  Q1H


 IVPB


   1/31/20 09:30


 1/31/20 13:29  1/31/20 10:00


 


 


 Morphine Sulfate


  (Morphine


 Sulfate)  2 mg  Q2H  PRN


 IVP


 For Pain  1/29/20 12:55


 2/5/20 12:54   


 


 


 Nateglinide


  (Starlix)  60 mg  TIAC


 ORAL


   1/31/20 07:15


 3/1/20 07:14   


 


 


 Nitroglycerin


  (Nitro-Bid)  1 inch  TID@0600,1200,1800


 TOPIC


   1/29/20 18:00


 2/26/20 05:59  1/31/20 05:33


 


 


 Pantoprazole


  (Protonix)  40 mg  DAILY


 ORAL


   1/31/20 09:00


 3/1/20 08:59  1/31/20 09:22


 


 


 Polyethylene


 Glycol


  (Miralax)  17 gm  DAILY


 ORAL


   1/30/20 09:00


 2/29/20 08:59  1/30/20 08:32


 


 


 Prednisone


  (predniSONE)  10 mg  DAILY


 ORAL


   1/30/20 16:15


 2/29/20 16:14  1/31/20 09:23


 


 


 Risperidone


  (RisperDAL)  2 mg  QHS


 ORAL


   1/29/20 21:00


 2/21/20 20:59  1/30/20 21:02


 


 


 Trazodone HCl


  (Desyrel)  100 mg  BEDTIME


 ORAL


   1/29/20 21:00


 2/21/20 20:59  1/30/20 21:01


 

















True Duffy MD Jan 31, 2020 11:16

## 2020-01-31 NOTE — NUR
NURSE NOTES:

Spoke with Mary from [facility] regarding placement at [facility]. [facility] not able to accept patient until tomorrow. [facility] not able to confirm 
room number or time. Will continue plan of care and informed charge RN about d/c plans for 
tomorrow.

## 2020-01-31 NOTE — NUR
NURSE NOTES:

pt had BM on the bed and the commode, changed whole bed with clean new gown and sheet. 
provided oral care.  pt states " I want to go home" pt is restless little bit, but follows 
commands. call light within reach. bed at the lowest position

## 2020-01-31 NOTE — NUR
NURSE NOTES:



Spoke to Dr. Duffy via telephone. Informed MD that patient is not eating because the food 
doesn't look appetizing. He is currently on mechanical soft chopped. Patient is demanding 
for sandwich. MD said to change it to Regular consistency.

## 2020-01-31 NOTE — CARDIOLOGY PROGRESS NOTE
Assessment/Plan


Problem List:  


(1) Atrial flutter


(2) COPD exacerbation


(3) Elevated troponin


(4) Schizophrenia


(5) Hypoglycemia


(6) Diabetes mellitus


Status:  stable, progressing


Status Narrative


Pt w/ COPD, schizophrenia, and type II DM, adm w/ LOC in setting of low 

glucose. He had  COPD exacerbation, and during acute resp failure, AFL w/ 1:1 

conduction, ventricular rate 250 bpm in  . Respiratory status has improved, and 

he was successfully extubated.  AFL resolved w/ iv metoprolol and digoxin. He 

is maintaining SR


ECHO w/ pulm hypertension, RV dilation, hypokinesis.   No evidence of PE on CTA 

and no DVT on venous duplex. 


He had mild troponin elevation following the AFL episode,  and EKG showing deep 

T inversion inf and anteriorly - c/w nonstemi.


Assessment/Plan


He is currently off antiarrhythmic agents. Would start b blocker if no 

contraindication from pulm standpoint, for rate control if AFL recurs.Would not 

anticoagulate as CHADS VASC score is 1. 


Continue asa  for CAD.  LDL < 70  


Continue to wean off supplemental 02, keeping sats > 90%


Consider stress nuclear study as outpt  


Rx for copd exacerbation per primary team.





Subjective


ROS Limited/Unobtainable:  No


Subjective


Cardiology for Dr. Murphy





Pt comfortable, on nc 02.  no chest pain or palpitations





Objective





Last 24 Hour Vital Signs








  Date Time  Temp Pulse Resp B/P (MAP) Pulse Ox O2 Delivery O2 Flow Rate FiO2


 


1/31/20 17:27    123/89    


 


1/31/20 16:00      Nasal Cannula 3.0 


 


1/31/20 16:00 97.8 66 18 124/84 (97) 93   


 


1/31/20 15:35  74      


 


1/31/20 12:32    123/89    


 


1/31/20 12:00 97.9 60 18 123/89 (100) 94   


 


1/31/20 12:00      Nasal Cannula 3.0 


 


1/31/20 11:32  61      


 


1/31/20 09:14     96 Nasal Cannula 5.0 40


 


1/31/20 08:06  62      


 


1/31/20 08:00      Nasal Cannula 5.0 


 


1/31/20 08:00       5.0 


 


1/31/20 08:00 97.9 64 20 120/81 (94) 96   


 


1/31/20 05:33    148/78    


 


1/31/20 04:00 97.7 68 20 146/83 (104) 95   


 


1/31/20 04:00      Nasal Cannula 5.0 


 


1/31/20 04:00       5.0 


 


1/31/20 03:37  68      


 


1/31/20 00:00 97.5 76 20 120/73 (89) 95   


 


1/31/20 00:00      Nasal Cannula 5.0 


 


1/30/20 23:34  72      


 


1/30/20 20:00      Nasal Cannula 5.0 


 


1/30/20 20:00       5.0 


 


1/30/20 20:00 97.8 71 20 121/83 (96) 95   


 


1/30/20 19:57     95 Nasal Cannula 5.0 40


 


1/30/20 19:43  64      








General Appearance:  WD/WN, no apparent distress, alert


EENT:  PERRL/EOMI


Neck:  no JVD


Rhythm:  NSR


Cardiovascular:  normal rate, regular rhythm


Respiratory/Chest:  other - occ expir wheeze


Abdomen:  normal bowel sounds, non tender, soft


Extremities:  no swelling











Intake and Output  


 


 1/30/20 1/31/20





 19:00 07:00


 


Intake Total 420 ml 650 ml


 


Output Total 600 ml 700 ml


 


Balance -180 ml -50 ml


 


  


 


Intake Oral 420 ml 650 ml


 


Output Urine Total 600 ml 700 ml











Laboratory Tests








Test


  1/31/20


03:40


 


White Blood Count


  10.6 K/UL


(4.8-10.8)


 


Red Blood Count


  6.34 M/UL


(4.70-6.10)  H


 


Hemoglobin


  16.7 G/DL


(14.2-18.0)


 


Hematocrit


  53.6 %


(42.0-52.0)  H


 


Mean Corpuscular Volume 85 FL (80-99)  


 


Mean Corpuscular Hemoglobin


  26.4 PG


(27.0-31.0)  L


 


Mean Corpuscular Hemoglobin


Concent 31.1 G/DL


(32.0-36.0)  L


 


Red Cell Distribution Width


  17.6 %


(11.6-14.8)  H


 


Platelet Count


  135 K/UL


(150-450)  L


 


Mean Platelet Volume


  7.1 FL


(6.5-10.1)


 


Neutrophils (%) (Auto)


  81.6 %


(45.0-75.0)  H


 


Lymphocytes (%) (Auto)


  11.5 %


(20.0-45.0)  L


 


Monocytes (%) (Auto)


  6.3 %


(1.0-10.0)


 


Eosinophils (%) (Auto)


  0.0 %


(0.0-3.0)


 


Basophils (%) (Auto)


  0.6 %


(0.0-2.0)


 


Sodium Level


  136 MMOL/L


(136-145)


 


Potassium Level


  4.8 MMOL/L


(3.5-5.1)


 


Chloride Level


  100 MMOL/L


()


 


Carbon Dioxide Level


  38 MMOL/L


(21-32)  H


 


Anion Gap


  -2 mmol/L


(5-15)  L


 


Blood Urea Nitrogen


  21 mg/dL


(7-18)  H


 


Creatinine


  0.5 MG/DL


(0.55-1.30)  L


 


Estimat Glomerular Filtration


Rate > 60 mL/min


(>60)


 


Glucose Level


  144 MG/DL


()  H


 


Calcium Level


  7.8 MG/DL


(8.5-10.1)  L


 


Phosphorus Level


  3.8 MG/DL


(2.5-4.9)


 


Magnesium Level


  1.4 MG/DL


(1.8-2.4)  L


 


Total Bilirubin


  1.5 MG/DL


(0.2-1.0)  H


 


Direct Bilirubin


  0.5 MG/DL


(0.0-0.3)  H


 


Aspartate Amino Transf


(AST/SGOT) 18 U/L (15-37)


 


 


Alanine Aminotransferase


(ALT/SGPT) 131 U/L


(12-78)  H


 


Alkaline Phosphatase


  69 U/L


()


 


Total Protein


  4.9 G/DL


(6.4-8.2)  L


 


Albumin


  2.3 G/DL


(3.4-5.0)  L


 


Globulin 2.6 g/dL  


 


Albumin/Globulin Ratio


  0.9 (1.0-2.7)


Moni Bain MD Jan 31, 2020 18:41

## 2020-01-31 NOTE — NUR
NURSE NOTES:



Titrated patient's oxygen level to 3LPM from 5LPM per Dr. Duffy. Will continue to monitor 
oxygen saturation. Current oxygen saturation is 93%.

## 2020-01-31 NOTE — NUR
NURSE NOTES:



Received patient in bed. In no apparent distress. Awake, talkative, able to make some needs 
known. Bed alarm on. Bed in lowest position. Will continue plan of care.

## 2020-02-01 VITALS — DIASTOLIC BLOOD PRESSURE: 60 MMHG | SYSTOLIC BLOOD PRESSURE: 124 MMHG

## 2020-02-01 VITALS — SYSTOLIC BLOOD PRESSURE: 114 MMHG | DIASTOLIC BLOOD PRESSURE: 80 MMHG

## 2020-02-01 VITALS — SYSTOLIC BLOOD PRESSURE: 120 MMHG | DIASTOLIC BLOOD PRESSURE: 75 MMHG

## 2020-02-01 VITALS — SYSTOLIC BLOOD PRESSURE: 116 MMHG | DIASTOLIC BLOOD PRESSURE: 78 MMHG

## 2020-02-01 VITALS — DIASTOLIC BLOOD PRESSURE: 70 MMHG | SYSTOLIC BLOOD PRESSURE: 123 MMHG

## 2020-02-01 LAB
ADD MANUAL DIFF: NO
ALBUMIN SERPL-MCNC: 2.4 G/DL (ref 3.4–5)
ALBUMIN/GLOB SERPL: 0.9 {RATIO} (ref 1–2.7)
ALP SERPL-CCNC: 78 U/L (ref 46–116)
ALT SERPL-CCNC: 129 U/L (ref 12–78)
ANION GAP SERPL CALC-SCNC: -2 MMOL/L (ref 5–15)
AST SERPL-CCNC: 21 U/L (ref 15–37)
BASOPHILS NFR BLD AUTO: 0.5 % (ref 0–2)
BILIRUB DIRECT SERPL-MCNC: 0.4 MG/DL (ref 0–0.3)
BILIRUB SERPL-MCNC: 1.3 MG/DL (ref 0.2–1)
BUN SERPL-MCNC: 15 MG/DL (ref 7–18)
CALCIUM SERPL-MCNC: 7.5 MG/DL (ref 8.5–10.1)
CHLORIDE SERPL-SCNC: 96 MMOL/L (ref 98–107)
CO2 SERPL-SCNC: 41 MMOL/L (ref 21–32)
CREAT SERPL-MCNC: 0.7 MG/DL (ref 0.55–1.3)
EOSINOPHIL NFR BLD AUTO: 0.4 % (ref 0–3)
ERYTHROCYTE [DISTWIDTH] IN BLOOD BY AUTOMATED COUNT: 17.4 % (ref 11.6–14.8)
GLOBULIN SER-MCNC: 2.6 G/DL
HCT VFR BLD CALC: 54.2 % (ref 42–52)
HGB BLD-MCNC: 16.9 G/DL (ref 14.2–18)
LYMPHOCYTES NFR BLD AUTO: 14 % (ref 20–45)
MCV RBC AUTO: 83 FL (ref 80–99)
MONOCYTES NFR BLD AUTO: 8.2 % (ref 1–10)
NEUTROPHILS NFR BLD AUTO: 76.8 % (ref 45–75)
PLATELET # BLD: 111 K/UL (ref 150–450)
POTASSIUM SERPL-SCNC: 4.2 MMOL/L (ref 3.5–5.1)
RBC # BLD AUTO: 6.5 M/UL (ref 4.7–6.1)
SODIUM SERPL-SCNC: 135 MMOL/L (ref 136–145)
WBC # BLD AUTO: 10.5 K/UL (ref 4.8–10.8)

## 2020-02-01 RX ADMIN — DOCUSATE SODIUM SCH MG: 100 CAPSULE, LIQUID FILLED ORAL at 09:00

## 2020-02-01 RX ADMIN — NITROGLYCERIN SCH INCH: 20 OINTMENT TOPICAL at 05:33

## 2020-02-01 RX ADMIN — NATEGLINIDE SCH MG: 60 TABLET ORAL at 05:32

## 2020-02-01 RX ADMIN — ASPIRIN 81 MG SCH MG: 81 TABLET ORAL at 09:18

## 2020-02-01 RX ADMIN — INSULIN ASPART SCH UNITS: 100 INJECTION, SOLUTION INTRAVENOUS; SUBCUTANEOUS at 11:30

## 2020-02-01 RX ADMIN — POLYETHYLENE GLYCOL 3350 SCH GM: 17 POWDER, FOR SOLUTION ORAL at 09:00

## 2020-02-01 RX ADMIN — INSULIN ASPART SCH UNITS: 100 INJECTION, SOLUTION INTRAVENOUS; SUBCUTANEOUS at 05:34

## 2020-02-01 RX ADMIN — NITROGLYCERIN SCH INCH: 20 OINTMENT TOPICAL at 12:25

## 2020-02-01 RX ADMIN — NATEGLINIDE SCH MG: 60 TABLET ORAL at 11:42

## 2020-02-01 NOTE — NEPHROLOGY PROGRESS NOTE
Assessment/Plan


Problem List:  


(1) Electrolyte imbalance


(2) Acute respiratory failure


(3) Elevated troponin


(4) Diabetes mellitus


Plan


one dose diamox


DC dee


Mag supplement as needed


now extubated-


per cardiologist


add nitrates


mag and K supplement as needed


taper steroids


per orders





Subjective


ROS Limited/Unobtainable:  No





Objective


Objective





Last 24 Hour Vital Signs








  Date Time  Temp Pulse Resp B/P (MAP) Pulse Ox O2 Delivery O2 Flow Rate FiO2


 


2/1/20 12:25    124/60    


 


2/1/20 08:00      Nasal Cannula 4.0 


 


2/1/20 08:00 97.0 92 21 123/70 (87) 91   


 


2/1/20 05:33    114/80    


 


2/1/20 04:00 97.4 84 20 114/80 (91) 90   


 


2/1/20 04:00      Nasal Cannula 4.0 


 


2/1/20 00:00 97.0 72 20 116/78 (91) 94   


 


2/1/20 00:00      Nasal Cannula 4.0 


 


1/31/20 23:34  65      


 


1/31/20 20:00      Nasal Cannula 4.0 


 


1/31/20 20:00 97.9 72 20 134/93 (107) 92   


 


1/31/20 19:38  71      


 


1/31/20 19:16     93 Nasal Cannula 5.0 40


 


1/31/20 17:27    123/89    


 


1/31/20 16:00      Nasal Cannula 3.0 


 


1/31/20 16:00 97.8 66 18 124/84 (97) 93   


 


1/31/20 15:35  74      

















Intake and Output  


 


 1/31/20 2/1/20





 19:00 07:00


 


Intake Total 880 ml 700 ml


 


Output Total 1050 ml 1750 ml


 


Balance -170 ml -1050 ml


 


  


 


Intake Oral 480 ml 700 ml


 


IV Total 400 ml 


 


Output Urine Total 1050 ml 1750 ml


 


# Bowel Movements 2 








Laboratory Tests


2/1/20 03:41: 


White Blood Count 10.5, Red Blood Count 6.50H, Hemoglobin 16.9, Hematocrit 54.2H

, Mean Corpuscular Volume 83, Mean Corpuscular Hemoglobin 26.0L, Mean 

Corpuscular Hemoglobin Concent 31.2L, Red Cell Distribution Width 17.4H, 

Platelet Count 111L, Mean Platelet Volume 7.3, Neutrophils (%) (Auto) 76.8H, 

Lymphocytes (%) (Auto) 14.0L, Monocytes (%) (Auto) 8.2, Eosinophils (%) (Auto) 

0.4, Basophils (%) (Auto) 0.5, Sodium Level 135L, Potassium Level 4.2, Chloride 

Level 96L, Carbon Dioxide Level 41*H, Anion Gap -2L, Blood Urea Nitrogen 15, 

Creatinine 0.7, Estimat Glomerular Filtration Rate > 60, Glucose Level 151H, 

Calcium Level 7.5L, Total Bilirubin 1.3H, Direct Bilirubin 0.4H, Aspartate 

Amino Transf (AST/SGOT) 21, Alanine Aminotransferase (ALT/SGPT) 129H, Alkaline 

Phosphatase 78, Total Protein 5.0L, Albumin 2.4L, Globulin 2.6, Albumin/

Globulin Ratio 0.9L


Height (Feet):  6


Height (Inches):  3.00


Weight (Pounds):  238


General Appearance:  no apparent distress


Cardiovascular:  normal rate


Respiratory/Chest:  decreased breath sounds


Abdomen:  distended


Objective


no change











Lawson Vergara MD Feb 1, 2020 12:50

## 2020-02-01 NOTE — NUR
NURSE NOTES:

Patient continues to ask when he is going to nursing facility. Patient anxious to get out of 
the hospital. Patient restless and walking around the room. Gait steady. Vital signs stable. 
Will continue to monitor and follow up with nursing facility. 



Dr Duffy rounded on the patient and ordered for patient to be transferred to Med-Surg if 
patient unable to discharged today. Order read back, verified, and placed.

## 2020-02-01 NOTE — NUR
NURSE NOTES:

[facility], [place] to accept patient today but unable to confirm room number or time. Will 
continue plan of care and informed charge RN about d/c plan.

## 2020-02-01 NOTE — GENERAL PROGRESS NOTE
Assessment/Plan


Problem List:  


(1) Diabetes mellitus


ICD Codes:  E11.9 - Type 2 diabetes mellitus without complications


SNOMED:  44488387


(2) Hypoglycemia


ICD Codes:  E16.2 - Hypoglycemia, unspecified


SNOMED:  083814696


(3) Schizophrenia


ICD Codes:  F20.9 - Schizophrenia, unspecified


SNOMED:  22993429


Qualifiers:  


   Qualified Codes:  F20.9 - Schizophrenia, unspecified


(4) Elevated troponin


ICD Codes:  R79.89 - Other specified abnormal findings of blood chemistry


SNOMED:  838829718, 786489258, 460789932


(5) COPD exacerbation


ICD Codes:  J44.1 - Chronic obstructive pulmonary disease with (acute) 

exacerbation


SNOMED:  389159730


(6) Abnormal LFTs


ICD Codes:  R94.5 - Abnormal results of liver function studies


SNOMED:  803324974


Status:  stable, progressing


Assessment/Plan:


continue Levemir 10 units qhs 


continue Starlix 60 mg ac tid 


continue NISS ac / hs





Subjective


Allergies:  


Coded Allergies:  


     No Known Allergies (Unverified , 1/16/18)


All Systems:  reviewed and negative except above


Subjective


events noted 


stable glucose values without hypoglycemia 














Item Value  Date Time


 


Bedside Blood Glucose 129 mg/dl H 2/1/20 1130


 


Bedside Blood Glucose 179 mg/dl H 2/1/20 0630


 


Bedside Blood Glucose 179 mg/dl H 1/31/20 2100


 


Bedside Blood Glucose 150 mg/dl H 1/31/20 1719


 


Bedside Blood Glucose 149 mg/dl H 1/31/20 1210











Objective





Last 24 Hour Vital Signs








  Date Time  Temp Pulse Resp B/P (MAP) Pulse Ox O2 Delivery O2 Flow Rate FiO2


 


2/1/20 12:25    124/60    


 


2/1/20 08:00      Nasal Cannula 4.0 


 


2/1/20 08:00 97.0 92 21 123/70 (87) 91   


 


2/1/20 05:33    114/80    


 


2/1/20 04:00 97.4 84 20 114/80 (91) 90   


 


2/1/20 04:00      Nasal Cannula 4.0 


 


2/1/20 00:00 97.0 72 20 116/78 (91) 94   


 


2/1/20 00:00      Nasal Cannula 4.0 


 


1/31/20 23:34  65      


 


1/31/20 20:00      Nasal Cannula 4.0 


 


1/31/20 20:00 97.9 72 20 134/93 (107) 92   


 


1/31/20 19:38  71      


 


1/31/20 19:16     93 Nasal Cannula 5.0 40


 


1/31/20 17:27    123/89    


 


1/31/20 16:00      Nasal Cannula 3.0 


 


1/31/20 16:00 97.8 66 18 124/84 (97) 93   


 


1/31/20 15:35  74      

















Intake and Output  


 


 1/31/20 2/1/20





 19:00 07:00


 


Intake Total 880 ml 700 ml


 


Output Total 1050 ml 1750 ml


 


Balance -170 ml -1050 ml


 


  


 


Intake Oral 480 ml 700 ml


 


IV Total 400 ml 


 


Output Urine Total 1050 ml 1750 ml


 


# Bowel Movements 2 








Laboratory Tests


2/1/20 03:41: 


White Blood Count 10.5, Red Blood Count 6.50H, Hemoglobin 16.9, Hematocrit 54.2H

, Mean Corpuscular Volume 83, Mean Corpuscular Hemoglobin 26.0L, Mean 

Corpuscular Hemoglobin Concent 31.2L, Red Cell Distribution Width 17.4H, 

Platelet Count 111L, Mean Platelet Volume 7.3, Neutrophils (%) (Auto) 76.8H, 

Lymphocytes (%) (Auto) 14.0L, Monocytes (%) (Auto) 8.2, Eosinophils (%) (Auto) 

0.4, Basophils (%) (Auto) 0.5, Sodium Level 135L, Potassium Level 4.2, Chloride 

Level 96L, Carbon Dioxide Level 41*H, Anion Gap -2L, Blood Urea Nitrogen 15, 

Creatinine 0.7, Estimat Glomerular Filtration Rate > 60, Glucose Level 151H, 

Calcium Level 7.5L, Magnesium Level 1.5L, Total Bilirubin 1.3H, Direct 

Bilirubin 0.4H, Aspartate Amino Transf (AST/SGOT) 21, Alanine Aminotransferase (

ALT/SGPT) 129H, Alkaline Phosphatase 78, Total Protein 5.0L, Albumin 2.4L, 

Globulin 2.6, Albumin/Globulin Ratio 0.9L


Height (Feet):  6


Height (Inches):  3.00


Weight (Pounds):  238


General Appearance:  no apparent distress


Neck:  normal alignment


Cardiovascular:  normal rate


Respiratory/Chest:  lungs clear


Abdomen:  normal bowel sounds


Pelvis:  normal external exam


Objective





Current Medications








 Medications


  (Trade)  Dose


 Ordered  Sig/German


 Route


 PRN Reason  Start Time


 Stop Time Status Last Admin


Dose Admin


 


 Acetaminophen


  (Tylenol)  650 mg  Q6H  PRN


 ORAL


 Mild Pain/Temp > 100.5  1/29/20 12:54


 2/28/20 12:53  1/30/20 22:06


 


 


 Acetazolamide


  (Diamox)  250 mg  ONCE


 ORAL


   2/1/20 13:00


 2/1/20 14:30  2/1/20 13:21


 


 


 Aspirin


  (ASA)  81 mg  DAILY


 ORAL


   1/30/20 09:00


 2/21/20 08:59  2/1/20 09:18


 


 


 Dextrose


  (Dextrose 50%)  25 ml  Q30M  PRN


 IV


 Hypoglycemia  1/29/20 12:45


 2/23/20 07:14   


 


 


 Dextrose


  (Dextrose 50%)  50 ml  Q30M  PRN


 IV


 Hypoglycemia  1/29/20 12:45


 2/23/20 07:14   


 


 


 Docusate Sodium


  (Colace)  100 mg  DAILY


 ORAL


   1/30/20 09:00


 2/29/20 08:59  1/30/20 08:32


 


 


 Guaifenesin/


 Dextromethorphan


  (Robitussin DM


 Syrup)  5 ml  Q6H  PRN


 ORAL


 For Cough  1/29/20 12:54


 2/28/20 12:53   


 


 


 Haloperidol


 Lactate


  (Haldol)  5 mg  Q4H  PRN


 IM


 Agitation  1/29/20 12:54


 2/28/20 12:53   


 


 


 Insulin Aspart


  (NovoLOG)    BEFORE MEALS AND  HS


 SUBQ


   1/29/20 16:30


 2/23/20 20:59  2/1/20 05:34


 


 


 Insulin Detemir


  (Levemir)  10 units  QHS


 SUBQ


   1/31/20 21:00


 2/25/20 20:59  1/31/20 20:14


 


 


 Lorazepam


  (Ativan 2mg/ml


 1ml)  2 mg  Q2H  PRN


 IV


 For Anxiety  1/29/20 12:55


 2/5/20 12:54   


 


 


 Morphine Sulfate


  (Morphine


 Sulfate)  2 mg  Q2H  PRN


 IVP


 For Pain  1/29/20 12:55


 2/5/20 12:54   


 


 


 Nateglinide


  (Starlix)  60 mg  TIAC


 ORAL


   1/31/20 07:15


 3/1/20 07:14  2/1/20 11:42


 


 


 Nitroglycerin


  (Nitro-Bid)  1 inch  TID@0600,1200,1800


 TOPIC


   1/29/20 18:00


 2/26/20 05:59  2/1/20 12:25


 


 


 Pantoprazole


  (Protonix)  40 mg  DAILY


 ORAL


   1/31/20 09:00


 3/1/20 08:59  2/1/20 09:17


 


 


 Polyethylene


 Glycol


  (Miralax)  17 gm  DAILY


 ORAL


   1/30/20 09:00


 2/29/20 08:59  1/30/20 08:32


 


 


 Prednisone


  (predniSONE)  10 mg  DAILY


 ORAL


   1/30/20 16:15


 2/29/20 16:14  2/1/20 09:17


 


 


 Risperidone


  (RisperDAL)  2 mg  QHS


 ORAL


   1/29/20 21:00


 2/21/20 20:59  1/31/20 20:11


 


 


 Trazodone HCl


  (Desyrel)  100 mg  BEDTIME


 ORAL


   1/29/20 21:00


 2/21/20 20:59  1/31/20 20:11


 

















Nathen Joiner MD Feb 1, 2020 13:32

## 2020-02-01 NOTE — NUR
NURSE NOTES:

Still no follow up call from from [facility]. Called [facility] and ambulance will be ready for pick 
in 1 hour 30 minutes.

## 2020-02-01 NOTE — NUR
NURSE NOTES:

Patient remains alert to name, time, and place. Patient remains restless and anxious to be 
discharged. Still awaiting bed assignment at [facility]. patient refusing to wear 
cardiac monitor Patient on 4L nasal cannula. Patient denies shortness of breath. Patient 
ambulates to the restroom. Patient steady on his feet. Patient skin intact. Peripheral IV no 
longer in place. IV discontinued per patient. Patient bed in low position with bed alarm on 
and call light in reach. Will continue to monitor. Patient sitting up in chair eating lunch 
at this time.

## 2020-02-01 NOTE — PULMONOLOGY PROGRESS NOTE
Assessment/Plan


Assessment/Plan


IMPRESSION:


1. Hypoglycemia.  resolved


2. Metabolic encephalopathy. Resolved


3. Chronic obstructive pulmonary disease with exacerbation. Now extubated 


4. TRoponin leak


5. Hyponatremia. Corrected


6.Acute on chronic congestive heart failure, possibly systolic and


diastolic.





PLAN:


1. Now extubated


2. Wean down O2; currently on low flow nasal prongs


3. Anti-platelet therapy.


4. Off Fentanyl


5. Current present care


6. DVT prophylaxis.


7. Empiric antimicrobials.


8. Swallow eval noted; on regular diet


9/ DC planning to SNF vs  facility today

















  ______________________________________________


True Duffy M.D.





Subjective


Interval Events:  None new


Constitutional:  Reports: no symptoms


HEENT:  Repors: no symptoms


Respiratory:  Reports: no symptoms


Cardiovascular:  Reports: no symptoms


Allergies:  


Coded Allergies:  


     No Known Allergies (Unverified , 1/16/18)





Objective





Last 24 Hour Vital Signs








  Date Time  Temp Pulse Resp B/P (MAP) Pulse Ox O2 Delivery O2 Flow Rate FiO2


 


2/1/20 08:00      Nasal Cannula 4.0 


 


2/1/20 08:00 97.0 92 21 123/70 (87) 91   


 


2/1/20 05:33    114/80    


 


2/1/20 04:00 97.4 84 20 114/80 (91) 90   


 


2/1/20 04:00      Nasal Cannula 4.0 


 


2/1/20 00:00 97.0 72 20 116/78 (91) 94   


 


2/1/20 00:00      Nasal Cannula 4.0 


 


1/31/20 23:34  65      


 


1/31/20 20:00      Nasal Cannula 4.0 


 


1/31/20 20:00 97.9 72 20 134/93 (107) 92   


 


1/31/20 19:38  71      


 


1/31/20 19:16     93 Nasal Cannula 5.0 40


 


1/31/20 17:27    123/89    


 


1/31/20 16:00      Nasal Cannula 3.0 


 


1/31/20 16:00 97.8 66 18 124/84 (97) 93   


 


1/31/20 15:35  74      


 


1/31/20 12:32    123/89    


 


1/31/20 12:00 97.9 60 18 123/89 (100) 94   


 


1/31/20 12:00      Nasal Cannula 3.0 


 


1/31/20 11:32  61      

















Intake and Output  


 


 1/31/20 2/1/20





 19:00 07:00


 


Intake Total 880 ml 700 ml


 


Output Total 1050 ml 1750 ml


 


Balance -170 ml -1050 ml


 


  


 


Intake Oral 480 ml 700 ml


 


IV Total 400 ml 


 


Output Urine Total 1050 ml 1750 ml


 


# Bowel Movements 2 








General Appearance:  no acute distress


HEENT:  normocephalic


Respiratory/Chest:  chest wall non-tender, lungs clear


Cardiovascular:  normal peripheral pulses


Abdomen:  normal bowel sounds


Laboratory Tests


2/1/20 03:41: 


White Blood Count 10.5, Red Blood Count 6.50H, Hemoglobin 16.9, Hematocrit 54.2H

, Mean Corpuscular Volume 83, Mean Corpuscular Hemoglobin 26.0L, Mean 

Corpuscular Hemoglobin Concent 31.2L, Red Cell Distribution Width 17.4H, 

Platelet Count 111L, Mean Platelet Volume 7.3, Neutrophils (%) (Auto) 76.8H, 

Lymphocytes (%) (Auto) 14.0L, Monocytes (%) (Auto) 8.2, Eosinophils (%) (Auto) 

0.4, Basophils (%) (Auto) 0.5, Sodium Level 135L, Potassium Level 4.2, Chloride 

Level 96L, Carbon Dioxide Level 41*H, Anion Gap -2L, Blood Urea Nitrogen 15, 

Creatinine 0.7, Estimat Glomerular Filtration Rate > 60, Glucose Level 151H, 

Calcium Level 7.5L, Total Bilirubin 1.3H, Direct Bilirubin 0.4H, Aspartate 

Amino Transf (AST/SGOT) 21, Alanine Aminotransferase (ALT/SGPT) 129H, Alkaline 

Phosphatase 78, Total Protein 5.0L, Albumin 2.4L, Globulin 2.6, Albumin/

Globulin Ratio 0.9L





Current Medications








 Medications


  (Trade)  Dose


 Ordered  Sig/German


 Route


 PRN Reason  Start Time


 Stop Time Status Last Admin


Dose Admin


 


 Acetaminophen


  (Tylenol)  650 mg  Q6H  PRN


 ORAL


 Mild Pain/Temp > 100.5  1/29/20 12:54


 2/28/20 12:53  1/30/20 22:06


 


 


 Aspirin


  (ASA)  81 mg  DAILY


 ORAL


   1/30/20 09:00


 2/21/20 08:59  2/1/20 09:18


 


 


 Dextrose


  (Dextrose 50%)  25 ml  Q30M  PRN


 IV


 Hypoglycemia  1/29/20 12:45


 2/23/20 07:14   


 


 


 Dextrose


  (Dextrose 50%)  50 ml  Q30M  PRN


 IV


 Hypoglycemia  1/29/20 12:45


 2/23/20 07:14   


 


 


 Docusate Sodium


  (Colace)  100 mg  DAILY


 ORAL


   1/30/20 09:00


 2/29/20 08:59  1/30/20 08:32


 


 


 Guaifenesin/


 Dextromethorphan


  (Robitussin DM


 Syrup)  5 ml  Q6H  PRN


 ORAL


 For Cough  1/29/20 12:54


 2/28/20 12:53   


 


 


 Haloperidol


 Lactate


  (Haldol)  5 mg  Q4H  PRN


 IM


 Agitation  1/29/20 12:54


 2/28/20 12:53   


 


 


 Insulin Aspart


  (NovoLOG)    BEFORE MEALS AND  HS


 SUBQ


   1/29/20 16:30


 2/23/20 20:59  2/1/20 05:34


 


 


 Insulin Detemir


  (Levemir)  10 units  QHS


 SUBQ


   1/31/20 21:00


 2/25/20 20:59  1/31/20 20:14


 


 


 Lorazepam


  (Ativan 2mg/ml


 1ml)  2 mg  Q2H  PRN


 IV


 For Anxiety  1/29/20 12:55


 2/5/20 12:54   


 


 


 Morphine Sulfate


  (Morphine


 Sulfate)  2 mg  Q2H  PRN


 IVP


 For Pain  1/29/20 12:55


 2/5/20 12:54   


 


 


 Nateglinide


  (Starlix)  60 mg  TIAC


 ORAL


   1/31/20 07:15


 3/1/20 07:14  2/1/20 05:32


 


 


 Nitroglycerin


  (Nitro-Bid)  1 inch  TID@0600,1200,1800


 TOPIC


   1/29/20 18:00


 2/26/20 05:59  2/1/20 05:33


 


 


 Pantoprazole


  (Protonix)  40 mg  DAILY


 ORAL


   1/31/20 09:00


 3/1/20 08:59  2/1/20 09:17


 


 


 Polyethylene


 Glycol


  (Miralax)  17 gm  DAILY


 ORAL


   1/30/20 09:00


 2/29/20 08:59  1/30/20 08:32


 


 


 Prednisone


  (predniSONE)  10 mg  DAILY


 ORAL


   1/30/20 16:15


 2/29/20 16:14  2/1/20 09:17


 


 


 Risperidone


  (RisperDAL)  2 mg  QHS


 ORAL


   1/29/20 21:00


 2/21/20 20:59  1/31/20 20:11


 


 


 Trazodone HCl


  (Desyrel)  100 mg  BEDTIME


 ORAL


   1/29/20 21:00


 2/21/20 20:59  1/31/20 20:11


 

















True Duffy MD Feb 1, 2020 10:24

## 2020-02-01 NOTE — NUR
NURSE NOTES:

Received patient from MILADIS Israel. Patient alert to name, time, and place. Patient unclear 
on purpose of hospital stay. Patient restless and anxious to be discharged. Awaiting bed 
assignment at [facility]. Patient reported to be sinus rhythm on the cardiac monitor 
but patient refusing to wear monitor at this time. Patient on 3L nasal cannula. Patient 
denies shortness of breath. patient showing no sign of distress. Noted non-productive cough 
but patient denies distress. Patient has urinal at the bedside. Patient skin intact. Patient 
has right forearm 18 gauge peripheral IV that is patent, asymptomatic, and saline locked at 
this time. Patient bed in low position with bed alarm on and call light in reach. Will 
continue to monitor. Patient sitting up in chair at this time.

## 2020-02-01 NOTE — NUR
NURSE NOTES:

Pt refusing to wear leads for cardiac monitoring. Pt educated but continues to refuse. 
Attempted to reconnect patient several times. Will reattempt later. Will continue to 
monitor.

## 2020-02-01 NOTE — NUR
NURSE NOTES:

follow up at [facility] regarding the discharge order of the patient and spoke with 
Gera RASMUSSEN at . Will call me back for bed if available.

## 2020-02-01 NOTE — NUR
HAND-OFF: 

Report given to MILADIS Valentin. Patient denies pain or acute distress. Vital signs stable. 
Patient to be transferred to [facility]. Endorsed to follow up.

## 2020-02-01 NOTE — CARDIOLOGY PROGRESS NOTE
Assessment/Plan


Assessment/Plan


CARDIOLOGY COVERAGE FOR DR. BOLDEN





(1) Atrial flutter, now in sinus rhythm, off antiarrhythmic meds, CHADS score 

of 1 so was not placed on oral anticoag therapy.


(2) COPD exacerbation


(3) Elevated troponin, possibly demand-ischemia.


(4) Schizophrenia


(5) Hypoglycemia


(6) Diabetes mellitus





Subjective


Subjective


Transferred to med-surg unit.


No cardiac events reported.





Objective





Last 24 Hour Vital Signs








  Date Time  Temp Pulse Resp B/P (MAP) Pulse Ox O2 Delivery O2 Flow Rate FiO2


 


2/1/20 16:40 96.0 69 20 120/75 (90) 95   


 


2/1/20 16:00      Nasal Cannula 4.0 


 


2/1/20 12:25    124/60    


 


2/1/20 12:00 98.0 92 22 124/60 (81) 97   


 


2/1/20 12:00      Nasal Cannula 4.0 


 


2/1/20 08:00      Nasal Cannula 4.0 


 


2/1/20 08:00 97.0 92 21 123/70 (87) 91   


 


2/1/20 05:33    114/80    


 


2/1/20 04:00 97.4 84 20 114/80 (91) 90   


 


2/1/20 04:00      Nasal Cannula 4.0 


 


2/1/20 00:00 97.0 72 20 116/78 (91) 94   


 


2/1/20 00:00      Nasal Cannula 4.0 


 


1/31/20 23:34  65      

















Intake and Output  


 


 1/31/20 2/1/20





 19:00 07:00


 


Intake Total 880 ml 700 ml


 


Output Total 1050 ml 1750 ml


 


Balance -170 ml -1050 ml


 


  


 


Intake Oral 480 ml 700 ml


 


IV Total 400 ml 


 


Output Urine Total 1050 ml 1750 ml


 


# Bowel Movements 2 








2D Echo:  EF 50%, RV volume and press. overload, LVH, RVSP 49, Grade I LVDD, BREA

/RVE





Laboratory Tests








Test


  2/1/20


03:41


 


White Blood Count


  10.5 K/UL


(4.8-10.8)


 


Red Blood Count


  6.50 M/UL


(4.70-6.10)  H


 


Hemoglobin


  16.9 G/DL


(14.2-18.0)


 


Hematocrit


  54.2 %


(42.0-52.0)  H


 


Mean Corpuscular Volume 83 FL (80-99)  


 


Mean Corpuscular Hemoglobin


  26.0 PG


(27.0-31.0)  L


 


Mean Corpuscular Hemoglobin


Concent 31.2 G/DL


(32.0-36.0)  L


 


Red Cell Distribution Width


  17.4 %


(11.6-14.8)  H


 


Platelet Count


  111 K/UL


(150-450)  L


 


Mean Platelet Volume


  7.3 FL


(6.5-10.1)


 


Neutrophils (%) (Auto)


  76.8 %


(45.0-75.0)  H


 


Lymphocytes (%) (Auto)


  14.0 %


(20.0-45.0)  L


 


Monocytes (%) (Auto)


  8.2 %


(1.0-10.0)


 


Eosinophils (%) (Auto)


  0.4 %


(0.0-3.0)


 


Basophils (%) (Auto)


  0.5 %


(0.0-2.0)


 


Sodium Level


  135 MMOL/L


(136-145)  L


 


Potassium Level


  4.2 MMOL/L


(3.5-5.1)


 


Chloride Level


  96 MMOL/L


()  L


 


Carbon Dioxide Level


  41 MMOL/L


(21-32)  *H


 


Anion Gap


  -2 mmol/L


(5-15)  L


 


Blood Urea Nitrogen


  15 mg/dL


(7-18)


 


Creatinine


  0.7 MG/DL


(0.55-1.30)


 


Estimat Glomerular Filtration


Rate > 60 mL/min


(>60)


 


Glucose Level


  151 MG/DL


()  H


 


Calcium Level


  7.5 MG/DL


(8.5-10.1)  L


 


Magnesium Level


  1.5 MG/DL


(1.8-2.4)  L


 


Total Bilirubin


  1.3 MG/DL


(0.2-1.0)  H


 


Direct Bilirubin


  0.4 MG/DL


(0.0-0.3)  H


 


Aspartate Amino Transf


(AST/SGOT) 21 U/L (15-37)


 


 


Alanine Aminotransferase


(ALT/SGPT) 129 U/L


(12-78)  H


 


Alkaline Phosphatase


  78 U/L


()


 


Total Protein


  5.0 G/DL


(6.4-8.2)  L


 


Albumin


  2.4 G/DL


(3.4-5.0)  L


 


Globulin 2.6 g/dL  


 


Albumin/Globulin Ratio


  0.9 (1.0-2.7)


L








Objective


General Appearance:  WD/WN, no apparent distress, alert


EENT:  PERRL/EOMI


Neck:  no JVD


Rhythm:  NSR


Cardiovascular:  normal rate, regular rhythm, normal S1S2, no murmurs, gallops 

or rubs.


Respiratory/Chest:  other - occ expir wheeze


Abdomen:  normal bowel sounds, non tender, soft


Extremities:  no edemas, clubbing or cyanosis.











Lawrence Murillo MD Feb 1, 2020 21:36

## 2020-02-01 NOTE — NUR
NURSE NOTES:

Patient was discharged to [facility] accompanied by two ambulance personnel 
from [facility] in stable condition via gurney.V/S stable. Denies any 
pain or discomfort. Not in respiratory distress. No IV. Id was removed. all belongings 
accounted for and given to the patient. Inter facility report given to nurse Lakshmi and 
endorsed plan of care. Skin is intact. Patient's father Darren Hu was informed about the 
discharge. Oxygen tank from home health needs to be returned. Will endorse to the next shift 
charge nurse. No s/s of hypo/hyperglycemia upon discharge.

## 2020-02-01 NOTE — NUR
NURSE NOTES:

Received patient from CINDY.patient is awake, alert and oriented x3. all belongings accounted 
for. Per [facility] nurse ,patient will be discharged to [facility] within a hour. 
Transportation arranged by [facility]. skin is intact. n IV. Per patient he removed 
prior to transfer. Reorientation given about the unit. Call light and personnel items within 
reach. Bed is in lowest position and locked. will continue plan of care.

## 2020-02-01 NOTE — NEPHROLOGY PROGRESS NOTE
Assessment/Plan


Problem List:  


(1) Electrolyte imbalance


(2) Acute respiratory failure


(3) Elevated troponin


(4) Diabetes mellitus


Plan


one dose diamox


DC dee


Mag supplement as needed


now extubated-


per cardiologist


add nitrates


mag and K supplement as needed


taper steroids


per orders





Subjective


ROS Limited/Unobtainable:  No


Constitutional:  Reports: malaise





Objective


Objective





Last 24 Hour Vital Signs








  Date Time  Temp Pulse Resp B/P (MAP) Pulse Ox O2 Delivery O2 Flow Rate FiO2


 


2/1/20 12:25    124/60    


 


2/1/20 08:00      Nasal Cannula 4.0 


 


2/1/20 08:00 97.0 92 21 123/70 (87) 91   


 


2/1/20 05:33    114/80    


 


2/1/20 04:00 97.4 84 20 114/80 (91) 90   


 


2/1/20 04:00      Nasal Cannula 4.0 


 


2/1/20 00:00 97.0 72 20 116/78 (91) 94   


 


2/1/20 00:00      Nasal Cannula 4.0 


 


1/31/20 23:34  65      


 


1/31/20 20:00      Nasal Cannula 4.0 


 


1/31/20 20:00 97.9 72 20 134/93 (107) 92   


 


1/31/20 19:38  71      


 


1/31/20 19:16     93 Nasal Cannula 5.0 40


 


1/31/20 17:27    123/89    


 


1/31/20 16:00      Nasal Cannula 3.0 


 


1/31/20 16:00 97.8 66 18 124/84 (97) 93   


 


1/31/20 15:35  74      

















Intake and Output  


 


 1/31/20 2/1/20





 19:00 07:00


 


Intake Total 880 ml 700 ml


 


Output Total 1050 ml 1750 ml


 


Balance -170 ml -1050 ml


 


  


 


Intake Oral 480 ml 700 ml


 


IV Total 400 ml 


 


Output Urine Total 1050 ml 1750 ml


 


# Bowel Movements 2 








Laboratory Tests


2/1/20 03:41: 


White Blood Count 10.5, Red Blood Count 6.50H, Hemoglobin 16.9, Hematocrit 54.2H

, Mean Corpuscular Volume 83, Mean Corpuscular Hemoglobin 26.0L, Mean 

Corpuscular Hemoglobin Concent 31.2L, Red Cell Distribution Width 17.4H, 

Platelet Count 111L, Mean Platelet Volume 7.3, Neutrophils (%) (Auto) 76.8H, 

Lymphocytes (%) (Auto) 14.0L, Monocytes (%) (Auto) 8.2, Eosinophils (%) (Auto) 

0.4, Basophils (%) (Auto) 0.5, Sodium Level 135L, Potassium Level 4.2, Chloride 

Level 96L, Carbon Dioxide Level 41*H, Anion Gap -2L, Blood Urea Nitrogen 15, 

Creatinine 0.7, Estimat Glomerular Filtration Rate > 60, Glucose Level 151H, 

Calcium Level 7.5L, Total Bilirubin 1.3H, Direct Bilirubin 0.4H, Aspartate 

Amino Transf (AST/SGOT) 21, Alanine Aminotransferase (ALT/SGPT) 129H, Alkaline 

Phosphatase 78, Total Protein 5.0L, Albumin 2.4L, Globulin 2.6, Albumin/

Globulin Ratio 0.9L


Height (Feet):  6


Height (Inches):  3.00


Weight (Pounds):  238


General Appearance:  no apparent distress


Objective


no change











Lawson Vergara MD Feb 1, 2020 12:50

## 2020-02-03 NOTE — DISCHARGE SUMMARY
Discharge Summary


Discharge Summary


_


DATE OF ADMISSION: 1/21/2020





DATE OF DISCHARGE: 2/01/2020








DISCHARGED BY: Dr. Duffy





REASON FOR ADMISSION: 


49 years old male with past medical history of congestive heart failure with 

diastolic dysfunction, hypertension, hyperlipidemia, diabetes mellitus, 

schizophrenia, presented by paramedics due to episode of hypoglycemia.  


Patient also reported cough and  found to be hypoxic.  


Chest x-ray revealed mild pulmonary vascular congestion.  


Patient started on IV fluids with dextrose.  


Patient received bronchodilator treatment via hand-held nebulizer and loading 

dose of Solu-Medrol.  


Laboratory work-up revealed elevated troponin -1.058;   pro BNP 1839.  


EKG revealed sinus rhythm ,  left atrial enlargement, nonspecific ST-T wave 

changes,   prolonged . 


Albumin 2.7. 


Urine toxicology screen was negative .


Patient received aspirin and Nitro-paste.  


Sodium  139.  


BUN 39, creatinine 1.0.  


Calcium 7.4.  


Urine toxicology screen was positive for marijuana .  


, ALT 1182.  


Patient subsequently admitted to direct observational unit for further 

management 





CONSULTANTS:


cardiologist   


GI specialist Dr. Reeves


nephrologist Dr. Vergara


endocrinologist Dr. Joiner


psychiatrist 


 


[facility] COURSE: 


Patient admitted to monitored floor.  


Patient started on IV fluids with dextrose.  


Serial troponin followed.  


Patient started on antiplatelet therapy.  


Echocardiogram revealed preserved ejection fraction of 50% with mild left 

ventricular hypertrophy.  Diastolic septal flattening suggesting RV volume 

overload.  


Right ventricular systolic pressure of 49 consistent with a moderate pulmonary 

hypertension .  


Venous duplex bilateral lower extremity revealed no evidence of acute DVT.  


CT angiogram of the chest revealed no evidence of pulmonary emboli, aortic 

dissection or aneurysm.  Basal atelectasis noted.  


Findings were suggestive of COPD.  Cholelithiasis noted.  Cardiomegaly.


DVT prophylaxis provided.  


Beta-blocker  initially were hold due to COPD. 


Patient started  on empiric antibiotic and IV steroids.


Antitussive provided as needed.








Respiratory status worsened, requiring placement on BiPAP. 


Patient was followed-up with  ABGs.  


Fraction of inspired oxygen titrated to keep pulse oximetry above 90%.  

Bronchodilator treatment provided around-the-clock and as needed.


ABG on 1/ 23 on BiPAP 20/5 with the FiO2 of 50% showed pH 7.2 and PCO2 123.   


Patient subsequently was intubated by emergency room and was  transferred  to 

ICU.  


Chest x-ray confirmed  satisfactory endotracheal intubation.


Ventilator support and pulmonary toilet provided.


Beta-blockers were avoided given severe obstructive lung disease.  


Patient was on steroids with gradual tapering. 


IV steroids were tapered and changed to oral route. 


Patient developed atrial flutter with one-to-one conduction, ventricular rate 

250 in setting of respiratory failure .  


Atrial flutter resolved with IV digoxin and metoprolol.  


Patient converted to sinus rhythm.   


CHADS score of 1, thus  he  was  not placed on oral anticoagulation therapy.   


EKG showed deep T wave inversion inferiorly and anteriorly , consistent with 

NSTEMI


Spot diuresis provided.  


Patient eventually was able to be extubated.


Supplemental oxygen provided to keep pulse oximetry above 92%.


Prior to discharge pulse oximetry stable on 4 L of oxygen via nasal cannula.





GI prophylaxis provided.    


Blood sugar was managed as per endocrinologist.


Dextrose drip discontinued after blood  sugar stabilized.  


Hemoglobin A1c 7.5.  


Blood sugar was managed with long-acting Levemir along with  Starlix pre-meal.  


Sliding scale of insulin was on board as needed.  


Diabetic diet provided.


Electrolytes corrected as needed . 


Renal parameters were closely monitored , prior to discharge BUN from 39 down 

to 15 and  creatinine remained stable. 





Abdominal ultrasound revealed cholelithiasis.  Hepatosplenomegaly.  Trace 

ascites.  Some degree of portal hypertension was not excluded.


LFT trended.  


AST from 377 down to 21  . ALT remained stable. 





Per psychiatrist , patient had acute encephalopathy,  which improved and  

schizophrenia.  


Psychiatric medication regimen was optimized as per psychiatrist.





Placement was found at  the skilled nursing facility.  Patient was stable for 

transfer.





FINAL DIAGNOSES: 


Acute hypercapnic hypoxemic respiratory failure requiring intubation , status 

post extubation


Paroxysmal atrial fibrillation/flutter-resolved 


Acute on chronic respiratory acidosis


NSTEMI likely due to right heart failure


Severe pulmonary hypertension


COPD exacerbation


Acute metabolic encephalopathy


Hypoglycemia 


Acute on chronic  systolic and diastolic congestive heart failure


Transaminitis


Cirrhosis with ascites


Electrolyte abnormalities


Diabetes mellitus


Hyperlipidemia


Schizophrenia





 





DISCHARGE MEDICATIONS:


See Medication Reconciliation list.





DISCHARGE INSTRUCTIONS:


Patient was discharged to the skilled nursing facility. 


Follow up with medical doctor at the facility.





I have been assigned to dictate discharge summary for this account. 


I was not involved in the patient's management.











Monica Anthony NP Feb 3, 2020 07:39

## 2020-02-03 NOTE — NUR
*-* INSURANCE *-*



UPDATED CLINICALS AND DISCHARGE SUMMARY HAS BEEN FAXED TO:



[facility]

[place]:[facility]

P: [phone]

F: [phone]

REF# [id number]

## 2020-08-05 ENCOUNTER — HOSPITAL ENCOUNTER (INPATIENT)
Dept: HOSPITAL 87 - ER | Age: 50
LOS: 5 days | Discharge: NURSING, REHAB OR LONG-TERM CARE | DRG: 133 | End: 2020-08-10
Attending: INTERNAL MEDICINE | Admitting: INTERNAL MEDICINE
Payer: MEDICAID

## 2020-08-05 VITALS — SYSTOLIC BLOOD PRESSURE: 131 MMHG | DIASTOLIC BLOOD PRESSURE: 90 MMHG

## 2020-08-05 VITALS — BODY MASS INDEX: 27.28 KG/M2 | WEIGHT: 180 LBS | HEIGHT: 68 IN

## 2020-08-05 DIAGNOSIS — R62.50: ICD-10-CM

## 2020-08-05 DIAGNOSIS — F20.9: ICD-10-CM

## 2020-08-05 DIAGNOSIS — Z03.818: ICD-10-CM

## 2020-08-05 DIAGNOSIS — J44.1: ICD-10-CM

## 2020-08-05 DIAGNOSIS — E87.2: ICD-10-CM

## 2020-08-05 DIAGNOSIS — I25.10: ICD-10-CM

## 2020-08-05 DIAGNOSIS — D69.6: ICD-10-CM

## 2020-08-05 DIAGNOSIS — I48.0: ICD-10-CM

## 2020-08-05 DIAGNOSIS — I27.21: ICD-10-CM

## 2020-08-05 DIAGNOSIS — Z99.81: ICD-10-CM

## 2020-08-05 DIAGNOSIS — J96.01: Primary | ICD-10-CM

## 2020-08-05 DIAGNOSIS — F17.210: ICD-10-CM

## 2020-08-05 DIAGNOSIS — I11.0: ICD-10-CM

## 2020-08-05 DIAGNOSIS — E87.5: ICD-10-CM

## 2020-08-05 DIAGNOSIS — I50.33: ICD-10-CM

## 2020-08-05 DIAGNOSIS — E11.9: ICD-10-CM

## 2020-08-05 LAB
APPEARANCE UR: CLEAR
APTT PPP: 26.4 SEC (ref 23.4–31)
BASE EXCESS BLDA CALC-SCNC: 10.8 MMOL/L (ref -2–2)
BASOPHILS NFR BLD AUTO: 0.7 % (ref 0–2)
CHLORIDE SERPL-SCNC: 92 MEQ/L (ref 98–107)
COHGB MFR BLDA: 6.3 % (ref 0.5–1.5)
COLOR UR: YELLOW
DO-HGB MFR BLDA: 26.5 % (ref 0–5)
EOSINOPHIL NFR BLD AUTO: 0.5 % (ref 0–5)
ERYTHROCYTE [DISTWIDTH] IN BLOOD BY AUTOMATED COUNT: 15.2 % (ref 11.6–14.6)
HCO3 BLDA-SCNC: 45.1 MMOL/L (ref 22–26)
HCT VFR BLD AUTO: 47.5 % (ref 42–52)
HGB BLD-MCNC: 15.3 G/DL (ref 14–18)
HGB BLDA OXIMETRY-MCNC: 17 G/DL (ref 12–18)
HGB UR QL STRIP: NEGATIVE
INHALED O2 CONCENTRATION: 21 %
INR PPP: 1.2
KETONES UR STRIP-MCNC: NEGATIVE MG/DL
LEUKOCYTE ESTERASE UR QL STRIP: NEGATIVE
LYMPHOCYTES NFR BLD AUTO: 16.9 % (ref 20–50)
MCH RBC QN AUTO: 29.8 PG (ref 28–32)
MCV RBC AUTO: 92.7 FL (ref 80–94)
METHGB MFR BLDA: 0.3 % (ref 0–1.5)
MONOCYTES NFR BLD AUTO: 14.4 % (ref 2–8)
NEUTROPHILS NFR BLD AUTO: 67.5 % (ref 40–76)
NITRITE UR QL STRIP: NEGATIVE
OXYHGB MFR BLDA: 66.9 % (ref 94–97)
PCO2 TEMP ADJ BLDA: 115.3 MMHG (ref 35–45)
PH TEMP ADJ BLDA: 7.21 [PH] (ref 7.35–7.45)
PH UR STRIP: 6 [PH] (ref 4.5–8)
PLATELET # BLD AUTO: 124 X1000/UL (ref 130–400)
PMV BLD AUTO: 8.4 FL (ref 7.4–10.4)
PO2 TEMP ADJ BLDA: 40.2 MMHG (ref 75–100)
PROT UR QL STRIP: (no result)
PROTHROMBIN TIME: 12.4 SEC (ref 9.6–11)
RBC # BLD AUTO: 5.13 MILL/UL (ref 4.7–6.1)
SAO2 % BLDA: 71.6 % (ref 92–98.5)
SP GR UR STRIP: 1.02 (ref 1–1.03)
SPECIMEN DRAWN FROM PATIENT: (no result)
UROBILINOGEN UR STRIP-MCNC: 1 E.U./DL (ref 0.2–1)
VENTILATION MODE VENT: (no result)

## 2020-08-05 PROCEDURE — 80048 BASIC METABOLIC PNL TOTAL CA: CPT

## 2020-08-05 PROCEDURE — 83605 ASSAY OF LACTIC ACID: CPT

## 2020-08-05 PROCEDURE — 93306 TTE W/DOPPLER COMPLETE: CPT

## 2020-08-05 PROCEDURE — 85025 COMPLETE CBC W/AUTO DIFF WBC: CPT

## 2020-08-05 PROCEDURE — 84145 PROCALCITONIN (PCT): CPT

## 2020-08-05 PROCEDURE — 82805 BLOOD GASES W/O2 SATURATION: CPT

## 2020-08-05 PROCEDURE — 80053 COMPREHEN METABOLIC PANEL: CPT

## 2020-08-05 PROCEDURE — 82375 ASSAY CARBOXYHB QUANT: CPT

## 2020-08-05 PROCEDURE — 83036 HEMOGLOBIN GLYCOSYLATED A1C: CPT

## 2020-08-05 PROCEDURE — 83735 ASSAY OF MAGNESIUM: CPT

## 2020-08-05 PROCEDURE — 71275 CT ANGIOGRAPHY CHEST: CPT

## 2020-08-05 PROCEDURE — 80061 LIPID PANEL: CPT

## 2020-08-05 PROCEDURE — 84484 ASSAY OF TROPONIN QUANT: CPT

## 2020-08-05 PROCEDURE — 93005 ELECTROCARDIOGRAM TRACING: CPT

## 2020-08-05 PROCEDURE — 83880 ASSAY OF NATRIURETIC PEPTIDE: CPT

## 2020-08-05 PROCEDURE — 36600 WITHDRAWAL OF ARTERIAL BLOOD: CPT

## 2020-08-05 PROCEDURE — 36415 COLL VENOUS BLD VENIPUNCTURE: CPT

## 2020-08-05 PROCEDURE — 80305 DRUG TEST PRSMV DIR OPT OBS: CPT

## 2020-08-05 PROCEDURE — 94640 AIRWAY INHALATION TREATMENT: CPT

## 2020-08-05 PROCEDURE — 71045 X-RAY EXAM CHEST 1 VIEW: CPT

## 2020-08-05 PROCEDURE — 81003 URINALYSIS AUTO W/O SCOPE: CPT

## 2020-08-05 PROCEDURE — 99285 EMERGENCY DEPT VISIT HI MDM: CPT

## 2020-08-05 RX ADMIN — FUROSEMIDE SCH MG: 10 INJECTION, SOLUTION INTRAMUSCULAR; INTRAVENOUS at 18:43

## 2020-08-05 RX ADMIN — DEXAMETHASONE SCH MG: 4 TABLET ORAL at 20:42

## 2020-08-05 RX ADMIN — ENOXAPARIN SODIUM SCH MG: 100 INJECTION SUBCUTANEOUS at 20:42

## 2020-08-06 VITALS — SYSTOLIC BLOOD PRESSURE: 111 MMHG | DIASTOLIC BLOOD PRESSURE: 70 MMHG

## 2020-08-06 VITALS — DIASTOLIC BLOOD PRESSURE: 56 MMHG | SYSTOLIC BLOOD PRESSURE: 100 MMHG

## 2020-08-06 VITALS — SYSTOLIC BLOOD PRESSURE: 119 MMHG | DIASTOLIC BLOOD PRESSURE: 53 MMHG

## 2020-08-06 VITALS — SYSTOLIC BLOOD PRESSURE: 103 MMHG | DIASTOLIC BLOOD PRESSURE: 55 MMHG

## 2020-08-06 VITALS — SYSTOLIC BLOOD PRESSURE: 133 MMHG | DIASTOLIC BLOOD PRESSURE: 83 MMHG

## 2020-08-06 VITALS — SYSTOLIC BLOOD PRESSURE: 131 MMHG | DIASTOLIC BLOOD PRESSURE: 90 MMHG

## 2020-08-06 LAB
AMPHETAMINES UR QL SCN: NEGATIVE
BARBITURATES UR QL SCN: NEGATIVE
BASOPHILS NFR BLD AUTO: 0.1 % (ref 0–2)
BENZODIAZ UR QL SCN: NEGATIVE
BZE UR QL SCN: NEGATIVE
CANNABINOIDS UR QL SCN: NEGATIVE
CHLORIDE SERPL-SCNC: 87 MEQ/L (ref 98–107)
EOSINOPHIL NFR BLD AUTO: 0 % (ref 0–5)
ERYTHROCYTE [DISTWIDTH] IN BLOOD BY AUTOMATED COUNT: 15.5 % (ref 11.6–14.6)
HCT VFR BLD AUTO: 48.5 % (ref 42–52)
HDLC SERPL-MCNC: 38 MG/DL (ref 40–59)
HDLC SERPL-MCNC: 40 MG/DL (ref 40–59)
HGB BLD-MCNC: 15.4 G/DL (ref 14–18)
LDLC SERPL DIRECT ASSAY-MCNC: 77 MG/DL (ref 5–100)
LDLC SERPL DIRECT ASSAY-MCNC: 80 MG/DL (ref 5–100)
LYMPHOCYTES NFR BLD AUTO: 7.6 % (ref 20–50)
MCH RBC QN AUTO: 29.5 PG (ref 28–32)
MCV RBC AUTO: 93.1 FL (ref 80–94)
METHADONE UR QL SCN: NEGATIVE
MONOCYTES NFR BLD AUTO: 3.4 % (ref 2–8)
NEUTROPHILS NFR BLD AUTO: 88.9 % (ref 40–76)
OPIATES UR QL SCN: NEGATIVE
PCP UR QL SCN: NEGATIVE
PLATELET # BLD AUTO: 113 X1000/UL (ref 130–400)
PMV BLD AUTO: 8.6 FL (ref 7.4–10.4)
RBC # BLD AUTO: 5.21 MILL/UL (ref 4.7–6.1)

## 2020-08-06 RX ADMIN — LORAZEPAM PRN MG: 0.5 TABLET ORAL at 15:49

## 2020-08-06 RX ADMIN — LORAZEPAM PRN MG: 0.5 TABLET ORAL at 20:52

## 2020-08-06 RX ADMIN — ENOXAPARIN SODIUM SCH MG: 100 INJECTION SUBCUTANEOUS at 20:54

## 2020-08-06 RX ADMIN — FUROSEMIDE SCH MG: 10 INJECTION, SOLUTION INTRAMUSCULAR; INTRAVENOUS at 09:15

## 2020-08-06 RX ADMIN — ASPIRIN SCH MG: 81 TABLET, CHEWABLE ORAL at 15:39

## 2020-08-06 RX ADMIN — ATORVASTATIN CALCIUM SCH MG: 20 TABLET, FILM COATED ORAL at 20:52

## 2020-08-06 RX ADMIN — DEXAMETHASONE SCH MG: 4 TABLET ORAL at 09:15

## 2020-08-06 RX ADMIN — FUROSEMIDE SCH MG: 40 TABLET ORAL at 20:52

## 2020-08-07 VITALS — DIASTOLIC BLOOD PRESSURE: 66 MMHG | SYSTOLIC BLOOD PRESSURE: 123 MMHG

## 2020-08-07 VITALS — SYSTOLIC BLOOD PRESSURE: 136 MMHG | DIASTOLIC BLOOD PRESSURE: 89 MMHG

## 2020-08-07 VITALS — SYSTOLIC BLOOD PRESSURE: 117 MMHG | DIASTOLIC BLOOD PRESSURE: 70 MMHG

## 2020-08-07 VITALS — SYSTOLIC BLOOD PRESSURE: 128 MMHG | DIASTOLIC BLOOD PRESSURE: 60 MMHG

## 2020-08-07 VITALS — DIASTOLIC BLOOD PRESSURE: 88 MMHG | SYSTOLIC BLOOD PRESSURE: 139 MMHG

## 2020-08-07 VITALS — DIASTOLIC BLOOD PRESSURE: 70 MMHG | SYSTOLIC BLOOD PRESSURE: 117 MMHG

## 2020-08-07 VITALS — SYSTOLIC BLOOD PRESSURE: 106 MMHG | DIASTOLIC BLOOD PRESSURE: 76 MMHG

## 2020-08-07 LAB
BASOPHILS NFR BLD AUTO: 0.3 % (ref 0–2)
CHLORIDE SERPL-SCNC: 79 MEQ/L (ref 98–107)
EOSINOPHIL NFR BLD AUTO: 0.1 % (ref 0–5)
ERYTHROCYTE [DISTWIDTH] IN BLOOD BY AUTOMATED COUNT: 15.2 % (ref 11.6–14.6)
HCT VFR BLD AUTO: 48.3 % (ref 42–52)
HGB BLD-MCNC: 15.4 G/DL (ref 14–18)
LYMPHOCYTES NFR BLD AUTO: 10.6 % (ref 20–50)
MCH RBC QN AUTO: 29.4 PG (ref 28–32)
MCV RBC AUTO: 92.4 FL (ref 80–94)
MONOCYTES NFR BLD AUTO: 11.8 % (ref 2–8)
NEUTROPHILS NFR BLD AUTO: 77.2 % (ref 40–76)
PLATELET # BLD AUTO: 131 X1000/UL (ref 130–400)
PMV BLD AUTO: 8.7 FL (ref 7.4–10.4)
RBC # BLD AUTO: 5.23 MILL/UL (ref 4.7–6.1)

## 2020-08-07 RX ADMIN — FUROSEMIDE SCH MG: 40 TABLET ORAL at 09:00

## 2020-08-07 RX ADMIN — ATORVASTATIN CALCIUM SCH MG: 20 TABLET, FILM COATED ORAL at 21:57

## 2020-08-07 RX ADMIN — ENOXAPARIN SODIUM SCH MG: 100 INJECTION SUBCUTANEOUS at 21:58

## 2020-08-07 RX ADMIN — PREDNISONE SCH MG: 20 TABLET ORAL at 08:42

## 2020-08-07 RX ADMIN — ACETAMINOPHEN PRN MG: 325 TABLET, FILM COATED ORAL at 17:25

## 2020-08-07 RX ADMIN — ASPIRIN SCH MG: 81 TABLET, CHEWABLE ORAL at 08:42

## 2020-08-07 RX ADMIN — NATEGLINIDE SCH MG: 60 TABLET ORAL at 17:21

## 2020-08-07 RX ADMIN — RISPERIDONE SCH MG: 1 TABLET ORAL at 21:57

## 2020-08-07 RX ADMIN — Medication SCH MG: at 16:26

## 2020-08-07 RX ADMIN — FUROSEMIDE SCH MG: 40 TABLET ORAL at 16:26

## 2020-08-07 RX ADMIN — FUROSEMIDE SCH MG: 40 TABLET ORAL at 12:48

## 2020-08-07 RX ADMIN — TRAZODONE HYDROCHLORIDE SCH MG: 50 TABLET ORAL at 22:00

## 2020-08-08 VITALS — DIASTOLIC BLOOD PRESSURE: 76 MMHG | SYSTOLIC BLOOD PRESSURE: 116 MMHG

## 2020-08-08 VITALS — DIASTOLIC BLOOD PRESSURE: 78 MMHG | SYSTOLIC BLOOD PRESSURE: 120 MMHG

## 2020-08-08 VITALS — SYSTOLIC BLOOD PRESSURE: 138 MMHG | DIASTOLIC BLOOD PRESSURE: 93 MMHG

## 2020-08-08 VITALS — DIASTOLIC BLOOD PRESSURE: 75 MMHG | SYSTOLIC BLOOD PRESSURE: 120 MMHG

## 2020-08-08 VITALS — SYSTOLIC BLOOD PRESSURE: 116 MMHG | DIASTOLIC BLOOD PRESSURE: 69 MMHG

## 2020-08-08 VITALS — DIASTOLIC BLOOD PRESSURE: 88 MMHG | SYSTOLIC BLOOD PRESSURE: 141 MMHG

## 2020-08-08 LAB
BASOPHILS NFR BLD AUTO: 0.3 % (ref 0–2)
CHLORIDE SERPL-SCNC: 75 MEQ/L (ref 98–107)
EOSINOPHIL NFR BLD AUTO: 0.1 % (ref 0–5)
ERYTHROCYTE [DISTWIDTH] IN BLOOD BY AUTOMATED COUNT: 15.4 % (ref 11.6–14.6)
HCT VFR BLD AUTO: 49.3 % (ref 42–52)
HGB BLD-MCNC: 15.9 G/DL (ref 14–18)
LYMPHOCYTES NFR BLD AUTO: 15.2 % (ref 20–50)
MCH RBC QN AUTO: 29.2 PG (ref 28–32)
MCV RBC AUTO: 90.4 FL (ref 80–94)
MONOCYTES NFR BLD AUTO: 11.6 % (ref 2–8)
NEUTROPHILS NFR BLD AUTO: 72.8 % (ref 40–76)
PLATELET # BLD AUTO: 120 X1000/UL (ref 130–400)
PMV BLD AUTO: 8.5 FL (ref 7.4–10.4)
RBC # BLD AUTO: 5.45 MILL/UL (ref 4.7–6.1)

## 2020-08-08 RX ADMIN — PANTOPRAZOLE SODIUM SCH MG: 40 TABLET, DELAYED RELEASE ORAL at 06:16

## 2020-08-08 RX ADMIN — ENOXAPARIN SODIUM SCH MG: 100 INJECTION SUBCUTANEOUS at 21:11

## 2020-08-08 RX ADMIN — NATEGLINIDE SCH MG: 60 TABLET ORAL at 15:11

## 2020-08-08 RX ADMIN — Medication SCH MG: at 09:59

## 2020-08-08 RX ADMIN — FUROSEMIDE SCH MG: 40 TABLET ORAL at 15:11

## 2020-08-08 RX ADMIN — RISPERIDONE SCH MG: 1 TABLET ORAL at 21:12

## 2020-08-08 RX ADMIN — ACETAZOLAMIDE SCH MG: 250 TABLET ORAL at 10:34

## 2020-08-08 RX ADMIN — ATORVASTATIN CALCIUM SCH MG: 20 TABLET, FILM COATED ORAL at 21:12

## 2020-08-08 RX ADMIN — PREDNISONE SCH MG: 20 TABLET ORAL at 09:59

## 2020-08-08 RX ADMIN — NATEGLINIDE SCH MG: 60 TABLET ORAL at 09:59

## 2020-08-08 RX ADMIN — TRAZODONE HYDROCHLORIDE SCH MG: 50 TABLET ORAL at 21:11

## 2020-08-08 RX ADMIN — FUROSEMIDE SCH MG: 40 TABLET ORAL at 10:00

## 2020-08-08 RX ADMIN — ASPIRIN SCH MG: 81 TABLET, CHEWABLE ORAL at 09:59

## 2020-08-08 RX ADMIN — LISINOPRIL SCH MG: 10 TABLET ORAL at 09:59

## 2020-08-09 VITALS — DIASTOLIC BLOOD PRESSURE: 84 MMHG | SYSTOLIC BLOOD PRESSURE: 119 MMHG

## 2020-08-09 VITALS — DIASTOLIC BLOOD PRESSURE: 73 MMHG | SYSTOLIC BLOOD PRESSURE: 116 MMHG

## 2020-08-09 VITALS — DIASTOLIC BLOOD PRESSURE: 83 MMHG | SYSTOLIC BLOOD PRESSURE: 122 MMHG

## 2020-08-09 VITALS — DIASTOLIC BLOOD PRESSURE: 90 MMHG | SYSTOLIC BLOOD PRESSURE: 130 MMHG

## 2020-08-09 RX ADMIN — FUROSEMIDE SCH MG: 40 TABLET ORAL at 08:40

## 2020-08-09 RX ADMIN — ENOXAPARIN SODIUM SCH MG: 100 INJECTION SUBCUTANEOUS at 21:36

## 2020-08-09 RX ADMIN — NATEGLINIDE SCH MG: 60 TABLET ORAL at 12:22

## 2020-08-09 RX ADMIN — FUROSEMIDE SCH MG: 40 TABLET ORAL at 17:31

## 2020-08-09 RX ADMIN — FUROSEMIDE SCH MG: 40 TABLET ORAL at 12:22

## 2020-08-09 RX ADMIN — FUROSEMIDE SCH MG: 40 TABLET ORAL at 08:59

## 2020-08-09 RX ADMIN — LISINOPRIL SCH MG: 10 TABLET ORAL at 08:40

## 2020-08-09 RX ADMIN — ACETAZOLAMIDE SCH MG: 250 TABLET ORAL at 08:36

## 2020-08-09 RX ADMIN — ACETAMINOPHEN PRN MG: 325 TABLET, FILM COATED ORAL at 09:31

## 2020-08-09 RX ADMIN — PANTOPRAZOLE SODIUM SCH MG: 40 TABLET, DELAYED RELEASE ORAL at 05:38

## 2020-08-09 RX ADMIN — NATEGLINIDE SCH MG: 60 TABLET ORAL at 08:39

## 2020-08-09 RX ADMIN — PREDNISONE SCH MG: 20 TABLET ORAL at 08:35

## 2020-08-09 RX ADMIN — NATEGLINIDE SCH MG: 60 TABLET ORAL at 17:32

## 2020-08-09 RX ADMIN — TRAZODONE HYDROCHLORIDE SCH MG: 50 TABLET ORAL at 21:36

## 2020-08-09 RX ADMIN — NATEGLINIDE SCH MG: 60 TABLET ORAL at 09:00

## 2020-08-09 RX ADMIN — Medication SCH MG: at 08:36

## 2020-08-09 RX ADMIN — ASPIRIN SCH MG: 81 TABLET, CHEWABLE ORAL at 08:34

## 2020-08-09 RX ADMIN — ATORVASTATIN CALCIUM SCH MG: 20 TABLET, FILM COATED ORAL at 21:36

## 2020-08-09 RX ADMIN — RISPERIDONE SCH MG: 1 TABLET ORAL at 21:36

## 2020-08-10 VITALS — DIASTOLIC BLOOD PRESSURE: 87 MMHG | SYSTOLIC BLOOD PRESSURE: 141 MMHG

## 2020-08-10 VITALS — SYSTOLIC BLOOD PRESSURE: 146 MMHG | DIASTOLIC BLOOD PRESSURE: 50 MMHG

## 2020-08-10 VITALS — DIASTOLIC BLOOD PRESSURE: 78 MMHG | SYSTOLIC BLOOD PRESSURE: 121 MMHG

## 2020-08-10 VITALS — SYSTOLIC BLOOD PRESSURE: 18 MMHG | DIASTOLIC BLOOD PRESSURE: 146 MMHG

## 2020-08-10 RX ADMIN — FUROSEMIDE SCH MG: 40 TABLET ORAL at 09:25

## 2020-08-10 RX ADMIN — PANTOPRAZOLE SODIUM SCH MG: 40 TABLET, DELAYED RELEASE ORAL at 06:05

## 2020-08-10 RX ADMIN — ASPIRIN SCH MG: 81 TABLET, CHEWABLE ORAL at 09:25

## 2020-08-10 RX ADMIN — Medication SCH MG: at 09:25

## 2020-08-10 RX ADMIN — ACETAZOLAMIDE SCH MG: 250 TABLET ORAL at 09:25

## 2020-08-10 RX ADMIN — NATEGLINIDE SCH MG: 60 TABLET ORAL at 09:25

## 2020-10-05 NOTE — NUR
****DISCHARGE PLANNING

PATIENT IS FROM [facility]

OXYGEN WAS ORDERED AND DELIVERED TO BEDSIDE

PER [facility], THE  AT [facility] THEY ARE NOT LICENSED TO HAVE OXYGEN AT THEIR FACILITY

PATIENT IS ALSO UNDER THE CARE OF [facility] ~ [place] IS HIS  T: 
[phone]



PATIENT IS ALSO UNDER THE CARE OF [facility]

MESSAGE LEFT FOR [facility] , [facility] REQUESTING NEW PLACEMENT FOR THIS INDIVIDUAL 
SINCE HE IS NOW REQUIRING CONTINUOUS OXYGEN



[facility]

T: [phone] ([facility])



[facility]

 ~ [place]

T: [phone]



***WAITING FOR [facility] TO FIND NEW PLACEMENT FOR THIS INDIVIDUAL no

## 2021-04-10 NOTE — CONSULTATION
Consult Note


Consult Note





asked to eval at the request of dr Duffy for high K





This is a 49-year-old male who is a very poor


historian.  He came to the emergency room with  known history of


hypoglycemia.  He was given D10 and woke up and subsequently also required


additional D10 infusions.  The patient has been on glyburide at home.  He


is unable to provide a fair history.  He has a history of psych disorder.


Overnight, the patient has been found to have elevated troponin.  He has a


history of schizophrenia and COPD.  He is unable to provide a clear


history.





PAST MEDICAL HISTORY:  Notable for CHF, hypertension, diabetes mellitus,


hyperlipidemia, schizophrenia, chronic lower extremity edema.





seen in [facility]


discussed with RN


patient combative 


on BIPAP





.


Assessment/Plan





HyperKalemia- no clear cut etiology . ? acidosis


Acute respiratory failure


periodic hypoglycemia


Metabolic encephalopathy.


Chronic obstructive pulmonary disease with exacerbation. Now on BiPAP


Acute myocardial infarction.


Hyponatremia.


Hypomagnesemia.


Transaminitis.


Acute on chronic congestive heart failure, possibly systolic and diastolic. j 

Fx 50%








Kayexelate


haldol


Monitor labs


Nitro and asa











Lawson Vergara MD Jan 23, 2020 10:38
DATE OF CONSULTATION:  01/21/2020

CARDIOLOGY CONSULTATION



CONSULTING PHYSICIAN:  Franklin Murphy M.D.



REQUESTING PHYSICIAN:  True Duffy M.D.



REASON FOR CONSULTATION:  Elevated troponin level.



HISTORY OF PRESENT ILLNESS:  This is a 49-year-old male with a psychiatric

disorder who resides at an assisted living facility.  He apparently was

diaphoretic and lost consciousness and was noted to have a blood glucose

of 25 when paramedics arrived.  The patient was given D10 and awaken.  He

was brought to the emergency room.  Abnormal laboratory studies including

an elevated troponin were noted.  I have been asked to assist with

cardiovascular care.  The patient is unaware of any chest discomfort and

denies shortness of breath.  He has had some cough, congestion.



PAST MEDICAL HISTORY:  Includes chronic obstructive pulmonary disease, type

2 diabetes mellitus, hypertension, and schizoaffective disorder.



ALLERGIES:  None.



MEDICATIONS:  Reviewed.



SOCIAL HISTORY:  Positive for smoking.  Denies alcohol or substance

abuse.



FAMILY HISTORY:  Noncontributory.



REVIEW OF SYSTEMS:  Otherwise unremarkable.



PHYSICAL EXAMINATION:

VITAL SIGNS:  Blood pressure 130/68, pulse 67, respirations 18.

HEENT:  Conjunctivae are pink.  Oropharynx clear.

NECK:  Supple.  Jugular venous pressure normal.

LUNGS:  Coarse breath sounds.  No wheezing.

CARDIAC:  Regular rhythm and rate.  Normal S1 and S2 with no

murmur.

ABDOMEN:  Soft.

EXTREMITIES:  No edema.



DIAGNOSTIC DATA:  EKG with sinus rhythm, left atrial enlargement,

nonspecific ST-T wave changes.  QT prolongation is slight.  Chest x-ray

with no acute process.  Sodium 131, potassium 4.2, bicarbonate 35,

chloride 92, BUN 39, creatinine 1, glucose 36, and lactic acid 2.2.

Magnesium 1.6.  Troponin 1.058.  Pro-natriuretic peptide 1839.  Albumin

2.7.  Urine toxicology screen positive for marijuana.  White count 8.8 and

hemoglobin 16.4.  Urinalysis, no active sediment.



IMPRESSION:

1. Hypoglycemia.

2. Metabolic encephalopathy.

3. Chronic obstructive pulmonary disease with exacerbation.

4. Acute myocardial infarction.

5. Hyponatremia.

6. Hypochloremia.

7. Hypomagnesemia.

8. Transaminitis.

9. Acute on chronic congestive heart failure, possibly systolic and

diastolic.



PLAN:

1. Dextrose drip with saline.

2. Serial troponin.

3. Anti-platelet therapy.

4. Hold beta-blocker.

5. Hold diuresis.

6. DVT prophylaxis.

7. Empiric antimicrobials.

8. Echocardiogram.

9. Metabolic profile.

10. Abdominal ultrasound.









  ______________________________________________

  Franklin Murphy M.D.





DR:  DARIEN

D:  01/22/2020 02:19

T:  01/22/2020 03:47

JOB#:  [id number]

CC:
DATE OF CONSULTATION:  01/23/2020

HISTORY OF PRESENT ILLNESS:  The patient is a 49-year-old male with history

of multiple medical issues including schizoaffective disorder,

hypertension, CHF, diabetes mellitus, and hyperlipidemia who has been

admitted to the hospital.  The patient is severely agitated and

aggressive.  He ____ nurse this morning.  The patient is in bilateral self

restraints.  The patient was agitated, however, his _____ was compromised.

ABG was not optimal.  Therefore, I ordered Haldol IM only.  I advised to

avoid benzodiazepine as it may suppress the respirations more.  The

patient is on multiple psychotropic medications.



PAST PSYCHIATRIC HISTORY:  The patient is a poor historian.  He has history

of schizophrenia.  He is on Risperdal, Seroquel, as well as trazodone

_____. The patient also was on Haldol.



PAST MEDICAL HISTORY:  As above.



ALLERGIES:  No known drug allergies.



SUBSTANCE ABUSE HISTORY:  No known history of illicit drug use or alcohol.

Toxicology is positive for marijuana.



MENTAL STATUS EXAMINATION:  The patient is alert.  He knew that he was in

the hospital.  He knew his name, knew the date and year, however, was

unable to follow directions due to cognitive impairment.  Mood was

agitated.  Affect is flat.  Thought process is disorganized.  Thought

content, no suicidal or homicidal ideation.



ASSESSMENT:

Axis I  Acute encephalopathy.

Schizophrenia.

Axis II  Deferred.

Axis III  As above.

Axis IV  Low.

Axis V  20.



PLAN:

1. _____ continue the risperidone.

2. Haldol only.

3. _____ IM was started by Dr. Duffy _____.









  ______________________________________________

  Leland Harris M.D.





DR:  Philip

D:  01/23/2020 23:54

T:  01/24/2020 06:09

JOB#:  [id number]

CC:
Bebe